# Patient Record
Sex: MALE | Race: WHITE | NOT HISPANIC OR LATINO | Employment: FULL TIME | URBAN - METROPOLITAN AREA
[De-identification: names, ages, dates, MRNs, and addresses within clinical notes are randomized per-mention and may not be internally consistent; named-entity substitution may affect disease eponyms.]

---

## 2023-07-26 ENCOUNTER — OFFICE VISIT (OUTPATIENT)
Dept: FAMILY MEDICINE CLINIC | Facility: CLINIC | Age: 53
End: 2023-07-26
Payer: COMMERCIAL

## 2023-07-26 VITALS
SYSTOLIC BLOOD PRESSURE: 134 MMHG | OXYGEN SATURATION: 96 % | HEIGHT: 71 IN | HEART RATE: 70 BPM | WEIGHT: 203.6 LBS | DIASTOLIC BLOOD PRESSURE: 96 MMHG | BODY MASS INDEX: 28.5 KG/M2 | RESPIRATION RATE: 18 BRPM

## 2023-07-26 DIAGNOSIS — Z12.11 SCREENING FOR COLON CANCER: ICD-10-CM

## 2023-07-26 DIAGNOSIS — F33.42 RECURRENT MAJOR DEPRESSIVE DISORDER, IN FULL REMISSION: Primary | ICD-10-CM

## 2023-07-26 DIAGNOSIS — F11.21: ICD-10-CM

## 2023-07-26 PROCEDURE — 1160F RVW MEDS BY RX/DR IN RCRD: CPT | Performed by: STUDENT IN AN ORGANIZED HEALTH CARE EDUCATION/TRAINING PROGRAM

## 2023-07-26 PROCEDURE — 99203 OFFICE O/P NEW LOW 30 MIN: CPT | Performed by: STUDENT IN AN ORGANIZED HEALTH CARE EDUCATION/TRAINING PROGRAM

## 2023-07-26 PROCEDURE — 1159F MED LIST DOCD IN RCRD: CPT | Performed by: STUDENT IN AN ORGANIZED HEALTH CARE EDUCATION/TRAINING PROGRAM

## 2023-07-26 RX ORDER — DILTIAZEM HYDROCHLORIDE 60 MG/1
2 TABLET, FILM COATED ORAL
COMMUNITY
Start: 2023-07-18 | End: 2023-07-26

## 2023-07-26 RX ORDER — FLUOXETINE HYDROCHLORIDE 20 MG/1
20 CAPSULE ORAL EVERY MORNING
Qty: 30 CAPSULE | Refills: 1 | Status: SHIPPED | OUTPATIENT
Start: 2023-07-26

## 2023-07-26 RX ORDER — TRAZODONE HYDROCHLORIDE 50 MG/1
50 TABLET ORAL
COMMUNITY
Start: 2023-07-12

## 2023-07-26 RX ORDER — METHOCARBAMOL 750 MG/1
1 TABLET, FILM COATED ORAL 3 TIMES DAILY
COMMUNITY
Start: 2023-06-14 | End: 2023-07-26

## 2023-07-26 RX ORDER — FLUOXETINE HYDROCHLORIDE 40 MG/1
40 CAPSULE ORAL EVERY MORNING
COMMUNITY
Start: 2023-06-14 | End: 2023-07-26 | Stop reason: SDUPTHER

## 2023-07-26 RX ORDER — HYDROXYZINE PAMOATE 50 MG/1
1 CAPSULE ORAL 3 TIMES DAILY
COMMUNITY
Start: 2023-06-13

## 2023-07-26 RX ORDER — MELOXICAM 15 MG/1
15 TABLET ORAL
COMMUNITY
Start: 2023-07-12

## 2023-07-26 RX ORDER — ALBUTEROL SULFATE 90 UG/1
2 AEROSOL, METERED RESPIRATORY (INHALATION) EVERY 4 HOURS PRN
COMMUNITY
Start: 2023-05-11 | End: 2023-07-26

## 2023-07-26 RX ORDER — OMEPRAZOLE 40 MG/1
40 CAPSULE, DELAYED RELEASE ORAL EVERY MORNING
COMMUNITY
Start: 2023-06-14 | End: 2023-08-04 | Stop reason: SDUPTHER

## 2023-07-26 NOTE — PROGRESS NOTES
"    Jens Angeles DO  [facility]  [address] [place] [address] [address]  [place] [address] [phone]    Subjective      Name Denny Hairston MRN [id number]    1970 AGE/SEX 53 y.o. / male      Chief Complaint Chief Complaint   Patient presents with    Establish Care     New patient here to establish care and discuss medications.         Visit History for  2023    History of Present Illness  Denny Hairston is a 53 y.o. male who presented today for Establish Care (New patient here to establish care and discuss medications.)    He has not had a doctor for many years.  No major concerns today.      Has not been diagnosed with lung disease in the past.      Feels like medication is doing well and he feels like he might even be alright to start to decrease prozac.  He feels like he is no where near as depressed and feels he has improved.  Lost his wife in her sleep about 3 years ago.      Currently on methadone.  Has been on fentanyl and heroin.  He has been on suboxone for the last 3 years.  Was having more cravings with the suboxone.  Has been a few months since he last used and has to go to [place] to clinic currently.  First started using at the age of 13.        Medications and Allergies   Current Outpatient Medications   Medication Instructions    FLUoxetine (PROZAC) 20 mg, Oral, Every Morning    hydrOXYzine pamoate (VISTARIL) 50 MG capsule 1 capsule, Oral, 3 Times Daily    meloxicam (MOBIC) 15 mg, Oral, Every Night at Bedtime    omeprazole (PRILOSEC) 40 mg, Oral, Every Morning    traZODone (DESYREL) 50 mg, Oral, Every Night at Bedtime     No Known Allergies   I have reviewed the above medications and allergies     Objective:      Vitals Vitals:    23 0818   BP: 134/96   BP Location: Left arm   Patient Position: Sitting   Cuff Size: Large Adult   Pulse: 70   Resp: 18   SpO2: 96%   Weight: 92.4 kg (203 lb 9.6 oz)   Height: 180.3 cm (71\")     Body mass index is 28.4 kg/mý.  "   Physical Exam  Vitals reviewed.   Constitutional:       General: He is not in acute distress.     Appearance: He is not ill-appearing.   Pulmonary:      Effort: Pulmonary effort is normal.   Psychiatric:         Mood and Affect: Mood normal.         Behavior: Behavior normal.         Thought Content: Thought content normal.         Judgment: Judgment normal.          Assessment/Plan      Issues Addressed/ Plan  1. Recurrent major depressive disorder, in full remission  -Going to decrease medications this time per patient desire to stop medication.  Currently on 40 mg.  Going to decrease to 20 mg for 1 month to see if he can tolerate without medication and remain in remission.  Afterwards he can decrease to 10 mg and stop medication after 2 weeks.  - FLUoxetine (PROzac) 20 MG capsule; Take 1 capsule by mouth Every Morning.  Dispense: 30 capsule; Refill: 1    2. Screening for colon cancer  - Cologuard - Stool, Per Rectum; Future    3. Heroin use disorder, severe, in early remission  -Patient is currently on methadone.  Seeing in clinic in [place].     There are no Patient Instructions on file for this visit.      Follow up  recommended Return in about 6 months (around 1/26/2024) for Annual physical.   - Dragon voice recognition software was utilized to complete this chart.  Every reasonable attempt was made to edit and correct the text, however some incorrect words may remain.   Jens Angeles, DO

## 2023-07-29 ENCOUNTER — HOSPITAL ENCOUNTER (EMERGENCY)
Facility: HOSPITAL | Age: 53
Discharge: HOME | End: 2023-07-29
Attending: EMERGENCY MEDICINE
Payer: COMMERCIAL

## 2023-07-29 VITALS
RESPIRATION RATE: 18 BRPM | HEIGHT: 71 IN | DIASTOLIC BLOOD PRESSURE: 84 MMHG | OXYGEN SATURATION: 96 % | BODY MASS INDEX: 28.7 KG/M2 | WEIGHT: 205 LBS | HEART RATE: 62 BPM | SYSTOLIC BLOOD PRESSURE: 138 MMHG | TEMPERATURE: 97.8 F

## 2023-07-29 DIAGNOSIS — F41.9 ANXIETY: Primary | ICD-10-CM

## 2023-07-29 PROCEDURE — 99283 EMERGENCY DEPT VISIT LOW MDM: CPT

## 2023-07-29 RX ORDER — HYDROXYZINE HYDROCHLORIDE 25 MG/1
25 TABLET, FILM COATED ORAL ONCE
Status: COMPLETED | OUTPATIENT
Start: 2023-07-29 | End: 2023-07-29

## 2023-07-29 RX ADMIN — HYDROXYZINE HYDROCHLORIDE 25 MG: 25 TABLET ORAL at 15:27

## 2023-07-29 NOTE — ED PROVIDER NOTES
Subjective   History of Present Illness  53-year-old male past medical history significant for depression GERD past substance abuse now on methadone who presents emergency room with concern for anxiety and panic attack.  Patient states that he has not been to sleep all week and has been having anxiety.  Patient has been prescribed Vistaril for such but states that he has not taken his Vistaril since yesterday morning.  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  He states that he woke up at 1 PM this afternoon I decided to come to the emergency room.  Patient describes no SI or HI.  Of note patient saw his primary care physician 3 days ago in the primary care physician's note at that time states the patient states has been doing better and they both agreed to decrease his Prozac in half from 40 mg to 20 mg.  There is no place in the note that indicates any worsening anxiety or difficulty sleeping.  Patient denies headache visual changes neck pain jaw pain sore throat cough shortness of breath chest pain anorexia nausea vomiting diarrhea or difficulty ambulating.    Review of Systems   Constitutional:  Negative for activity change, appetite change, chills, diaphoresis, fatigue and fever.   HENT:  Negative for congestion, sinus pressure, sneezing and sore throat.    Eyes:  Negative for photophobia and visual disturbance.   Respiratory:  Negative for cough and shortness of breath.    Cardiovascular:  Negative for chest pain, palpitations and leg swelling.   Gastrointestinal:  Negative for abdominal distention, abdominal pain, diarrhea, nausea and vomiting.   Genitourinary:  Negative for dysuria and flank pain.   Musculoskeletal:  Negative for arthralgias, back pain and myalgias.   Skin:  Negative for rash.   Neurological:  Negative for dizziness, weakness and headaches.   Psychiatric/Behavioral:  Positive for sleep disturbance. Negative for confusion, decreased  concentration, hallucinations, self-injury and suicidal ideas. The patient is nervous/anxious.      Past Medical History:   Diagnosis Date    Depression     GERD (gastroesophageal reflux disease)        No Known Allergies    History reviewed. No pertinent surgical history.    Family History   Problem Relation Age of Onset    Cancer Mother     Cancer Father     Cirrhosis Father     Liver disease Sister     Cancer Brother        Social History     Socioeconomic History    Marital status: Single   Tobacco Use    Smoking status: Every Day     Packs/day: 0.25     Types: Cigarettes    Smokeless tobacco: Never   Vaping Use    Vaping Use: Every day    Substances: Nicotine, Flavoring    Devices: Pre-filled pod   Substance and Sexual Activity    Alcohol use: Not Currently    Drug use: Yes     Comment: 1 month ago took street fentanyl    Sexual activity: Yes           Objective   Physical Exam  Constitutional:       General: He is not in acute distress.     Appearance: Normal appearance. He is not toxic-appearing or diaphoretic.      Comments: Anxious 53-year-old male no apparent distress   HENT:      Head: Normocephalic and atraumatic.      Mouth/Throat:      Mouth: Mucous membranes are moist.   Eyes:      Conjunctiva/sclera: Conjunctivae normal.   Cardiovascular:      Rate and Rhythm: Normal rate.      Pulses: Normal pulses.   Pulmonary:      Effort: Pulmonary effort is normal. No respiratory distress.      Breath sounds: No wheezing or rales.   Abdominal:      General: Abdomen is flat. There is no distension.      Tenderness: There is no abdominal tenderness.   Musculoskeletal:         General: No swelling or signs of injury. Normal range of motion.      Cervical back: Normal range of motion and neck supple.   Skin:     General: Skin is warm and dry.      Findings: No rash.   Neurological:      General: No focal deficit present.      Mental Status: He is alert.   Psychiatric:         Attention and Perception: Attention  normal. He is attentive.         Mood and Affect: Mood normal. Mood is anxious.         Speech: Speech normal.         Behavior: Behavior normal.         Thought Content: Thought content does not include suicidal ideation. Thought content does not include suicidal plan.       Procedures           ED Course  ED Course as of 07/29/23 1737   Sat Jul 29, 2023 1549 Discussed with patient the need to go back to his normal dose of 40 mg of Prozac and also did contact his primary care physician Dr. Angeles to inform him of this.  Also patient encouraged to take his Vistaril as directed which is 3 times daily as needed as he has not taken any in approximately 36 hours.  Patient states he understands agrees with plan of discharge home with follow-up with primary care and also can return emergency room for new persistent or worsening symptoms. []   1736 Patient is sleeping in room has gotten up to use the restroom several times.  Patient discharged home with primary care follow-up as needed and/or can return emergency room for new persistent or worsening symptoms. []      ED Course User Index  [] Bronson Hinds MD                                           Medical Decision Making  My differential diagnosis for anxiety and anxiety attacks includes but is not limited to anxiety attacks, anxiety about health, situational anxiety, stress reaction, hyperventilation, diabetic ketoacidosis, amphetamine abuse, methamphetamine abuse, cocaine abuse, hyperthyroidism, antihistamines and grief reaction.     Problems Addressed:  Anxiety: complicated acute illness or injury    Risk  Prescription drug management.        Final diagnoses:   Anxiety       ED Disposition  ED Disposition       ED Disposition   Discharge    Condition   Stable    Comment   --               Jens Angeles,   [address] [facility] [address] [phone]    Schedule an appointment as soon as possible for a visit       [facility]  EMERGENCY DEPARTMENT  [address]  [place] [address] [phone]  Go to   As needed         Medication List      No changes were made to your prescriptions during this visit.            Bronson Hinds MD  07/29/23 7087

## 2023-08-04 RX ORDER — OMEPRAZOLE 40 MG/1
40 CAPSULE, DELAYED RELEASE ORAL EVERY MORNING
Qty: 30 CAPSULE | Refills: 1 | Status: SHIPPED | OUTPATIENT
Start: 2023-08-04

## 2023-10-12 ENCOUNTER — OFFICE VISIT (OUTPATIENT)
Dept: FAMILY MEDICINE CLINIC | Facility: CLINIC | Age: 53
End: 2023-10-12
Payer: COMMERCIAL

## 2023-10-12 VITALS
DIASTOLIC BLOOD PRESSURE: 86 MMHG | HEART RATE: 83 BPM | WEIGHT: 194.4 LBS | OXYGEN SATURATION: 98 % | HEIGHT: 71 IN | SYSTOLIC BLOOD PRESSURE: 122 MMHG | BODY MASS INDEX: 27.22 KG/M2

## 2023-10-12 DIAGNOSIS — G47.9 DISORDERED SLEEP: ICD-10-CM

## 2023-10-12 DIAGNOSIS — K21.9 GASTROESOPHAGEAL REFLUX DISEASE, UNSPECIFIED WHETHER ESOPHAGITIS PRESENT: ICD-10-CM

## 2023-10-12 DIAGNOSIS — J45.31 MILD PERSISTENT ASTHMA WITH EXACERBATION: Primary | ICD-10-CM

## 2023-10-12 PROCEDURE — 1159F MED LIST DOCD IN RCRD: CPT | Performed by: STUDENT IN AN ORGANIZED HEALTH CARE EDUCATION/TRAINING PROGRAM

## 2023-10-12 PROCEDURE — 1160F RVW MEDS BY RX/DR IN RCRD: CPT | Performed by: STUDENT IN AN ORGANIZED HEALTH CARE EDUCATION/TRAINING PROGRAM

## 2023-10-12 PROCEDURE — 99213 OFFICE O/P EST LOW 20 MIN: CPT | Performed by: STUDENT IN AN ORGANIZED HEALTH CARE EDUCATION/TRAINING PROGRAM

## 2023-10-12 RX ORDER — ALBUTEROL SULFATE 90 UG/1
2 AEROSOL, METERED RESPIRATORY (INHALATION) EVERY 4 HOURS PRN
Qty: 18 G | Refills: 0 | Status: SHIPPED | OUTPATIENT
Start: 2023-10-12 | End: 2023-10-26

## 2023-10-12 RX ORDER — PREDNISONE 20 MG/1
TABLET ORAL
Qty: 19 TABLET | Refills: 0 | Status: SHIPPED | OUTPATIENT
Start: 2023-10-12 | End: 2023-10-22

## 2023-10-12 RX ORDER — AZITHROMYCIN 250 MG/1
TABLET, FILM COATED ORAL
Qty: 6 TABLET | Refills: 0 | Status: SHIPPED | OUTPATIENT
Start: 2023-10-12

## 2023-10-12 RX ORDER — OMEPRAZOLE 40 MG/1
40 CAPSULE, DELAYED RELEASE ORAL EVERY MORNING
Qty: 30 CAPSULE | Refills: 1 | Status: SHIPPED | OUTPATIENT
Start: 2023-10-12

## 2023-10-12 NOTE — PROGRESS NOTES
"Chief Complaint  Shortness of Breath (shortness of breath, sore throat and trouble sleeping, sleep walking. Would like to discuss sleep study for sleep apnea)    Subjective        Denny Hairston presents to [facility]  History of Present Illness  The patient is accompanied by an adult female.     The patient has had a cough for a couple of days. The adult female notes that the patient runs a fever about once a week. The patient is experiencing dyspnea, rhinitis and headache. Pharyngitis started today. According to the adult female, the patient fall right into a deep sleep but wakes up choking. He was never diagnosed with asthma, but another doctor has given him an albuterol inhaler in the past. The patient had a chest x-ray 2 weeks ago.    Approximately 1 year ago, the patient fell down some steps and hit his ribs, but he never had it checked out. He claims he could not cough or sneeze without experiencing severe pain.     Objective   Vital Signs:  /86 (BP Location: Right arm, Patient Position: Sitting, Cuff Size: Adult)   Pulse 83   Ht 180.3 cm (71\")   Wt 88.2 kg (194 lb 6.4 oz)   SpO2 98% Comment: oxygen level was at 88, had patient take deep breaths for a few minutes to get it up  BMI 27.11 kg/m²   Estimated body mass index is 27.11 kg/m² as calculated from the following:    Height as of this encounter: 180.3 cm (71\").    Weight as of this encounter: 88.2 kg (194 lb 6.4 oz).         Physical Exam  Vitals reviewed.   Constitutional:       General: He is not in acute distress.     Appearance: He is not ill-appearing.   Cardiovascular:      Rate and Rhythm: Normal rate and regular rhythm.   Pulmonary:      Effort: Pulmonary effort is normal.      Breath sounds: Wheezing present.   Neurological:      Mental Status: He is alert.   Psychiatric:         Mood and Affect: Mood normal.         Behavior: Behavior normal.         Thought Content: Thought content normal.         " Judgment: Judgment normal.        Result Review :                   Assessment and Plan   Diagnoses and all orders for this visit:    1. Mild persistent asthma with exacerbation (Primary)  -     predniSONE (DELTASONE) 20 MG tablet; Take 3 tablets by mouth Daily for 3 days, THEN 2 tablets Daily for 3 days, THEN 1 tablet Daily for 4 days.  Dispense: 19 tablet; Refill: 0  -     azithromycin (Zithromax Z-Evan) 250 MG tablet; Take 2 tablets by mouth on day 1, then 1 tablet daily on days 2-5  Dispense: 6 tablet; Refill: 0  -     albuterol sulfate  (90 Base) MCG/ACT inhaler; Inhale 2 puffs Every 4 (Four) Hours As Needed for Wheezing or Shortness of Air.  Dispense: 18 g; Refill: 0    2. Disordered sleep  -     Ambulatory Referral to Sleep Medicine    3. Gastroesophageal reflux disease, unspecified whether esophagitis present  -     omeprazole (priLOSEC) 40 MG capsule; Take 1 capsule by mouth Every Morning.  Dispense: 30 capsule; Refill: 1             Follow Up   No follow-ups on file.  Patient was given instructions and counseling regarding his condition or for health maintenance advice. Please see specific information pulled into the AVS if appropriate.     Transcribed from ambient dictation for Jens Angeles DO by Anamika Ontiveros.  10/12/23   18:09 EDT    Patient or patient representative verbalized consent to the visit recording.  I have personally performed the services described in this document as transcribed by the above individual, and it is both accurate and complete.

## 2023-10-26 DIAGNOSIS — J45.31 MILD PERSISTENT ASTHMA WITH EXACERBATION: ICD-10-CM

## 2023-10-26 RX ORDER — ALBUTEROL SULFATE 90 UG/1
2 AEROSOL, METERED RESPIRATORY (INHALATION) EVERY 4 HOURS PRN
Qty: 18 G | Refills: 0 | Status: SHIPPED | OUTPATIENT
Start: 2023-10-26

## 2023-12-05 ENCOUNTER — OFFICE VISIT (OUTPATIENT)
Dept: SLEEP MEDICINE | Facility: HOSPITAL | Age: 53
End: 2023-12-05
Payer: COMMERCIAL

## 2023-12-05 VITALS
SYSTOLIC BLOOD PRESSURE: 123 MMHG | HEIGHT: 70 IN | HEART RATE: 66 BPM | WEIGHT: 198 LBS | OXYGEN SATURATION: 96 % | BODY MASS INDEX: 28.35 KG/M2 | DIASTOLIC BLOOD PRESSURE: 84 MMHG

## 2023-12-05 DIAGNOSIS — F51.01 PRIMARY INSOMNIA: ICD-10-CM

## 2023-12-05 DIAGNOSIS — G47.33 OBSTRUCTIVE SLEEP APNEA: Primary | ICD-10-CM

## 2023-12-05 PROCEDURE — G0463 HOSPITAL OUTPT CLINIC VISIT: HCPCS

## 2023-12-05 NOTE — PROGRESS NOTES
SLEEP/PULMONARY  CLINIC NOTE      PATIENT IDENTIFICATION:  Name: Denny Hairston  Age: 53 y.o.  Sex: male  :  1970  MRN: [id number]    DATE OF CONSULTATION:  2023                     CHIEF COMPLAINT: RENE    History of Present Illness:   Denny Hairston is a 53 y.o. male Pt with still multiple wakening up at night with sleepiness fatigue and snoring, witnessed apnea, Hard  to get up in the morning. Daytime fatigue sleepiness loss of energy, Goodland score of ( 14)   Over more than 2 years pt started having trouble falling a sleep at night for 1-2 hours even dark quit bed room, tried different meds   over the over counter, also multiple wakening up at night every 2 hours 15-30...    Review of Systems:   Constitutional: negative   Eyes: negative   ENT/oropharynx: negative   Cardiovascular: negative   Respiratory: negative   Gastrointestinal: negative   Genitourinary: negative   Neurological: negative   Musculoskeletal: negative   Integument/breast: negative   Endocrine: negative   Allergic/Immunologic: negative     Past Medical History:  Past Medical History:   Diagnosis Date    Depression     GERD (gastroesophageal reflux disease)        Past Surgical History:  No past surgical history on file.     Family History:  Family History   Problem Relation Age of Onset    Cancer Mother     Cancer Father     Cirrhosis Father     Liver disease Sister     Cancer Brother         Social History:   Social History     Tobacco Use    Smoking status: Every Day     Packs/day: .25     Types: Cigarettes    Smokeless tobacco: Never   Substance Use Topics    Alcohol use: Not Currently        Allergies:  No Known Allergies    Home Meds:  (Not in a hospital admission)      Objective:    Vitals Ranges:   Heart Rate:  [66] 66  BP: (123)/(84) 123/84  Body mass index is 28.41 kg/m².     Exam:  General Appearance:  WDWN    HEENT:   without obvious abnormality,  Conjunctiva/corneas clear,  Normal external ear canals, no drainage    Clear  orsalmucosa,  Mallampati score 3    Neck:  Supple, symmetrical, trachea midline. No JVD.  Lungs:   Bilateral basal rhonchi bilaterally, respirations unlabored symmetrical wall movement.    Chest wall:  No tenderness or deformity.    Heart:  Regular rate and rhythm, S1 and S2 normal.  Extremities: Trace edema no clubbing or Cyanosis        Data Review:  All labs (24hrs): No results found for this or any previous visit (from the past 24 hour(s)).     Imaging:  No image results found.       ASSESSMENT:  Diagnoses and all orders for this visit:    Obstructive sleep apnea    Primary insomnia        PLAN:   This is patient with symptoms of obstructive sleep apnea, NPSG study ASAP / split night study, Avoid supine avoid sedative meds in pm, weight loss, Avoid driving. Long discussion with patient about the physiology of RENE, and long term and short term   benefit of treating RENE     Set a bedtime that is early enough for you to get at least 7 hours of sleep.  Don’t go to bed unless you are sleepy.  If you don’t fall asleep after 20 minutes, get out of bed.  Establish a relaxing bedtime routine.  Use your bed only for sleep and sex.  Make your bedroom quiet and relaxing. Keep the room at a comfortable, cool temperature.  Limit exposure to bright light in the evenings.  Turn off electronic devices at least 30 minutes before bedtime.  Don’t eat a large meal before bedtime. If you are hungry at night, eat a light, healthy snack.  Exercise regularly and maintain a healthy diet.  Avoid consuming caffeine in the late afternoon or evening.  Avoid consuming alcohol before bedtime.  Reduce your fluid intake before bedtime.  Avoid naps during the day time.     Follow-up after study    Nando Matos MD. D, ABSM.  12/5/2023  14:21 EST

## 2023-12-14 DIAGNOSIS — K21.9 GASTROESOPHAGEAL REFLUX DISEASE, UNSPECIFIED WHETHER ESOPHAGITIS PRESENT: ICD-10-CM

## 2023-12-14 RX ORDER — OMEPRAZOLE 40 MG/1
40 CAPSULE, DELAYED RELEASE ORAL EVERY MORNING
Qty: 30 CAPSULE | Refills: 1 | Status: SHIPPED | OUTPATIENT
Start: 2023-12-14

## 2023-12-21 ENCOUNTER — HOSPITAL ENCOUNTER (OUTPATIENT)
Dept: SLEEP MEDICINE | Facility: HOSPITAL | Age: 53
End: 2023-12-21
Payer: COMMERCIAL

## 2023-12-21 DIAGNOSIS — G47.33 OBSTRUCTIVE SLEEP APNEA: ICD-10-CM

## 2023-12-21 PROCEDURE — 95811 POLYSOM 6/>YRS CPAP 4/> PARM: CPT

## 2024-01-01 ENCOUNTER — HOSPITAL ENCOUNTER (EMERGENCY)
Facility: HOSPITAL | Age: 54
Discharge: HOME | End: 2024-01-01
Attending: EMERGENCY MEDICINE | Admitting: EMERGENCY MEDICINE
Payer: COMMERCIAL

## 2024-01-01 ENCOUNTER — APPOINTMENT (OUTPATIENT)
Dept: GENERAL RADIOLOGY | Facility: HOSPITAL | Age: 54
End: 2024-01-01
Payer: COMMERCIAL

## 2024-01-01 VITALS
OXYGEN SATURATION: 93 % | HEIGHT: 71 IN | SYSTOLIC BLOOD PRESSURE: 129 MMHG | TEMPERATURE: 99.5 F | RESPIRATION RATE: 20 BRPM | HEART RATE: 68 BPM | BODY MASS INDEX: 28 KG/M2 | WEIGHT: 200 LBS | DIASTOLIC BLOOD PRESSURE: 85 MMHG

## 2024-01-01 DIAGNOSIS — J06.9 UPPER RESPIRATORY TRACT INFECTION, UNSPECIFIED TYPE: Primary | ICD-10-CM

## 2024-01-01 DIAGNOSIS — R01.1 HEART MURMUR: ICD-10-CM

## 2024-01-01 LAB
ALBUMIN SERPL-MCNC: 4 G/DL (ref 3.5–5.2)
ALBUMIN/GLOB SERPL: 1.7 G/DL
ALP SERPL-CCNC: 120 U/L (ref 39–117)
ALT SERPL W P-5'-P-CCNC: 28 U/L (ref 1–41)
ANION GAP SERPL CALCULATED.3IONS-SCNC: 5.3 MMOL/L (ref 5–15)
AST SERPL-CCNC: 36 U/L (ref 1–40)
BASOPHILS # BLD AUTO: 0.04 10*3/MM3 (ref 0–0.2)
BASOPHILS NFR BLD AUTO: 0.3 % (ref 0–1.5)
BILIRUB SERPL-MCNC: 0.3 MG/DL (ref 0–1.2)
BUN SERPL-MCNC: 10 MG/DL (ref 6–20)
BUN/CREAT SERPL: 9.4 (ref 7–25)
CALCIUM SPEC-SCNC: 9.1 MG/DL (ref 8.6–10.5)
CHLORIDE SERPL-SCNC: 99 MMOL/L (ref 98–107)
CO2 SERPL-SCNC: 33.7 MMOL/L (ref 22–29)
CREAT SERPL-MCNC: 1.06 MG/DL (ref 0.76–1.27)
DEPRECATED RDW RBC AUTO: 43.6 FL (ref 37–54)
EGFRCR SERPLBLD CKD-EPI 2021: 83.4 ML/MIN/1.73
EOSINOPHIL # BLD AUTO: 0.06 10*3/MM3 (ref 0–0.4)
EOSINOPHIL NFR BLD AUTO: 0.5 % (ref 0.3–6.2)
ERYTHROCYTE [DISTWIDTH] IN BLOOD BY AUTOMATED COUNT: 14.9 % (ref 12.3–15.4)
FLUAV SUBTYP SPEC NAA+PROBE: NOT DETECTED
FLUBV RNA ISLT QL NAA+PROBE: NOT DETECTED
GLOBULIN UR ELPH-MCNC: 2.4 GM/DL
GLUCOSE SERPL-MCNC: 101 MG/DL (ref 65–99)
HCT VFR BLD AUTO: 36.3 % (ref 37.5–51)
HGB BLD-MCNC: 10.9 G/DL (ref 13–17.7)
IMM GRANULOCYTES # BLD AUTO: 0.09 10*3/MM3 (ref 0–0.05)
IMM GRANULOCYTES NFR BLD AUTO: 0.7 % (ref 0–0.5)
LYMPHOCYTES # BLD AUTO: 0.89 10*3/MM3 (ref 0.7–3.1)
LYMPHOCYTES NFR BLD AUTO: 7.1 % (ref 19.6–45.3)
MCH RBC QN AUTO: 24.2 PG (ref 26.6–33)
MCHC RBC AUTO-ENTMCNC: 30 G/DL (ref 31.5–35.7)
MCV RBC AUTO: 80.5 FL (ref 79–97)
MONOCYTES # BLD AUTO: 0.82 10*3/MM3 (ref 0.1–0.9)
MONOCYTES NFR BLD AUTO: 6.5 % (ref 5–12)
NEUTROPHILS NFR BLD AUTO: 10.7 10*3/MM3 (ref 1.7–7)
NEUTROPHILS NFR BLD AUTO: 84.9 % (ref 42.7–76)
NRBC BLD AUTO-RTO: 0 /100 WBC (ref 0–0.2)
NT-PROBNP SERPL-MCNC: 129.9 PG/ML (ref 0–900)
PLATELET # BLD AUTO: 195 10*3/MM3 (ref 140–450)
PMV BLD AUTO: 8.6 FL (ref 6–12)
POTASSIUM SERPL-SCNC: 4.4 MMOL/L (ref 3.5–5.2)
PROT SERPL-MCNC: 6.4 G/DL (ref 6–8.5)
QT INTERVAL: 407 MS
QTC INTERVAL: 455 MS
RBC # BLD AUTO: 4.51 10*6/MM3 (ref 4.14–5.8)
SARS-COV-2 RNA RESP QL NAA+PROBE: NOT DETECTED
SODIUM SERPL-SCNC: 138 MMOL/L (ref 136–145)
TROPONIN T SERPL HS-MCNC: <6 NG/L
WBC NRBC COR # BLD AUTO: 12.6 10*3/MM3 (ref 3.4–10.8)

## 2024-01-01 PROCEDURE — 99284 EMERGENCY DEPT VISIT MOD MDM: CPT

## 2024-01-01 PROCEDURE — 93005 ELECTROCARDIOGRAM TRACING: CPT | Performed by: EMERGENCY MEDICINE

## 2024-01-01 PROCEDURE — 84484 ASSAY OF TROPONIN QUANT: CPT | Performed by: EMERGENCY MEDICINE

## 2024-01-01 PROCEDURE — 63710000001 PREDNISONE PER 1 MG: Performed by: EMERGENCY MEDICINE

## 2024-01-01 PROCEDURE — 80053 COMPREHEN METABOLIC PANEL: CPT | Performed by: EMERGENCY MEDICINE

## 2024-01-01 PROCEDURE — 83880 ASSAY OF NATRIURETIC PEPTIDE: CPT | Performed by: EMERGENCY MEDICINE

## 2024-01-01 PROCEDURE — 71045 X-RAY EXAM CHEST 1 VIEW: CPT

## 2024-01-01 PROCEDURE — 85025 COMPLETE CBC W/AUTO DIFF WBC: CPT | Performed by: EMERGENCY MEDICINE

## 2024-01-01 PROCEDURE — 87636 SARSCOV2 & INF A&B AMP PRB: CPT | Performed by: EMERGENCY MEDICINE

## 2024-01-01 RX ORDER — METHYLPREDNISOLONE 4 MG/1
TABLET ORAL
Qty: 21 TABLET | Refills: 0 | Status: SHIPPED | OUTPATIENT
Start: 2024-01-01

## 2024-01-01 RX ORDER — BENZONATATE 100 MG/1
100 CAPSULE ORAL 3 TIMES DAILY PRN
Qty: 21 CAPSULE | Refills: 0 | Status: SHIPPED | OUTPATIENT
Start: 2024-01-01 | End: 2024-01-08

## 2024-01-01 RX ORDER — BENZONATATE 100 MG/1
100 CAPSULE ORAL ONCE
Status: DISCONTINUED | OUTPATIENT
Start: 2024-01-01 | End: 2024-01-01

## 2024-01-01 RX ORDER — SODIUM CHLORIDE 0.9 % (FLUSH) 0.9 %
10 SYRINGE (ML) INJECTION AS NEEDED
Status: DISCONTINUED | OUTPATIENT
Start: 2024-01-01 | End: 2024-01-01 | Stop reason: HOSPADM

## 2024-01-01 RX ORDER — METHADONE HYDROCHLORIDE 5 MG/1
10 TABLET ORAL DAILY
COMMUNITY

## 2024-01-01 RX ORDER — PREDNISONE 20 MG/1
40 TABLET ORAL ONCE
Status: COMPLETED | OUTPATIENT
Start: 2024-01-01 | End: 2024-01-01

## 2024-01-01 RX ADMIN — PREDNISONE 40 MG: 20 TABLET ORAL at 20:34

## 2024-01-02 NOTE — ED PROVIDER NOTES
Subjective   History of Present Illness  53-year-old male past medical history significant for GERD and Suboxone therapy secondary to drug abuse presents emergency room complaining of 1 years worth of sensation of shortness of breath and feel like he is going to pass out.  Patient states that he had a sleep study done approximately 2 weeks ago and does not know the results of yet but was told that he was going to need a CPAP but he has not gotten that yet.  Patient states that today when he was sitting in bed watching TV while awake he felt like he could not breathe and that he was going to pass out.  He did not pass out.  Patient states since that time he is felt anxious about not been able to breathe.  Wife states she thinks he feels a little bit warm as well.  Patient denies headache visual changes neck pain jaw pain sore throat diaphoresis wheezing cough chest pain anorexia nausea vomiting diarrhea.      Review of Systems   Constitutional:  Negative for activity change, appetite change, chills, diaphoresis, fatigue and fever.   HENT:  Negative for congestion, sinus pressure, sneezing and sore throat.    Eyes:  Negative for photophobia and visual disturbance.   Respiratory:  Positive for shortness of breath. Negative for cough.    Cardiovascular:  Negative for chest pain, palpitations and leg swelling.   Gastrointestinal:  Negative for abdominal distention, abdominal pain, diarrhea, nausea and vomiting.   Genitourinary:  Negative for dysuria and flank pain.   Musculoskeletal:  Negative for arthralgias, back pain and myalgias.   Skin:  Negative for rash.   Neurological:  Negative for weakness and headaches.   Psychiatric/Behavioral:  The patient is nervous/anxious.        Past Medical History:   Diagnosis Date    Depression     Drug abuse     GERD (gastroesophageal reflux disease)     RENE (obstructive sleep apnea)        No Known Allergies    History reviewed. No pertinent surgical history.    Family History    Problem Relation Age of Onset    Cancer Mother     Cancer Father     Cirrhosis Father     Liver disease Sister     Cancer Brother        Social History     Socioeconomic History    Marital status: Single   Tobacco Use    Smoking status: Every Day     Packs/day: .25     Types: Cigarettes    Smokeless tobacco: Never   Vaping Use    Vaping Use: Every day    Substances: Nicotine, Flavoring    Devices: Pre-filled pod   Substance and Sexual Activity    Alcohol use: Not Currently    Drug use: Yes     Comment: fentanyl, heroin - last use 07/2023    Sexual activity: Yes           Objective   Physical Exam  Vitals and nursing note reviewed.   Constitutional:       General: He is not in acute distress.     Appearance: Normal appearance. He is not toxic-appearing or diaphoretic.      Comments: Patient is 53-year-old male in no acute distress   HENT:      Head: Normocephalic and atraumatic.      Mouth/Throat:      Mouth: Mucous membranes are moist.   Eyes:      Conjunctiva/sclera: Conjunctivae normal.   Cardiovascular:      Rate and Rhythm: Normal rate and regular rhythm.      Pulses: Normal pulses.      Heart sounds: Murmur heard.      Systolic murmur is present with a grade of 2/6.   Pulmonary:      Effort: Pulmonary effort is normal. No respiratory distress.      Breath sounds: Normal breath sounds. No decreased breath sounds, wheezing, rhonchi or rales.   Abdominal:      General: Abdomen is flat. There is no distension.      Tenderness: There is no abdominal tenderness.   Musculoskeletal:         General: No swelling or signs of injury. Normal range of motion.      Cervical back: Normal range of motion and neck supple.   Skin:     General: Skin is warm and dry.      Findings: No rash.   Neurological:      General: No focal deficit present.      Mental Status: He is alert and oriented to person, place, and time.   Psychiatric:         Mood and Affect: Mood normal.         Behavior: Behavior normal.         Procedures            ED Course  ED Course as of 01/01/24 2027 Mon Jan 01, 2024 1941 EKG time 1930  Normal sinus rhythm  Heart rate 75  Axes are normal  Intervals are normal  No acute ST abnormalities noted []   2024 Chest x-ray:     IMPRESSION:     1. Low-volume image, otherwise negative frontal chest.        Signer Name: Darius Davsi MD   Signed: 1/1/2024 8:09 PM EST  [facility]   []   2024 HS Troponin T: <6 []   2024 proBNP: 129.9 []   2024 WBC(!): 12.60 []   2024 Hemoglobin(!): 10.9 []   2024 Hematocrit(!): 36.3 []   2024 Platelets: 195 []   2024 Glucose(!): 101 []   2024 BUN: 10 []   2024 Creatinine: 1.06 []   2024 COVID19: Not Detected []   2024 Influenza A PCR: Not Detected []   2024 Influenza B PCR: Not Detected []   2026 Patient's workup is essentially negative with exception of her his white blood cell count is mildly elevated at 12.6.  He is negative for COVID and flu.  His chest x-ray looks essentially normal but given patient's symptoms I will treat him for a pressure infection with steroids cough medicine and follow-up with primary care as needed.  Patient can also return the emergency room for new persistent or worsening symptoms. []   2026 I discussed with patient that her day was mild his systolic murmur and he states that he is never been told that before.  Suggested to him that he needs to follow-up with his primary care physician who may have him outpatient Coni do an echocardiogram or referral to cardiology for further workup.  Patient states he understands and agrees with plan []      ED Course User Index  [] Bronson Hinds MD                                             Medical Decision Making  My differential diagnosis for dyspnea includes but is not limited to:  Asthma, COPD, pneumonia, pulmonary embolus, acute respiratory distress syndrome, pneumothorax, pleural effusion, pulmonary fibrosis, congestive heart failure, myocardial  infarction, DKA, uremia, acidosis, sepsis, anemia, drug related, hyperventilation, CNS disease     Amount and/or Complexity of Data Reviewed  Labs: ordered. Decision-making details documented in ED Course.  Radiology: ordered. Decision-making details documented in ED Course.  ECG/medicine tests: ordered. Decision-making details documented in ED Course.    Risk  Prescription drug management.        Final diagnoses:   Upper respiratory tract infection, unspecified type   Heart murmur       ED Disposition  ED Disposition       ED Disposition   Discharge    Condition   Stable    Comment   --               Jens Angeles DO  [address] [facility] [address] [phone]    Schedule an appointment as soon as possible for a visit       [facility] EMERGENCY DEPARTMENT  [address] [facility] [place] [address] [phone]  Go to   As needed         Medication List        New Prescriptions      benzonatate 100 MG capsule  Commonly known as: TESSALON  Take 1 capsule by mouth 3 (Three) Times a Day As Needed for Cough for up to 7 days.     methylPREDNISolone 4 MG dose pack  Commonly known as: MEDROL  Take as directed on package instructions.               Where to Get Your Medications        These medications were sent to [id number] - [place] [address] AT [facility] & [address] - [phone]  - [phone] [address] [address]      Phone: [phone]   benzonatate 100 MG capsule  methylPREDNISolone 4 MG dose pack            Bronson Hinds MD  01/01/24 2027

## 2024-01-16 ENCOUNTER — OFFICE VISIT (OUTPATIENT)
Dept: FAMILY MEDICINE CLINIC | Facility: CLINIC | Age: 54
End: 2024-01-16
Payer: COMMERCIAL

## 2024-01-16 VITALS
OXYGEN SATURATION: 96 % | SYSTOLIC BLOOD PRESSURE: 132 MMHG | HEIGHT: 71 IN | HEART RATE: 73 BPM | WEIGHT: 211 LBS | BODY MASS INDEX: 29.54 KG/M2 | DIASTOLIC BLOOD PRESSURE: 88 MMHG | RESPIRATION RATE: 20 BRPM

## 2024-01-16 DIAGNOSIS — G25.81 RESTLESS LEG: Primary | ICD-10-CM

## 2024-01-16 DIAGNOSIS — R07.81 RIB PAIN: ICD-10-CM

## 2024-01-16 DIAGNOSIS — F41.9 SEVERE ANXIETY: ICD-10-CM

## 2024-01-16 RX ORDER — ROPINIROLE 0.25 MG/1
0.25 TABLET, FILM COATED ORAL NIGHTLY
Qty: 30 TABLET | Refills: 2 | Status: SHIPPED | OUTPATIENT
Start: 2024-01-16

## 2024-01-16 RX ORDER — DULOXETIN HYDROCHLORIDE 60 MG/1
60 CAPSULE, DELAYED RELEASE ORAL DAILY
Qty: 30 CAPSULE | Refills: 2 | Status: SHIPPED | OUTPATIENT
Start: 2024-01-16

## 2024-01-16 NOTE — PROGRESS NOTES
Jens Angeles,   [facility]  [address]  [place] [id number] [phone]    Subjective      Name Denny Hairston MRN [id number]    1970 AGE/SEX 53 y.o. / male      Chief Complaint Chief Complaint   Patient presents with    Fall     Fell last night and today, having rib pain on left side. And knee pain     Hospital Follow Up Visit     Was seen in ED and DX with Upper respiratory infection on          Visit History for  2024    History of Present Illness  Denny Hairston is a 53 y.o. male who presented today for Fall (Fell last night and today, having rib pain on left side. And knee pain ) and Hospital Follow Up Visit (Was seen in ED and DX with Upper respiratory infection on )    Recently seen in the emergency room for dyspnea and was diagnosed with a heart murmur. Wife reports he has insomnia and when he is asleep, he jerks frequently. On average, he gets a couple of hours of sleep at a time. Two falls have occurred within the last 24 hours due to getting up and falling back asleep due to being so tired. While falling asleep he feels like he loses his breath. Sleep study performed on 2023 and told he needs a CPAP.  Wife bought him a 12-inch wedge for his bed. During one of his falls, he hurt his ribs on the left and has had them wrapped with an ACE bandage. He has fallen in the kitchen from standing twice. Denies bruising.    In the past he has been on Valium, Prozac, and trazadone for anxiety. Previously has tried BuSpar but made him ill.         Medications and Allergies   Current Outpatient Medications   Medication Instructions    DULoxetine (CYMBALTA) 60 mg, Oral, Daily    methadone (DOLOPHINE) 10 mg, Oral, Daily    methylPREDNISolone (MEDROL) 4 MG dose pack Take as directed on package instructions.    omeprazole (PRILOSEC) 40 mg, Oral, Every Morning    rOPINIRole (REQUIP) 0.25 mg, Oral, Nightly, Take 1 hour before bedtime.    Ventolin   "(90 Base) MCG/ACT inhaler 2 puffs, Inhalation, Every 4 Hours PRN     No Known Allergies   I have reviewed the above medications and allergies     Objective:      Vitals Vitals:    01/16/24 1348   BP: 132/88   BP Location: Left arm   Patient Position: Sitting   Cuff Size: Adult   Pulse: 73   Resp: 20   SpO2: 96%   Weight: 95.7 kg (211 lb)   Height: 180.3 cm (71\")     Body mass index is 29.43 kg/m².    Physical Exam  Musculoskeletal:      Comments: Tenderness to palpation to ribs 7 and 8 on the left side.          Sleep study results were reviewed with the patient.    Assessment/Plan      Issues Addressed/ Plan   Diagnosis Plan   1. Restless leg  rOPINIRole (REQUIP) 0.25 MG tablet      2. Severe anxiety  DULoxetine (Cymbalta) 60 MG capsule      3. Rib pain           1. Restless leg syndrome.  I will start him on a low dose of Requip.    2. Sleep apnea.  He had a sleep study done on 12/21/2023. He will call and schedule a follow-up appointment with the sleep specialist.    3. Anxiety.  His anxiety will get better once he is sleeping. I will start him on Cymbalta.    4. Left sided rib pain.  His ribs do not feel displaced. He was advised to practice deep breathing at least 3 to 4 times a day. He was advised to ice and rest. If suddenly, he feels extremely short of breath and he cannot breathe, he was advised to go to the hospital.        Follow up  recommended Return in about 1 month (around 2/16/2024).   - Dragon voice recognition software was utilized to complete this chart.  Every reasonable attempt was made to edit and correct the text, however some incorrect words may remain.   Jens Angeles DO    Transcribed from ambient dictation for Jens Angeles DO by Brigitte Murdock.  01/16/24   15:18 EST    Patient or patient representative verbalized consent to the visit recording.  I have personally performed the services described in this document as transcribed by the above individual, and it is both accurate and " complete.

## 2024-03-04 ENCOUNTER — OFFICE VISIT (OUTPATIENT)
Dept: FAMILY MEDICINE CLINIC | Facility: CLINIC | Age: 54
End: 2024-03-04
Payer: COMMERCIAL

## 2024-03-04 VITALS
DIASTOLIC BLOOD PRESSURE: 82 MMHG | BODY MASS INDEX: 29.54 KG/M2 | HEIGHT: 71 IN | HEART RATE: 70 BPM | RESPIRATION RATE: 20 BRPM | SYSTOLIC BLOOD PRESSURE: 120 MMHG | OXYGEN SATURATION: 95 % | WEIGHT: 211 LBS

## 2024-03-04 DIAGNOSIS — Z00.01 ENCOUNTER FOR WELL ADULT EXAM WITH ABNORMAL FINDINGS: Primary | ICD-10-CM

## 2024-03-04 DIAGNOSIS — H61.21 IMPACTED CERUMEN OF RIGHT EAR: ICD-10-CM

## 2024-03-04 DIAGNOSIS — Z12.5 SCREENING FOR PROSTATE CANCER: ICD-10-CM

## 2024-03-04 DIAGNOSIS — E78.2 MIXED HYPERLIPIDEMIA: ICD-10-CM

## 2024-03-04 DIAGNOSIS — R73.9 HYPERGLYCEMIA: ICD-10-CM

## 2024-03-04 DIAGNOSIS — R19.5 POSITIVE COLORECTAL CANCER SCREENING USING COLOGUARD TEST: ICD-10-CM

## 2024-03-04 PROCEDURE — 99396 PREV VISIT EST AGE 40-64: CPT | Performed by: STUDENT IN AN ORGANIZED HEALTH CARE EDUCATION/TRAINING PROGRAM

## 2024-03-04 PROCEDURE — 1160F RVW MEDS BY RX/DR IN RCRD: CPT | Performed by: STUDENT IN AN ORGANIZED HEALTH CARE EDUCATION/TRAINING PROGRAM

## 2024-03-04 PROCEDURE — 1159F MED LIST DOCD IN RCRD: CPT | Performed by: STUDENT IN AN ORGANIZED HEALTH CARE EDUCATION/TRAINING PROGRAM

## 2024-03-04 NOTE — PROGRESS NOTES
Jens Angeles DO  [facility]  [facility]  [place] [address] [phone]    Subjective      Name Denny Hairston MRN [id number]    1970 AGE/SEX 54 y.o. / male      Chief Complaint Chief Complaint   Patient presents with    Annual Exam     Here for annual exam         Visit History for  2024    History of Present Illness  Denny Hairston 54 y.o. male who presents for an Annual Wellness Visit. He has a history of   Patient Active Problem List   Diagnosis    Recurrent major depressive disorder, in full remission    Heroin use disorder, severe, in early remission    Positive colorectal cancer screening using Cologuard test    Obstructive sleep apnea   .   Underwent sleep study since last visit. Scheduled for follow up with sleep specialist on 2024 for results review. Regularly wears CPAP machine. Sleep clinician ordered laboratory testing that was completed in 2024.     Recently at [facility], experienced an episode of dyspnea when he bent down to tie his shoe. Caused a panic attack. Had to kneel down to catch breath. Felt as though he might pass out. Has gained weight recently.       Denies prior issues with ear cerumen. Denies experiencing issues with right ear.        Current Life Goals: Would like to change his diet and trying to get health more under control     Patient Care Team:  Jens Angeles DO as PCP - General (Family Medicine)      Health Habits     Diet & Exercise:       Diet:     Lot of sugar.     [x] Generally Unhealthy   [] Gluten Free  [] Vegan       Exercise:     Type: none  Frequency: 0 times/week.       Tobacco Use:     Social History     Tobacco Use   Smoking Status Every Day    Current packs/day: 0.25    Types: Cigarettes    Passive exposure: Current   Smokeless Tobacco Never        Denny Hairston  reports that he has been smoking cigarettes. He has been exposed to tobacco smoke. He has never used smokeless tobacco..               "    Alcohol Use:     Social History     Substance and Sexual Activity   Alcohol Use Not Currently         Counseling Given: [] Yes  [x] No       Dental Exam:   [] Up to date  [] Scheduled  [x] Needed   Last Exam: Needed and has been for many years.     Eye Exam:   [] Up to date  [] Scheduled  [x] Needed   Last Exam: May need in the future, but currently manageable      Screenings:     PHQ-9 Depression Screening  Little interest or pleasure in doing things? 0-->not at all   Feeling down, depressed, or hopeless? 0-->not at all   Trouble falling or staying asleep, or sleeping too much?     Feeling tired or having little energy?     Poor appetite or overeating?     Feeling bad about yourself - or that you are a failure or have let yourself or your family down?     Trouble concentrating on things, such as reading the newspaper or watching television?     Moving or speaking so slowly that other people could have noticed? Or the opposite - being so fidgety or restless that you have been moving around a lot more than usual?     Thoughts that you would be better off dead, or of hurting yourself in some way?     PHQ-9 Total Score 0   If you checked off any problems, how difficult have these problems made it for you to do your work, take care of things at home, or get along with other people?        Hepatitis C Screening:   No results found for: \"HEPCVIRUSABY\"    Lung Cancer Screening: Qualifies? [] Yes  [] No   Completed:    Colon Cancer Screening:   Last Completed Colonoscopy            Ordered - COLORECTAL CANCER SCREENING (COLOGUARD - Every 3 Years) Ordered on 3/4/2024      10/31/2023  Cologuard component of Cologuard - Stool, Per Rectum                     Prostate Cancer Screening:   PSA   Date Value Ref Range Status   03/04/2024 0.244 0.000 - 4.000 ng/mL Final     Comment:     Testing Method: Roche [employer] Electrochemiluminescence  Immunoassay(ECLIA)  Values obtained with different assay methods or kits cannot  be " "used interchangeably.            Advance Care Planning  Patient does not have an advance directive, information provided.    Review of Systems   Eyes:  Positive for visual disturbance.   Respiratory:  Positive for shortness of breath.    Genitourinary:  Positive for frequency.   Psychiatric/Behavioral:  The patient is nervous/anxious.        The following portions of the patient's history were reviewed and updated as appropriate: allergies, current medications, past family history, past medical history, past social history, past surgical history and problem list.     Past Medical, Family, Social History     Medical History: has a past medical history of Depression, Drug abuse, GERD (gastroesophageal reflux disease), and RENE (obstructive sleep apnea).   Surgical History: has no past surgical history on file.   Family History: family history includes Cancer in his brother, father, and mother; Cirrhosis in his father; Liver disease in his sister.   Social History: reports that he has been smoking cigarettes. He has been exposed to tobacco smoke. He has never used smokeless tobacco. He reports that he does not currently use alcohol. He reports current drug use.       Medications and Allergies   Current Outpatient Medications   Medication Instructions    DULoxetine (CYMBALTA) 60 mg, Oral, Daily    methadone (DOLOPHINE) 10 mg, Oral, Daily    omeprazole (PRILOSEC) 40 mg, Oral, Every Morning    rOPINIRole (REQUIP) 0.25 mg, Oral, Nightly, Take 1 hour before bedtime.    Ventolin  (90 Base) MCG/ACT inhaler 2 puffs, Inhalation, Every 4 Hours PRN     No Known Allergies       Objective:      Vitals Vitals:    03/04/24 1027   BP: 120/82   BP Location: Left arm   Patient Position: Sitting   Cuff Size: Large Adult   Pulse: 70   Resp: 20   SpO2: 95%   Weight: 95.7 kg (211 lb)   Height: 180.3 cm (71\")     Body mass index is 29.43 kg/m².    Physical Exam  Vitals reviewed.   Constitutional:       General: He is not in acute " distress.     Appearance: He is not ill-appearing.   HENT:      Right Ear: There is impacted cerumen.   Cardiovascular:      Rate and Rhythm: Normal rate and regular rhythm.   Pulmonary:      Effort: Pulmonary effort is normal.      Breath sounds: Normal breath sounds.   Neurological:      Mental Status: He is alert.   Psychiatric:         Mood and Affect: Mood normal.         Behavior: Behavior normal.         Thought Content: Thought content normal.         Judgment: Judgment normal.            Assessment/Plan      Issues Addressed/ Plan   Diagnosis Plan   1. Encounter for well adult exam with abnormal findings        2. Impacted cerumen of right ear        3. Positive colorectal cancer screening using Cologuard test  Ambulatory Referral For Screening Colonoscopy      4. Mixed hyperlipidemia  Lipid panel      5. Hyperglycemia  Hemoglobin A1c      6. Screening for prostate cancer  PSA SCREENING        1. Positive colorectal cancer screening with Cologuard.  - I reviewed test results with the patient today. I explained that the positive test may not necessarily mean that he has cancer, it means that there is something that needs further evaluation. I advised him to schedule a colonoscopy within the year. I will place a referral for colonoscopy for him to schedule.     2. Mixed hyperlipidemia.  - I have placed an order for lipid panel to be completed today.     3. Hyperglycemia.  - I have placed an order for hemoglobin A1c to be completed today.     4. Screening for prostate cancer.   - Due to admitted urinary frequency, I have placed an order for PSA laboratory test to be completed today.     5. Cerumen impaction of right ear.  - Cerumen impaction was extremely hard and difficult to remove. Attempted removal manually and with use of warm water and peroxide.  He can return to have rest of impaction removed, or it may fall out on its own as long as he is using eardrops.    6. Recent weight gain.  - I recommended that  he initiate exercise with walking at pace for 30 minutes 3 to 4 times per week. Advised that if he begins to experience dyspnea while walking, that he slow his pace. Recommended he identify a few things in his diet that are unhealthy that he can eliminate. Also recommended that he eliminate night time eating.       Discussion:    Wears seat belt? [x] Yes  [] No     Wears sunscreen? [x] Yes  [] No      BMI: Body mass index is 29.43 kg/m².    BMI is >= 25 and <30. (Overweight) The following options were offered after discussion;: exercise counseling/recommendations and nutrition counseling/recommendations        There are no Patient Instructions on file for this visit.      Follow up  recommended Return in about 3 months (around 6/4/2024).     Jens Angeles DO    Transcribed from ambient dictation for Jens Angeles DO by Shannon Miller.  03/04/24   12:09 EST    Patient or patient representative verbalized consent to the visit recording.  I have personally performed the services described in this document as transcribed by the above individual, and it is both accurate and complete.

## 2024-03-05 LAB
CHOLEST SERPL-MCNC: 179 MG/DL (ref 0–200)
HBA1C MFR BLD: 6.3 % (ref 4.8–5.6)
HDLC SERPL-MCNC: 48 MG/DL (ref 40–60)
LDLC SERPL CALC-MCNC: 105 MG/DL (ref 0–100)
PSA SERPL-MCNC: 0.24 NG/ML (ref 0–4)
TRIGL SERPL-MCNC: 146 MG/DL (ref 0–150)
VLDLC SERPL CALC-MCNC: 26 MG/DL (ref 5–40)

## 2024-03-06 ENCOUNTER — TELEPHONE (OUTPATIENT)
Dept: GASTROENTEROLOGY | Facility: CLINIC | Age: 54
End: 2024-03-06
Payer: COMMERCIAL

## 2024-03-15 DIAGNOSIS — R19.5 POSITIVE COLORECTAL CANCER SCREENING USING COLOGUARD TEST: Primary | ICD-10-CM

## 2024-03-19 ENCOUNTER — OFFICE VISIT (OUTPATIENT)
Dept: SLEEP MEDICINE | Facility: HOSPITAL | Age: 54
End: 2024-03-19
Payer: COMMERCIAL

## 2024-03-19 VITALS
WEIGHT: 212 LBS | DIASTOLIC BLOOD PRESSURE: 81 MMHG | BODY MASS INDEX: 30.35 KG/M2 | HEIGHT: 70 IN | OXYGEN SATURATION: 93 % | HEART RATE: 68 BPM | SYSTOLIC BLOOD PRESSURE: 122 MMHG

## 2024-03-19 DIAGNOSIS — G47.33 OBSTRUCTIVE SLEEP APNEA: Primary | ICD-10-CM

## 2024-03-19 DIAGNOSIS — F33.42 RECURRENT MAJOR DEPRESSIVE DISORDER, IN FULL REMISSION: ICD-10-CM

## 2024-03-19 PROCEDURE — G0463 HOSPITAL OUTPT CLINIC VISIT: HCPCS

## 2024-03-19 NOTE — PROGRESS NOTES
SLEEP/PULMONARY  CLINIC NOTE      PATIENT IDENTIFICATION:  Name: Denny Hairston  Age: 54 y.o.  Sex: male  :  1970  MRN: [id number]    DATE OF CONSULTATION:  3/19/2024                     CHIEF COMPLAINT: RENE    History of Present Illness:   Denny Hairston is a 54 y.o. male Pt on CPAP feeling better more energy especially the night he use it more than 4 hours, no sleepiness no fatigue no tiredness, no mask irritation no dryness, compliance report reviewed with pt d discussed with the patient the download data and encouarged the patient to continue to use the CPAP. The residual AHI is acceptable but patient usage is is not up to the basic, discussed with him in details most likely related to his anxiety disorder, and to the chronic mask.         Review of Systems:   Constitutional: As above   Eyes: negative   ENT/oropharynx: negative   Cardiovascular: negative   Respiratory: negative   Gastrointestinal: negative   Genitourinary: negative   Neurological: negative   Musculoskeletal: negative   Integument/breast: negative   Endocrine: negative   Allergic/Immunologic: negative     Past Medical History:  Past Medical History:   Diagnosis Date    Depression     Drug abuse     GERD (gastroesophageal reflux disease)     RENE (obstructive sleep apnea)        Past Surgical History:  No past surgical history on file.     Family History:  Family History   Problem Relation Age of Onset    Cancer Mother     Cancer Father     Cirrhosis Father     Liver disease Sister     Cancer Brother         Social History:   Social History     Tobacco Use    Smoking status: Every Day     Current packs/day: 0.25     Types: Cigarettes     Passive exposure: Current    Smokeless tobacco: Never   Substance Use Topics    Alcohol use: Not Currently        Allergies:  No Known Allergies    Home Meds:  (Not in a hospital admission)      Objective:    Vitals Ranges:   Heart Rate:  [68] 68  BP: (122)/(81) 122/81  Body mass index is 30.42 kg/m².  "    Exam:  General Appearance:  WDWN    HEENT:   without obvious abnormality,  Conjunctiva/corneas clear,  Normal external ear canals, no drainage    Clear orsalmucosa,  Mallampati score 3    Neck:  Supple, symmetrical, trachea midline. No JVD.  Lungs:   Bilateral basal rhonchi bilaterally, respirations unlabored symmetrical wall movement.    Chest wall:  No tenderness or deformity.    Heart:  Regular rate and rhythm, S1 and S2 normal.  Extremities: Trace edema no clubbing or Cyanosis        Data Review:  All labs (24hrs): No results found for this or any previous visit (from the past 24 hour(s)).     Imaging:  Polysomnography 4 or More Parameters                                                                                                                               Polysomnography  PATIENT IDENTIFICATION:  Name: Denny Hairston  Age: 53 y.o.  Sex: male  :  1970  MRN: [id number]    DATE OF Study: 2023    Weight: 89 kg Lb  Height : 177 cm\"    BMI: 28    Reason For study:   Patient with daytime sleepiness  snoring at night , tiredness fatigue   feeling poor quality of sleep  recommendation to proceed  with   polysomnography.    Technical description:   this is an overnight polysomnography study using standardized parameters   including EEG, EOG, EKG, anterior tibialis and chin EMG, blood oxygen   saturation, oral nasal airflow, respiratory area for monitoring with both   abdominal and thoracic gauges and snore monitoring using a standard   titration protocol.     Summary of sleep parameters:  patient went to bed at(9: 41) p.m, woke up with lightt on at(11:41) a.m   total study time(119) minutes, total sleep time(110) minutes, latency to   sleep onset(2.5) minutes, latency to REM sleep (31) minutes, sleep   efficiency was  (91) %, sleep stages where(27)% REM sleep , the rest is   non-REM .     Summary of respiratory efforts:  Patient had  a total of (75) apnea hypopnea with  AHI(40.9) times per " "  hour. longest apnea (30) seconds,  total RDI was (40.9) times per hour,   Lowest desaturation was to (87% s), .     Summary over the heart rate:  Heart rate was fluctuating between (40) and   (90) bpm.     Summary of periodic leg movement:  Patient had a total of (0) periodic leg movement with arousal and index   (0) times per hour.      Impression:  this is a polysomnography study  showing the patient has obstructive sleep   apnea.        Recommendation:   Patient need to follow up with a sleep specialist for further   recommendation and management, avoid supine sleeping, and sedative   medication, avoid long-distance driving till treated and all symptoms   resolved.    Nando Matos MD. D, ABSM.  2024  12:58 EST     Positive airway pressure titration STUDY    PATIENT IDENTIFICATION:  Name: Denny Hairston  Age: 53 y.o.  Sex: male  :  1970  MRN: [id number]    DATE OF Study: 2023   Referring Physician: [unfilled]    WEIGHT: 89 EKG   LB       HEIGHT: 177 cm\"       BMI: 28.4    Reason For study:   Patient with daytime sleepiness  snoring at night sleep study showed   obstructive sleep apnea recommendation to proceed was titration study    Technical description:   this is an overnight polysomnography study using standardized parameters   including EEG, EOG, EKG, anterior tibialis and chin EMG, blood oxygen   saturation, oral nasal airflow, respiratory area for monitoring with both   abdominal and thoracic gauges and snore monitoring using a standard   titration protocol.     Summary of sleep parameters:  patient went to bed at(11: 41) p.m, woke up with lightt on at(6: 41) a.m   total study time(419) minutes, total sleep time(366) minutes, latency to   sleep onset(8.8) minutes, latency to REM sleep (56) minutes, sleep   efficiency was  (87) %, sleep stages where(28)% REM sleep , the rest is   non-REM .    Summary of respiratory efforts:  Patient went to sleep starting with CPAP pressure 5 cm H2O, " pressure was   titrated to (BiPAP pressure of 26/20) centimeter H2O at this pressure   patient patient slept  (10) minutes did not have any apnea hypopnea or   snoring or desaturation.     Summary over the heart rate:  Heart rate was fluctuating between (40) and  (100) bpm.     Summary of periodic leg movement:  Patient had a total of (7) periodic leg movement with arousal and index   (1.1) times per hour.      Impression:  This is a PAP titration study  showing the patient has obstructive sleep   apnea requiring the pressure of (BiPAP 26/20) centimeters H2O to overcome   most apnea hypopnea in the snoring.     Recommendation:  Start the patient on positive airway pressure of (auto BiPAP 24/14   pressure support of 6) centimeters H2O follow up with a sleep specialist   for further recommendation and follow-up on compliance, meanwhile avoid   supine sleeping,  sedative medication, avoid long-distance driving till   all symptoms resolved.    Nando Matos MD. D, ABSM.  1/9/2024  13:00 EST        ASSESSMENT:  Diagnoses and all orders for this visit:    Obstructive sleep apnea    Recurrent major depressive disorder, in full remission        PLAN:  Plan to change mask to new fitting    This patient with obstructive sleep apnea, compliance is improved. Encourage to use it more frequent, I re-emphasized on pt the long and short term benefit of treating RENE. The device is benefiting the patient.  The patient is also instructed to get the CPAP supplies from the [facility] and see me in 1 year for follow-up.    I recommend to follow-up with the primary care/psychiatrist for managing his anxiety disorder and depression to assist with the compliance    Follow-up 6 weeks    MD. JORDIN Eastman, ABSM.  3/19/2024  14:48 EDT

## 2024-03-29 ENCOUNTER — ANESTHESIA EVENT (OUTPATIENT)
Dept: PERIOP | Facility: HOSPITAL | Age: 54
End: 2024-03-29

## 2024-04-03 ENCOUNTER — TELEPHONE (OUTPATIENT)
Dept: FAMILY MEDICINE CLINIC | Facility: CLINIC | Age: 54
End: 2024-04-03
Payer: COMMERCIAL

## 2024-04-03 ENCOUNTER — ANESTHESIA (OUTPATIENT)
Dept: PERIOP | Facility: HOSPITAL | Age: 54
End: 2024-04-03

## 2024-04-03 ENCOUNTER — HOSPITAL ENCOUNTER (OUTPATIENT)
Facility: HOSPITAL | Age: 54
Setting detail: HOSPITAL OUTPATIENT SURGERY
Discharge: HOME | End: 2024-04-03
Attending: STUDENT IN AN ORGANIZED HEALTH CARE EDUCATION/TRAINING PROGRAM | Admitting: STUDENT IN AN ORGANIZED HEALTH CARE EDUCATION/TRAINING PROGRAM
Payer: COMMERCIAL

## 2024-04-03 VITALS
TEMPERATURE: 98.7 F | RESPIRATION RATE: 10 BRPM | BODY MASS INDEX: 29.84 KG/M2 | DIASTOLIC BLOOD PRESSURE: 85 MMHG | WEIGHT: 208.4 LBS | HEIGHT: 70 IN | SYSTOLIC BLOOD PRESSURE: 130 MMHG | HEART RATE: 79 BPM | OXYGEN SATURATION: 95 %

## 2024-04-03 PROCEDURE — G0463 HOSPITAL OUTPT CLINIC VISIT: HCPCS | Performed by: STUDENT IN AN ORGANIZED HEALTH CARE EDUCATION/TRAINING PROGRAM

## 2024-04-03 RX ORDER — SODIUM CHLORIDE 0.9 % (FLUSH) 0.9 %
10 SYRINGE (ML) INJECTION AS NEEDED
Status: DISCONTINUED | OUTPATIENT
Start: 2024-04-03 | End: 2024-04-03 | Stop reason: HOSPADM

## 2024-04-03 RX ORDER — SODIUM CHLORIDE, SODIUM LACTATE, POTASSIUM CHLORIDE, CALCIUM CHLORIDE 600; 310; 30; 20 MG/100ML; MG/100ML; MG/100ML; MG/100ML
9 INJECTION, SOLUTION INTRAVENOUS CONTINUOUS PRN
Status: DISCONTINUED | OUTPATIENT
Start: 2024-04-03 | End: 2024-04-03 | Stop reason: HOSPADM

## 2024-04-03 RX ORDER — SODIUM CHLORIDE 0.9 % (FLUSH) 0.9 %
10 SYRINGE (ML) INJECTION EVERY 12 HOURS SCHEDULED
Status: DISCONTINUED | OUTPATIENT
Start: 2024-04-03 | End: 2024-04-03 | Stop reason: HOSPADM

## 2024-04-03 RX ORDER — SODIUM CHLORIDE 9 MG/ML
40 INJECTION, SOLUTION INTRAVENOUS AS NEEDED
Status: DISCONTINUED | OUTPATIENT
Start: 2024-04-03 | End: 2024-04-03 | Stop reason: HOSPADM

## 2024-04-03 NOTE — ANESTHESIA PREPROCEDURE EVALUATION
Anesthesia Evaluation     Patient summary reviewed and Nursing notes reviewed   no history of anesthetic complications:   NPO Solid Status: > 8 hours  NPO Liquid Status: > 6 hours           Airway   Mallampati: II  TM distance: >3 FB  Neck ROM: full  No difficulty expected  Dental - normal exam     Pulmonary     breath sounds clear to auscultation  (+) a smoker Current, Smoked day of surgery, vape,sleep apnea on CPAP    ROS comment: Wears BiPAP  Cardiovascular   Exercise tolerance: poor (<4 METS)    ECG reviewed  Rhythm: regular  Rate: normal    (+) valvular problems/murmurs murmur, murmur    ROS comment: HEART RATE= 75  bpm  RR Interval= 800  ms  MI Interval= 150  ms  P Horizontal Axis= 8  deg  P Front Axis= 47  deg  QRSD Interval= 90  ms  QT Interval= 407  ms  QTcB= 455  ms  QRS Axis= -51  deg  T Wave Axis= 31  deg  Sinus rhythm  Left anterior fascicular block  NO PRIOR TRACING AVAILABLE FOR COMPARISON  Electronically Signed By: Shen Marie ([facility]) 01-Jan-2024 20:47:02  Date and Time of Study: 2024-01-01 19:30:18      Neuro/Psych  (+) syncope, psychiatric history Depression and Anxiety    ROS Comment: Restless leg syndrome  GI/Hepatic/Renal/Endo    (+) GERD well controlled    Musculoskeletal     Abdominal  - normal exam   Substance History   (+) drug use      Comment: Hx of substance abuse: fentanyl, heroin - last use 07/2023   OB/GYN          Other        ROS/Med Hx Other: Two recent visit to the ER for SOB. Murmur found, ER doctor suggested follow up for ECHO which has not been done. Patient reports ongoing SOB today as well as feet and ankle swelling. Will postpone for cardiac work-up. Discussed with Dr. Doty.                    Anesthesia Plan    ASA 3     MAC     intravenous induction     Anesthetic plan, risks, benefits, and alternatives have been provided, discussed and informed consent has been obtained with: patient.    Use of blood products discussed with patient  Consented to blood products.     Plan discussed with CRNA and resident.        CODE STATUS:

## 2024-04-03 NOTE — TELEPHONE ENCOUNTER
Left detailed message explaining that patient would need evaluation in our office before order could be placed. Patient does have an appointment scheduled for 4/8. Advised to keep appointment and call if any further questions.

## 2024-04-03 NOTE — TELEPHONE ENCOUNTER
Caller: Lashanda Ramos    Relationship: Emergency Contact    Best call back number:     [phone] (Mobile)       What orders are you requesting (i.e. lab or imaging): ECHOCARDIOGRAM     In what timeframe would the patient need to come in: ASAP     Where will you receive your lab/imaging services: [facility]     Additional notes: CANNOT HAVE HIS COLONOSCOPY UNTIL THIS IS DONE.

## 2024-04-08 ENCOUNTER — OFFICE VISIT (OUTPATIENT)
Dept: FAMILY MEDICINE CLINIC | Facility: CLINIC | Age: 54
End: 2024-04-08
Payer: COMMERCIAL

## 2024-04-08 VITALS
RESPIRATION RATE: 16 BRPM | DIASTOLIC BLOOD PRESSURE: 74 MMHG | HEART RATE: 71 BPM | SYSTOLIC BLOOD PRESSURE: 122 MMHG | BODY MASS INDEX: 29.78 KG/M2 | OXYGEN SATURATION: 97 % | WEIGHT: 208 LBS | HEIGHT: 70 IN

## 2024-04-08 DIAGNOSIS — R06.02 SHORTNESS OF BREATH ON EXERTION: ICD-10-CM

## 2024-04-08 DIAGNOSIS — R60.0 BILATERAL LEG EDEMA: Primary | ICD-10-CM

## 2024-04-08 DIAGNOSIS — F41.9 SEVERE ANXIETY: ICD-10-CM

## 2024-04-08 PROCEDURE — 99214 OFFICE O/P EST MOD 30 MIN: CPT | Performed by: STUDENT IN AN ORGANIZED HEALTH CARE EDUCATION/TRAINING PROGRAM

## 2024-04-08 RX ORDER — DULOXETIN HYDROCHLORIDE 20 MG/1
CAPSULE, DELAYED RELEASE ORAL
Qty: 42 CAPSULE | Refills: 0 | Status: SHIPPED | OUTPATIENT
Start: 2024-04-08 | End: 2024-05-06

## 2024-04-08 RX ORDER — FLUOXETINE 10 MG/1
CAPSULE ORAL
Qty: 42 CAPSULE | Refills: 0 | Status: SHIPPED | OUTPATIENT
Start: 2024-04-08 | End: 2024-05-06

## 2024-04-09 NOTE — PROGRESS NOTES
Jens Angeles,   [facility] PRIMARY CARE  [facility]  [place] [id number] [phone]    Subjective      Name Denny Hairston MRN [id number]    1970 AGE/SEX 54 y.o. / male      Chief Complaint Chief Complaint   Patient presents with    Follow-up     Patient needs order and evaluation for echocardiogram to be done before he can have his colonoscopy          Visit History for  2024    History of Present Illness  Denny Hairston is a 54 y.o. male who presented today for Follow-up (Patient needs order and evaluation for echocardiogram to be done before he can have his colonoscopy )      The patient underwent a colonoscopy last Wednesday morning, however, the anesthesiologist was unable to proceed with the procedure stating that the patient had a murmur and new onset bilateral pitting edema. The anesthesiologist recommended an echocardiogram and cardiology clearance prior to the procedure. The patient's leg swelling has recently intensified.  He experiences constant shortness of breath, which is somewhat alleviated by walking. His stamina has decreased, and he experiences breathlessness when bending down to tie his shoes. His leg swelling is present during the day, and he also notices it in the morning, but not as severe.  He has experienced weight gain over the past year but slowly. Varicose veins are present in his lower extremities, which cause discomfort during ambulation      Patient has been placed on BiPAP for severe obstructive sleep apnea.  However he has had issues with compliance due to his anxiety attacks significantly hindering his use.     The patient has been prescribed duloxetine for his anxiety, which he takes an hour before bedtime. However, he reports no improvement in his symptoms. He was previously on trazodone for sleep, which occasionally induces shortness of breath. The accompanying adult female believes that Prozac was beneficial for him.  He was on  "Prozac in the past      Medications and Allergies   Current Outpatient Medications   Medication Instructions    DULoxetine (CYMBALTA) 20 MG capsule Take 2 capsules by mouth Daily for 14 days, THEN 1 capsule Daily for 14 days.    FLUoxetine (PROzac) 10 MG capsule Take 1 capsule by mouth Daily for 14 days, THEN 2 capsules Daily for 14 days.    methadone (DOLOPHINE) 10 mg, Oral, Daily    omeprazole (PRILOSEC) 40 mg, Oral, Every Morning    rOPINIRole (REQUIP) 0.25 mg, Oral, Nightly, Take 1 hour before bedtime.    Ventolin  (90 Base) MCG/ACT inhaler 2 puffs, Inhalation, Every 4 Hours PRN     No Known Allergies   I have reviewed the above medications and allergies     Objective:      Vitals Vitals:    24 1537   BP: 122/74   BP Location: Right arm   Patient Position: Sitting   Cuff Size: Adult   Pulse: 71   Resp: 16   SpO2: 97%   Weight: 94.3 kg (208 lb)   Height: 177.8 cm (70\")     Body mass index is 29.84 kg/m².    Physical Exam  Vitals reviewed.   Constitutional:       General: He is not in acute distress.     Appearance: He is not ill-appearing.   Cardiovascular:      Rate and Rhythm: Normal rate and regular rhythm.      Heart sounds: No murmur heard.  Pulmonary:      Effort: Pulmonary effort is normal.      Breath sounds: Normal breath sounds. No wheezing, rhonchi or rales.   Musculoskeletal:      Right lower le+ Pitting Edema present.      Left lower le+ Pitting Edema present.   Neurological:      Mental Status: He is alert.   Psychiatric:         Mood and Affect: Mood normal.         Behavior: Behavior normal.         Thought Content: Thought content normal.         Judgment: Judgment normal.            Assessment/Plan      Issues Addressed/ Plan   Diagnosis Plan   1. Bilateral leg edema  Ambulatory Referral to Cardiology      2. Shortness of breath on exertion  Ambulatory Referral to Cardiology      3. Severe anxiety  FLUoxetine (PROzac) 10 MG capsule    DULoxetine (CYMBALTA) 20 MG capsule    "      Assessment & Plan  1. Exertional dyspnea.  A referral to cardiology has been initiated for preoperative clearance.  No murmur was appreciated on physical exam today.  However he will be unable to proceed with his procedure without cardiac clearance at this point.    2. Bilateral lower extremity edema.  The edema could potentially be attributed to his sleep apnea. The patient has been advised to reduce his sodium intake. A referral to a cardiologist has been initiated.  He needs to try to find a way in order to utilize his BiPAP machine.  We discussed the importance of compliance with his treatment plan.  We discussed the possible repercussions of not utilizing his BiPAP machine which includes heart failure.  We also discussed that with his use of BiPAP this may also help improve his edema.    3. Anxiety.  The patient's duloxetine dosage will be reduced to 20 mg. The patient will commence a regimen of 40 mg of duloxetine for a period of 14 days, followed by 20 mg for the subsequent 14 days, after which he can discontinue Cymbalta. He will also commence a course of Prozac 10 mg upon reaching the 20 mg dosage, and he will cross-taper from the 10 mg to the 20 mg dose of Prozac.  Also in intervals of 14 days.      There are no Patient Instructions on file for this visit.        Follow up  recommended Return in about 1 month (around 5/8/2024).   - Dragon voice recognition software was utilized to complete this chart.  Every reasonable attempt was made to edit and correct the text, however some incorrect words may remain.   Jens Angeles DO    Patient or patient representative verbalized consent for the use of Ambient Listening during the visit with  Jens Angeles DO for chart documentation. 4/8/2024  20:24 EDT

## 2024-04-16 ENCOUNTER — OFFICE VISIT (OUTPATIENT)
Dept: CARDIOLOGY | Facility: CLINIC | Age: 54
End: 2024-04-16
Payer: COMMERCIAL

## 2024-04-16 VITALS
HEIGHT: 70 IN | DIASTOLIC BLOOD PRESSURE: 70 MMHG | BODY MASS INDEX: 29.78 KG/M2 | HEART RATE: 66 BPM | WEIGHT: 208 LBS | SYSTOLIC BLOOD PRESSURE: 122 MMHG

## 2024-04-16 DIAGNOSIS — G47.33 OBSTRUCTIVE SLEEP APNEA: ICD-10-CM

## 2024-04-16 DIAGNOSIS — I83.813 VARICOSE VEINS OF BOTH LOWER EXTREMITIES WITH PAIN: ICD-10-CM

## 2024-04-16 DIAGNOSIS — R60.0 LOWER EXTREMITY EDEMA: Primary | ICD-10-CM

## 2024-04-16 DIAGNOSIS — R06.02 SHORTNESS OF BREATH: ICD-10-CM

## 2024-04-16 DIAGNOSIS — R01.1 HEART MURMUR: ICD-10-CM

## 2024-04-16 PROCEDURE — 99204 OFFICE O/P NEW MOD 45 MIN: CPT | Performed by: STUDENT IN AN ORGANIZED HEALTH CARE EDUCATION/TRAINING PROGRAM

## 2024-04-16 PROCEDURE — 1159F MED LIST DOCD IN RCRD: CPT | Performed by: STUDENT IN AN ORGANIZED HEALTH CARE EDUCATION/TRAINING PROGRAM

## 2024-04-16 PROCEDURE — 93000 ELECTROCARDIOGRAM COMPLETE: CPT | Performed by: STUDENT IN AN ORGANIZED HEALTH CARE EDUCATION/TRAINING PROGRAM

## 2024-04-16 PROCEDURE — 1160F RVW MEDS BY RX/DR IN RCRD: CPT | Performed by: STUDENT IN AN ORGANIZED HEALTH CARE EDUCATION/TRAINING PROGRAM

## 2024-04-16 NOTE — PROGRESS NOTES
[facility]    Subjective:     Encounter Date:04/16/24      Patient ID: Denny Hairston is a 54 y.o. male.    Chief Complaint: No chief complaint on file.  Consultation for lower extremity edema possible murmur  History of Present Illness    Mr. Hairston is a pleasant 54-year-old gentleman past medical history RENE on BiPAP, moderately intolerant to BiPAP, previous history of drug use, in remission, who presents for further evaluation of lower extremity edema and a possible murmur.  He presented for an outpatient screening colonoscopy but the procedure was canceled as he was noted to have lower extremity swelling and a murmur.  An echo had not been completed.  He was referred to cardiology for further evaluation.    He was initially seen in the emergency department for dyspnea symptoms in January..His sleep study has suggested RENE which he is currently undergoing treatment for.  A CMP was checked and unremarkable, high-sensitivity troponin was checked and undetectably negative, and a BNP was also checked and normal at that time.  A1c was borderline diabetic.    He currently reports that his symptoms are baseline.  He has no significant exertional dyspnea.  He has intermittent anxiousness episodes while wearing his BiPAP.  He is lower extremity edema improves with raising legs at night.    The following portions of the patient's history were reviewed and updated as appropriate: allergies, current medications, past family history, past medical history, past social history, past surgical history and problem list.    Past Medical History:   Diagnosis Date    Depression     Drug abuse     GERD (gastroesophageal reflux disease)     RENE (obstructive sleep apnea)     wears cpap       History reviewed. No pertinent surgical history.        ECG 12 Lead    Date/Time: 4/16/2024 2:06 PM  Performed by: Sreekanth Baires MD    Authorized by: Sreekanth Baires MD  Comparison: compared with previous ECG from  "1/1/2024  Similar to previous ECG  Rhythm: sinus rhythm  Rate: normal  Conduction: left anterior fascicular block  ST Segments: ST segments normal  T Waves: T waves normal  QRS axis: left  Other findings: early transition    Clinical impression: abnormal EKG             Objective:     Vitals:    04/16/24 1354   BP: 122/70   BP Location: Left arm   Pulse: 66   Weight: 94.3 kg (208 lb)   Height: 177.8 cm (70\")         Constitutional:       Appearance: Healthy appearance. Not in distress.   Neck:      Vascular: JVD normal.   Pulmonary:      Effort: Pulmonary effort is normal.      Breath sounds: Normal breath sounds.   Cardiovascular:      PMI at left midclavicular line. Normal rate. Regular rhythm. Normal S2.       Murmurs: There is no murmur.      Comments: I appreciate no murmurs on exam.  There is no JVD.  Nonpitting edema lower extremities with varicosities  Pulses:     Intact distal pulses.   Edema:     Peripheral edema present.     Pretibial: bilateral 1+ non-pitting edema of the pretibial area.     Ankle: bilateral 1+ non-pitting edema of the ankle.  Skin:     General: Skin is warm and dry.   Neurological:      General: No focal deficit present.      Mental Status: Alert, oriented to person, place, and time and oriented to person, place and time.   Psychiatric:         Mood and Affect: Mood and affect normal.         Lab Review:     Lipid Panel          3/4/2024    00:00   Lipid Panel   Total Cholesterol 179    Triglycerides 146    HDL Cholesterol 48    VLDL Cholesterol 26    LDL Cholesterol  105      BUN   Date Value Ref Range Status   01/01/2024 10 6 - 20 mg/dL Final     Creatinine   Date Value Ref Range Status   01/01/2024 1.06 0.76 - 1.27 mg/dL Final     Potassium   Date Value Ref Range Status   01/01/2024 4.4 3.5 - 5.2 mmol/L Final     ALT (SGPT)   Date Value Ref Range Status   01/01/2024 28 1 - 41 U/L Final     AST (SGOT)   Date Value Ref Range Status   01/01/2024 36 1 - 40 U/L Final         Performed     "    Assessment:          Diagnosis Plan   1. Lower extremity edema  Adult Transthoracic Echo Complete w/ Color, Spectral and Contrast if necessary per protocol      2. Heart murmur  ECG 12 Lead      3. Varicose veins of both lower extremities with pain  Adult Transthoracic Echo Complete w/ Color, Spectral and Contrast if necessary per protocol      4. Obstructive sleep apnea  Adult Transthoracic Echo Complete w/ Color, Spectral and Contrast if necessary per protocol      5. Shortness of breath  Adult Transthoracic Echo Complete w/ Color, Spectral and Contrast if necessary per protocol             Plan:         Lower extremity edema: Has been rather persistent, there are varicosities and this is likely venous insufficiency related.He had dyspnea symptoms and a BNP was checked and completely normal back in January.  My exam today does not appreciate any significant intravascular volume overload  Will check echocardiogram  If there is no evidence of CHF, will defer further treatment recommendations to his PCP, he may need a vascular referral for lymphedema evaluation.  Dyspnea: He is working with the sleep clinic for RENE treatment.  These dyspnea spells occur paradoxically, at rest.  He has no palpitations, this does not sound ischemic.  Will check the echo first but do not feel ischemic testing is warranted at this time.  High-sensitivity troponin was checked in the setting of these dyspnea spells in the ER in January which was normal  Cardiac murmur: Echo per above.  There is report of murmur in his chart but I appreciate no murmur today.  There is no murmur with Valsalva.    Thank you for allowing me to participate in the care of Denny Hairston. Feel free to contact me directly with any further questions or concerns.    RTC as needed after echo.  If the echo is unremarkable, patient will be reassured that no further testing is needed.  He should follow-up with the sleep clinic for further sleep apnea assessment and  treatment and he may benefit from vascular evaluation of his lower extremity edema if no cardiac origin is noted.    Sreekanth Baires MD  [facility]  04/16/24  13:54 EDT       Current Outpatient Medications:     DULoxetine (CYMBALTA) 20 MG capsule, Take 2 capsules by mouth Daily for 14 days, THEN 1 capsule Daily for 14 days., Disp: 42 capsule, Rfl: 0    FLUoxetine (PROzac) 10 MG capsule, Take 1 capsule by mouth Daily for 14 days, THEN 2 capsules Daily for 14 days., Disp: 42 capsule, Rfl: 0    methadone (DOLOPHINE) 5 MG tablet, Take 2 tablets by mouth Daily., Disp: , Rfl:     omeprazole (priLOSEC) 40 MG capsule, TAKE 1 CAPSULE BY MOUTH EVERY MORNING, Disp: 30 capsule, Rfl: 1    rOPINIRole (REQUIP) 0.25 MG tablet, Take 1 tablet by mouth Every Night. Take 1 hour before bedtime., Disp: 30 tablet, Rfl: 2    Ventolin  (90 Base) MCG/ACT inhaler, INHALE 2 PUFFS EVERY 4 HOURS AS NEEDED FOR WHEEZING OR SHORTNESS OF AIR, Disp: 18 g, Rfl: 0         Return if symptoms worsen or fail to improve.      Part of this note may be an electronic transcription/translation of spoken language to printed text using the Dragon Dictation System.

## 2024-04-16 NOTE — PATIENT INSTRUCTIONS
We will get an ultrasound test of your heart to assess for any signs of heart failure or increased heart pressures that would be causing swelling of your legs.    If that test looks good, then it is likely the swelling in your legs is due to something called venous insufficiency which is best treated by a vascular specialist or by your PCP.  We will also get the ultrasound to check on this murmur, although I did not appreciate 1 today.

## 2024-04-19 DIAGNOSIS — G25.81 RESTLESS LEG: ICD-10-CM

## 2024-04-19 DIAGNOSIS — F41.9 SEVERE ANXIETY: ICD-10-CM

## 2024-04-19 RX ORDER — ROPINIROLE 0.25 MG/1
0.25 TABLET, FILM COATED ORAL NIGHTLY
Qty: 30 TABLET | Refills: 2 | Status: SHIPPED | OUTPATIENT
Start: 2024-04-19

## 2024-04-19 RX ORDER — DULOXETIN HYDROCHLORIDE 60 MG/1
60 CAPSULE, DELAYED RELEASE ORAL DAILY
Qty: 30 CAPSULE | Refills: 2 | OUTPATIENT
Start: 2024-04-19

## 2024-04-24 ENCOUNTER — HOSPITAL ENCOUNTER (OUTPATIENT)
Dept: CARDIOLOGY | Facility: HOSPITAL | Age: 54
Discharge: HOME | End: 2024-04-24
Admitting: STUDENT IN AN ORGANIZED HEALTH CARE EDUCATION/TRAINING PROGRAM
Payer: COMMERCIAL

## 2024-04-24 VITALS
BODY MASS INDEX: 29.78 KG/M2 | DIASTOLIC BLOOD PRESSURE: 82 MMHG | SYSTOLIC BLOOD PRESSURE: 122 MMHG | HEIGHT: 70 IN | HEART RATE: 62 BPM | WEIGHT: 208 LBS

## 2024-04-24 DIAGNOSIS — G47.33 OBSTRUCTIVE SLEEP APNEA: ICD-10-CM

## 2024-04-24 DIAGNOSIS — R06.02 SHORTNESS OF BREATH: ICD-10-CM

## 2024-04-24 DIAGNOSIS — I83.813 VARICOSE VEINS OF BOTH LOWER EXTREMITIES WITH PAIN: ICD-10-CM

## 2024-04-24 DIAGNOSIS — R60.0 LOWER EXTREMITY EDEMA: ICD-10-CM

## 2024-04-24 LAB
AORTIC DIMENSIONLESS INDEX: 0.9 (DI)
BH CV ECHO MEAS - AO MAX PG: 12.3 MMHG
BH CV ECHO MEAS - AO MEAN PG: 6 MMHG
BH CV ECHO MEAS - AO ROOT DIAM: 3.1 CM
BH CV ECHO MEAS - AO V2 MAX: 175 CM/SEC
BH CV ECHO MEAS - AO V2 VTI: 36.9 CM
BH CV ECHO MEAS - AVA(I,D): 2.44 CM2
BH CV ECHO MEAS - EDV(CUBED): 103.8 ML
BH CV ECHO MEAS - EDV(MOD-SP2): 115 ML
BH CV ECHO MEAS - EDV(MOD-SP4): 107 ML
BH CV ECHO MEAS - EF(MOD-BP): 59.1 %
BH CV ECHO MEAS - EF(MOD-SP2): 59.1 %
BH CV ECHO MEAS - EF(MOD-SP4): 57 %
BH CV ECHO MEAS - ESV(CUBED): 17.6 ML
BH CV ECHO MEAS - ESV(MOD-SP2): 47 ML
BH CV ECHO MEAS - ESV(MOD-SP4): 46 ML
BH CV ECHO MEAS - FS: 44.7 %
BH CV ECHO MEAS - IVS/LVPW: 0.91 CM
BH CV ECHO MEAS - IVSD: 1 CM
BH CV ECHO MEAS - LAT PEAK E' VEL: 14.6 CM/SEC
BH CV ECHO MEAS - LV DIASTOLIC VOL/BSA (35-75): 50.4 CM2
BH CV ECHO MEAS - LV MASS(C)D: 175.8 GRAMS
BH CV ECHO MEAS - LV MAX PG: 9.7 MMHG
BH CV ECHO MEAS - LV MEAN PG: 4 MMHG
BH CV ECHO MEAS - LV SYSTOLIC VOL/BSA (12-30): 21.7 CM2
BH CV ECHO MEAS - LV V1 MAX: 156 CM/SEC
BH CV ECHO MEAS - LV V1 VTI: 31.5 CM
BH CV ECHO MEAS - LVIDD: 4.7 CM
BH CV ECHO MEAS - LVIDS: 2.6 CM
BH CV ECHO MEAS - LVOT AREA: 2.9 CM2
BH CV ECHO MEAS - LVOT DIAM: 1.91 CM
BH CV ECHO MEAS - LVPWD: 1.1 CM
BH CV ECHO MEAS - MED PEAK E' VEL: 9.8 CM/SEC
BH CV ECHO MEAS - MR MAX PG: 37.9 MMHG
BH CV ECHO MEAS - MR MAX VEL: 308 CM/SEC
BH CV ECHO MEAS - MV A DUR: 0.11 SEC
BH CV ECHO MEAS - MV A MAX VEL: 69.2 CM/SEC
BH CV ECHO MEAS - MV DEC SLOPE: 445 CM/SEC2
BH CV ECHO MEAS - MV DEC TIME: 190 SEC
BH CV ECHO MEAS - MV E MAX VEL: 89.6 CM/SEC
BH CV ECHO MEAS - MV E/A: 1.3
BH CV ECHO MEAS - MV MAX PG: 3.7 MMHG
BH CV ECHO MEAS - MV MEAN PG: 1.42 MMHG
BH CV ECHO MEAS - MV P1/2T: 63.5 MSEC
BH CV ECHO MEAS - MV V2 VTI: 29.1 CM
BH CV ECHO MEAS - MVA(P1/2T): 3.5 CM2
BH CV ECHO MEAS - MVA(VTI): 3.1 CM2
BH CV ECHO MEAS - PA ACC TIME: 0.08 SEC
BH CV ECHO MEAS - PA V2 MAX: 111.2 CM/SEC
BH CV ECHO MEAS - PULM A REVS DUR: 0.13 SEC
BH CV ECHO MEAS - PULM A REVS VEL: 31 CM/SEC
BH CV ECHO MEAS - PULM DIAS VEL: 49.3 CM/SEC
BH CV ECHO MEAS - PULM S/D: 1.57
BH CV ECHO MEAS - PULM SYS VEL: 77.3 CM/SEC
BH CV ECHO MEAS - RAP SYSTOLE: 3 MMHG
BH CV ECHO MEAS - RV MAX PG: 1.61 MMHG
BH CV ECHO MEAS - RV V1 MAX: 63.4 CM/SEC
BH CV ECHO MEAS - RV V1 VTI: 15.9 CM
BH CV ECHO MEAS - RVSP: 24.1 MMHG
BH CV ECHO MEAS - SV(LVOT): 89.9 ML
BH CV ECHO MEAS - SV(MOD-SP2): 68 ML
BH CV ECHO MEAS - SV(MOD-SP4): 61 ML
BH CV ECHO MEAS - SVI(LVOT): 42.3 ML/M2
BH CV ECHO MEAS - SVI(MOD-SP2): 32 ML/M2
BH CV ECHO MEAS - SVI(MOD-SP4): 28.7 ML/M2
BH CV ECHO MEAS - TAPSE (>1.6): 2.6 CM
BH CV ECHO MEAS - TR MAX PG: 21.1 MMHG
BH CV ECHO MEAS - TR MAX VEL: 229.6 CM/SEC
BH CV ECHO MEASUREMENTS AVERAGE E/E' RATIO: 7.34
BH CV XLRA - RV BASE: 3.8 CM
BH CV XLRA - TDI S': 16 CM/SEC
LEFT ATRIUM VOLUME INDEX: 23.8 ML/M2

## 2024-04-24 PROCEDURE — 93306 TTE W/DOPPLER COMPLETE: CPT

## 2024-04-25 ENCOUNTER — TELEPHONE (OUTPATIENT)
Dept: CARDIOLOGY | Facility: CLINIC | Age: 54
End: 2024-04-25
Payer: COMMERCIAL

## 2024-04-25 NOTE — PROGRESS NOTES
Please call patient let him know that his ultrasound test of his heart is normal.  The function of his heart is normal, the relaxation of his heart is normal, and the pressures in his heart are normal.  This means that the swelling his legs is not coming from any problem with his heart, and there is no evidence of any heart failure.  The swelling in his legs is likely due to venous insufficiency, or possibly sleep apnea.  He can follow-up with his PCP for this.  Thank you very much for the help.

## 2024-04-25 NOTE — TELEPHONE ENCOUNTER
----- Message from Sreekanth Baires MD sent at 4/25/2024  9:30 AM EDT -----  Please call patient let him know that his ultrasound test of his heart is normal.  The function of his heart is normal, the relaxation of his heart is normal, and the pressures in his heart are normal.  This means that the swelling his legs is not coming from any problem with his heart, and there is no evidence of any heart failure.  The swelling in his legs is likely due to venous insufficiency, or possibly sleep apnea.  He can follow-up with his PCP for this.  Thank you very much for the help.

## 2024-04-25 NOTE — TELEPHONE ENCOUNTER
Pt's significant other Lashanda called back in to triage.  Results and recommendations reviewed with Lashanda.  Instructed to call with any further questions or concerns.  Verbalized understanding.    Sonja Hernandez RN  Triage Nurse, [facility] [place]  04/25/24 09:39 EDT

## 2024-04-30 ENCOUNTER — OFFICE VISIT (OUTPATIENT)
Dept: SLEEP MEDICINE | Facility: HOSPITAL | Age: 54
End: 2024-04-30
Payer: COMMERCIAL

## 2024-04-30 VITALS
HEART RATE: 66 BPM | DIASTOLIC BLOOD PRESSURE: 69 MMHG | BODY MASS INDEX: 30.06 KG/M2 | SYSTOLIC BLOOD PRESSURE: 106 MMHG | WEIGHT: 210 LBS | HEIGHT: 70 IN | OXYGEN SATURATION: 94 %

## 2024-04-30 DIAGNOSIS — F51.02 ADJUSTMENT INSOMNIA: ICD-10-CM

## 2024-04-30 DIAGNOSIS — G47.33 OBSTRUCTIVE SLEEP APNEA: Primary | ICD-10-CM

## 2024-04-30 PROCEDURE — G0463 HOSPITAL OUTPT CLINIC VISIT: HCPCS

## 2024-04-30 RX ORDER — TRAZODONE HYDROCHLORIDE 100 MG/1
100 TABLET ORAL NIGHTLY
Qty: 30 TABLET | Refills: 5 | Status: SHIPPED | OUTPATIENT
Start: 2024-04-30

## 2024-04-30 NOTE — PROGRESS NOTES
SLEEP/PULMONARY  CLINIC NOTE      PATIENT IDENTIFICATION:  Name: Denny Hairston  Age: 54 y.o.  Sex: male  :  1970  MRN: [id number]    DATE OF CONSULTATION:  2024                     CHIEF COMPLAINT: Obstructive sleep apnea    History of Present Illness:   Denny Hairston is a 54 y.o. male patient obstructive sleep apnea started on the CPAP his compliance is fluctuating but still not meeting the criteria, he reported because he is feeling the same is something on his face he is to take it off, also reporting to me that he has history of insomnia and currently is not able to maintain his sleep even if he is sleeping on his back, and multiple waking up at night patient with significant anxiety disorder is currently on Prozac and Cymbalta      Review of Systems:   Constitutional: negative   Eyes: negative   ENT/oropharynx: negative   Cardiovascular: negative   Respiratory: negative   Gastrointestinal: negative   Genitourinary: negative   Neurological: negative   Musculoskeletal: negative   Integument/breast: negative   Endocrine: negative   Allergic/Immunologic: negative     Past Medical History:  Past Medical History:   Diagnosis Date    Depression     Drug abuse     GERD (gastroesophageal reflux disease)     RENE (obstructive sleep apnea)     wears cpap       Past Surgical History:  No past surgical history on file.     Family History:  Family History   Problem Relation Age of Onset    Cancer Mother     Cancer Father     Cirrhosis Father     Liver disease Sister     Cancer Brother         Social History:   Social History     Tobacco Use    Smoking status: Every Day     Current packs/day: 0.25     Types: Cigarettes     Passive exposure: Current    Smokeless tobacco: Never   Substance Use Topics    Alcohol use: Not Currently        Allergies:  No Known Allergies    Home Meds:  (Not in a hospital admission)      Objective:    Vitals Ranges:   Heart Rate:  [66] 66  BP: (106)/(69) 106/69  Body mass index is 30.13  kg/m².     Exam:  General Appearance:  WDWN    HEENT:   without obvious abnormality,  Conjunctiva/corneas clear,  Normal external ear canals, no drainage    Clear orsalmucosa,  Mallampati score 3    Neck:  Supple, symmetrical, trachea midline. No JVD.  Lungs:   Bilateral basal rhonchi bilaterally, respirations unlabored symmetrical wall movement.    Chest wall:  No tenderness or deformity.    Heart:  Regular rate and rhythm, S1 and S2 normal.  Extremities: Trace edema no clubbing or Cyanosis        Data Review:  All labs (24hrs): No results found for this or any previous visit (from the past 24 hour(s)).     Imaging:  Adult Transthoracic Echo Complete w/ Color, Spectral and Contrast if necessary per protocol    Left ventricular systolic function is normal. Calculated left   ventricular EF = 59.1%    Left ventricular diastolic function was normal.    No significant valvular abnormalities.       ASSESSMENT:  Diagnoses and all orders for this visit:    Obstructive sleep apnea    Adjustment insomnia    Other orders  -     traZODone (DESYREL) 100 MG tablet; Take 1 tablet by mouth Every Night.        PLAN:  Set a bedtime that is early enough for you to get at least 7 hours of sleep.  Don’t go to bed unless you are sleepy.  If you don’t fall asleep after 20 minutes, get out of bed.  Establish a relaxing bedtime routine.  Use your bed only for sleep and sex.  Make your bedroom quiet and relaxing. Keep the room at a comfortable, cool temperature.  Limit exposure to bright light in the evenings.  Turn off electronic devices at least 30 minutes before bedtime.  Don’t eat a large meal before bedtime. If you are hungry at night, eat a light, healthy snack.  Exercise regularly and maintain a healthy diet.  Avoid consuming caffeine in the late afternoon or evening.  Avoid consuming alcohol before bedtime.  Reduce your fluid intake before bedtime.  Avoid naps during the day time.     This patient with obstructive sleep apnea,  compliance is improved. Encourage to use it more frequent, I re-emphasized on pt the long and short term benefit of treating RENE. The device is benefiting the patient.  The patient is also instructed to get the CPAP supplies from the [employer] company and see me in 1 year for follow-up.  Discussed with the patient with the tips to improve compliance    Follow-up 6-week    Nando Matos MD. D, ABSM.  4/30/2024  15:18 EDT      Electrodesiccation Text: The wound bed was treated with electrodesiccation after the biopsy was performed.

## 2024-05-05 DIAGNOSIS — F41.9 SEVERE ANXIETY: ICD-10-CM

## 2024-05-07 RX ORDER — DULOXETIN HYDROCHLORIDE 20 MG/1
CAPSULE, DELAYED RELEASE ORAL
Qty: 42 CAPSULE | Refills: 0 | Status: SHIPPED | OUTPATIENT
Start: 2024-05-07

## 2024-05-08 PROBLEM — F17.200 TOBACCO DEPENDENCE SYNDROME: Status: ACTIVE | Noted: 2024-05-08

## 2024-05-08 PROBLEM — K21.9 GASTROESOPHAGEAL REFLUX DISEASE: Status: ACTIVE | Noted: 2024-05-08

## 2024-05-08 PROBLEM — F15.10 METHAMPHETAMINE ABUSE: Status: ACTIVE | Noted: 2024-05-08

## 2024-05-10 DIAGNOSIS — F41.9 SEVERE ANXIETY: ICD-10-CM

## 2024-05-13 DIAGNOSIS — F41.9 SEVERE ANXIETY: ICD-10-CM

## 2024-05-13 RX ORDER — FLUOXETINE 10 MG/1
CAPSULE ORAL
Qty: 42 CAPSULE | Refills: 0 | OUTPATIENT
Start: 2024-05-13

## 2024-05-14 RX ORDER — FLUOXETINE HYDROCHLORIDE 20 MG/1
20 CAPSULE ORAL DAILY
Qty: 30 CAPSULE | Refills: 2 | Status: SHIPPED | OUTPATIENT
Start: 2024-05-14

## 2024-05-15 DIAGNOSIS — F41.9 SEVERE ANXIETY: ICD-10-CM

## 2024-05-15 RX ORDER — DULOXETIN HYDROCHLORIDE 20 MG/1
CAPSULE, DELAYED RELEASE ORAL
Qty: 42 CAPSULE | Refills: 0 | OUTPATIENT
Start: 2024-05-15

## 2024-06-10 ENCOUNTER — TELEPHONE (OUTPATIENT)
Dept: GASTROENTEROLOGY | Facility: CLINIC | Age: 54
End: 2024-06-10
Payer: COMMERCIAL

## 2024-06-10 ENCOUNTER — OFFICE VISIT (OUTPATIENT)
Dept: FAMILY MEDICINE CLINIC | Facility: CLINIC | Age: 54
End: 2024-06-10
Payer: COMMERCIAL

## 2024-06-10 VITALS
OXYGEN SATURATION: 96 % | BODY MASS INDEX: 28.92 KG/M2 | HEIGHT: 70 IN | RESPIRATION RATE: 16 BRPM | WEIGHT: 202 LBS | SYSTOLIC BLOOD PRESSURE: 92 MMHG | HEART RATE: 60 BPM | DIASTOLIC BLOOD PRESSURE: 64 MMHG

## 2024-06-10 DIAGNOSIS — E78.2 MIXED HYPERLIPIDEMIA: ICD-10-CM

## 2024-06-10 DIAGNOSIS — K21.9 GASTROESOPHAGEAL REFLUX DISEASE WITHOUT ESOPHAGITIS: Primary | ICD-10-CM

## 2024-06-10 DIAGNOSIS — R73.03 PRE-DIABETES: ICD-10-CM

## 2024-06-10 DIAGNOSIS — R60.0 BILATERAL LEG EDEMA: ICD-10-CM

## 2024-06-10 DIAGNOSIS — D64.9 ANEMIA, UNSPECIFIED TYPE: ICD-10-CM

## 2024-06-10 DIAGNOSIS — D72.829 LEUKOCYTOSIS, UNSPECIFIED TYPE: ICD-10-CM

## 2024-06-10 DIAGNOSIS — K21.9 GASTROESOPHAGEAL REFLUX DISEASE, UNSPECIFIED WHETHER ESOPHAGITIS PRESENT: ICD-10-CM

## 2024-06-10 DIAGNOSIS — G25.81 RESTLESS LEG: ICD-10-CM

## 2024-06-10 DIAGNOSIS — E55.9 VITAMIN D DEFICIENCY: ICD-10-CM

## 2024-06-10 PROCEDURE — 1159F MED LIST DOCD IN RCRD: CPT | Performed by: STUDENT IN AN ORGANIZED HEALTH CARE EDUCATION/TRAINING PROGRAM

## 2024-06-10 PROCEDURE — 99214 OFFICE O/P EST MOD 30 MIN: CPT | Performed by: STUDENT IN AN ORGANIZED HEALTH CARE EDUCATION/TRAINING PROGRAM

## 2024-06-10 PROCEDURE — 1160F RVW MEDS BY RX/DR IN RCRD: CPT | Performed by: STUDENT IN AN ORGANIZED HEALTH CARE EDUCATION/TRAINING PROGRAM

## 2024-06-10 RX ORDER — OMEPRAZOLE 40 MG/1
40 CAPSULE, DELAYED RELEASE ORAL EVERY MORNING
Qty: 90 CAPSULE | Refills: 0 | Status: SHIPPED | OUTPATIENT
Start: 2024-06-10

## 2024-06-10 NOTE — TELEPHONE ENCOUNTER
Lashanda, called and left message on my voicemail at 4:01pm, to schedule colonoscopy for patient.    Reviewed chart, was scheduled with Dr. Doty on 04/03/2024, but was canceled.    Please call to schedule at [phone].

## 2024-06-10 NOTE — PROGRESS NOTES
Jens Angeles DO  [facility]  [facility]  [place] [id number] [phone]    Subjective      Name Denny Hairston MRN [id number]    1970 AGE/SEX 54 y.o. / male      Chief Complaint Chief Complaint   Patient presents with    Anxiety     2 month check up          Visit History for  06/10/2024    Denny Hairston is a 54 y.o. male who presented today for Anxiety (2 month check up )     History of Present Illness  The patient presents for evaluation of multiple medical concerns.    The patient reports overall good health. His pulmonologist prescribed trazodone, however, he expresses dissatisfaction with this medication. He experiences nocturnal awakenings due to breathing difficulties, which subsequently induces nausea and grogginess, necessitating a 2-hour interval to regain his energy. His current regimen includes half a tablet of trazodone 100 mg, although he does not take it nightly. He has a follow-up appointment with his pulmonologist tomorrow. He continues to utilize his CPAP machine.    The patient reports an improvement in his anxiety symptoms.    The patient's leg swelling has subsided. He has discontinued his restless leg syndrome medication.    The patient continues to take methadone.    The patient's acid reflux is exacerbated when he is not taking omeprazole.    The patient has not been adhering to a healthy diet.       Medications and Allergies   Current Outpatient Medications   Medication Instructions    FLUoxetine (PROZAC) 20 mg, Oral, Daily    methadone (DOLOPHINE) 10 mg, Oral, Daily    omeprazole (PRILOSEC) 40 mg, Oral, Every Morning    traZODone (DESYREL) 100 mg, Oral, Nightly    Ventolin  (90 Base) MCG/ACT inhaler 2 puffs, Inhalation, Every 4 Hours PRN     No Known Allergies   I have reviewed the above medications and allergies     Objective:      Vitals Vitals:    06/10/24 1548   BP: 92/64   BP Location: Left arm   Patient Position: Sitting  "  Cuff Size: Adult   Pulse: 60   Resp: 16   SpO2: 96%   Weight: 91.6 kg (202 lb)   Height: 177.8 cm (70\")     Body mass index is 28.98 kg/m².    Physical Exam  Vitals reviewed.   Constitutional:       General: He is not in acute distress.     Appearance: He is not ill-appearing.   Pulmonary:      Effort: Pulmonary effort is normal.   Psychiatric:         Mood and Affect: Mood normal.         Behavior: Behavior normal.         Thought Content: Thought content normal.         Judgment: Judgment normal.        Physical Exam       Results  Laboratory Studies  Cholesterol was high. Blood sugar was borderline diabetic.     Assessment/Plan   Issues Addressed/ Plan   Diagnosis Plan   1. Gastroesophageal reflux disease without esophagitis  Ambulatory Referral to Gastroenterology      2. Restless leg        3. Bilateral leg edema  Comprehensive metabolic panel      4. Mixed hyperlipidemia  Lipid panel      5. Pre-diabetes  Hemoglobin A1c      6. Anemia, unspecified type  CBC w AUTO Differential    Vitamin B12    Iron Profile    Folate      7. Leukocytosis, unspecified type  CBC w AUTO Differential      8. Vitamin D deficiency  Vitamin D 25 hydroxy         Assessment & Plan  1. Insomnia.  The patient is advised to take half a tablet of trazodone 100 mg.    2. Acid reflux.  A referral has been made to Dr. Simmons for possible upper endoscopy and further evaluation and treatment    3. Health maintenance.  The patient's cholesterol and glucose levels are elevated, indicating borderline diabetes. Laboratory tests have been ordered, to be completed one week prior to the next appointment. Dietary modifications, including walking for 15 to 30 minutes daily, have been recommended.    Follow-up  A follow-up appointment is scheduled for 08/2024.           There are no Patient Instructions on file for this visit.   Follow up  recommended Return in about 2 months (around 8/10/2024).   - Dragon voice recognition software was utilized to " complete this chart.  Every reasonable attempt was made to edit and correct the text, however some incorrect words may remain.   Jens Angeles DO    Patient or patient representative verbalized consent for the use of Ambient Listening during the visit with  Jens Angeles DO for chart documentation. 6/15/2024  20:19 EDT

## 2024-06-11 ENCOUNTER — OFFICE VISIT (OUTPATIENT)
Dept: SLEEP MEDICINE | Facility: HOSPITAL | Age: 54
End: 2024-06-11
Payer: COMMERCIAL

## 2024-06-11 VITALS
WEIGHT: 200 LBS | SYSTOLIC BLOOD PRESSURE: 95 MMHG | HEIGHT: 70 IN | HEART RATE: 68 BPM | OXYGEN SATURATION: 95 % | BODY MASS INDEX: 28.63 KG/M2 | DIASTOLIC BLOOD PRESSURE: 57 MMHG

## 2024-06-11 DIAGNOSIS — G47.33 OBSTRUCTIVE SLEEP APNEA: Primary | ICD-10-CM

## 2024-06-11 DIAGNOSIS — K21.9 GASTROESOPHAGEAL REFLUX DISEASE WITHOUT ESOPHAGITIS: ICD-10-CM

## 2024-06-11 DIAGNOSIS — F51.01 PRIMARY INSOMNIA: ICD-10-CM

## 2024-06-11 PROCEDURE — G0463 HOSPITAL OUTPT CLINIC VISIT: HCPCS

## 2024-06-11 NOTE — PROGRESS NOTES
SLEEP/PULMONARY  CLINIC NOTE      PATIENT IDENTIFICATION:  Name: Denny Hairston  Age: 54 y.o.  Sex: male  :  1970  MRN: [id number]    DATE OF CONSULTATION:  2024                     CHIEF COMPLAINT: RENE    History of Present Illness:   Denny Hairston is a 54 y.o. male Pt with Obstructive sleep apnea with poor compliance due to the mask still  with sleepiness fatigue and snoring, witnessed apnea, Hard  to get up in the morning. Daytime fatigue sleepiness loss of energy, discussed with patient about the benefit of treatment for RENE.   Patient insomnia took trazodone for few nights.  Feeling sleepy during the day so he stopped taking it      Review of Systems:   Constitutional: negative   Eyes: negative   ENT/oropharynx: negative   Cardiovascular: negative   Respiratory: negative   Gastrointestinal: negative   Genitourinary: negative   Neurological: negative   Musculoskeletal: negative   Integument/breast: negative   Endocrine: negative   Allergic/Immunologic: negative     Past Medical History:  Past Medical History:   Diagnosis Date    Depression     Drug abuse     GERD (gastroesophageal reflux disease)     RENE (obstructive sleep apnea)     wears cpap       Past Surgical History:  No past surgical history on file.     Family History:  Family History   Problem Relation Age of Onset    Cancer Mother     Cancer Father     Cirrhosis Father     Liver disease Sister     Cancer Brother         Social History:   Social History     Tobacco Use    Smoking status: Every Day     Current packs/day: 0.25     Types: Cigarettes     Passive exposure: Current    Smokeless tobacco: Never   Substance Use Topics    Alcohol use: Not Currently        Allergies:  No Known Allergies    Home Meds:  (Not in a hospital admission)      Objective:    Vitals Ranges:   Heart Rate:  [60-68] 68  Resp:  [16] 16  BP: (92-95)/(57-64) 95/57  Body mass index is 28.7 kg/m².     Exam:  General Appearance:  WDWN    HEENT:   without obvious  abnormality,  Conjunctiva/corneas clear,  Normal external ear canals, no drainage    Clear orsalmucosa,  Mallampati score 3    Neck:  Supple, symmetrical, trachea midline. No JVD.  Lungs:   Bilateral basal rhonchi bilaterally, respirations unlabored symmetrical wall movement.    Chest wall:  No tenderness or deformity.    Heart:  Regular rate and rhythm, S1 and S2 normal.  Extremities: Trace edema no clubbing or Cyanosis        Data Review:  All labs (24hrs): No results found for this or any previous visit (from the past 24 hour(s)).     Imaging:  Adult Transthoracic Echo Complete w/ Color, Spectral and Contrast if necessary per protocol    Left ventricular systolic function is normal. Calculated left   ventricular EF = 59.1%    Left ventricular diastolic function was normal.    No significant valvular abnormalities.       ASSESSMENT:  Diagnoses and all orders for this visit:    Obstructive sleep apnea    Primary insomnia    Gastroesophageal reflux disease without esophagitis        PLAN:      This patient with obstructive sleep apnea, compliance is improved. Encourage to use it more frequent, I re-emphasized on pt the long and short term benefit of treating RENE. The device is benefiting the patient.  The patient is also instructed to get the CPAP supplies from the "[employer] and see me in 1 year for follow-up.    Set a bedtime that is early enough for you to get at least 7 hours of sleep.  Don’t go to bed unless you are sleepy.  If you don’t fall asleep after 20 minutes, get out of bed.  Establish a relaxing bedtime routine.  Use your bed only for sleep and sex.  Make your bedroom quiet and relaxing. Keep the room at a comfortable, cool temperature.  Limit exposure to bright light in the evenings.  Turn off electronic devices at least 30 minutes before bedtime.  Don’t eat a large meal before bedtime. If you are hungry at night, eat a light, healthy snack.  Exercise regularly and maintain a healthy diet.  Avoid  consuming caffeine in the late afternoon or evening.  Avoid consuming alcohol before bedtime.  Reduce your fluid intake before bedtime.  Avoid naps during the day time.  Change trazodone to half tablet 50 mg nightly and if still having sleepiness during the day might cut down    It is recommended to keep thyroid function optimized to improve quality of sleep    Follow-up 3 months    Nando Matos MD. D, ABSM.  6/11/2024  15:01 EDT

## 2024-07-12 ENCOUNTER — APPOINTMENT (OUTPATIENT)
Dept: CT IMAGING | Facility: HOSPITAL | Age: 54
End: 2024-07-12
Payer: COMMERCIAL

## 2024-07-12 ENCOUNTER — TELEPHONE (OUTPATIENT)
Dept: ORTHOPEDIC SURGERY | Facility: CLINIC | Age: 54
End: 2024-07-12

## 2024-07-12 ENCOUNTER — HOSPITAL ENCOUNTER (EMERGENCY)
Dept: CARDIOLOGY | Facility: HOSPITAL | Age: 54
Discharge: HOME | End: 2024-07-12
Payer: COMMERCIAL

## 2024-07-12 ENCOUNTER — HOSPITAL ENCOUNTER (EMERGENCY)
Facility: HOSPITAL | Age: 54
Discharge: HOME | End: 2024-07-12
Attending: EMERGENCY MEDICINE
Payer: COMMERCIAL

## 2024-07-12 ENCOUNTER — APPOINTMENT (OUTPATIENT)
Dept: GENERAL RADIOLOGY | Facility: HOSPITAL | Age: 54
End: 2024-07-12
Payer: COMMERCIAL

## 2024-07-12 VITALS
TEMPERATURE: 98.7 F | HEART RATE: 72 BPM | HEIGHT: 71 IN | SYSTOLIC BLOOD PRESSURE: 143 MMHG | RESPIRATION RATE: 18 BRPM | OXYGEN SATURATION: 98 % | BODY MASS INDEX: 29.4 KG/M2 | DIASTOLIC BLOOD PRESSURE: 69 MMHG | WEIGHT: 210 LBS

## 2024-07-12 DIAGNOSIS — D64.9 ANEMIA, UNSPECIFIED TYPE: ICD-10-CM

## 2024-07-12 DIAGNOSIS — T07.XXXA MULTIPLE ABRASIONS: ICD-10-CM

## 2024-07-12 DIAGNOSIS — R55 SYNCOPE AND COLLAPSE: Primary | ICD-10-CM

## 2024-07-12 DIAGNOSIS — S62.102A CLOSED FRACTURE OF LEFT WRIST, INITIAL ENCOUNTER: ICD-10-CM

## 2024-07-12 DIAGNOSIS — R55 SYNCOPE AND COLLAPSE: ICD-10-CM

## 2024-07-12 LAB
ALBUMIN SERPL-MCNC: 4.1 G/DL (ref 3.5–5.2)
ALBUMIN/GLOB SERPL: 1.6 G/DL
ALP SERPL-CCNC: 99 U/L (ref 39–117)
ALT SERPL W P-5'-P-CCNC: 29 U/L (ref 1–41)
AMPHET+METHAMPHET UR QL: NEGATIVE
AMPHETAMINES UR QL: NEGATIVE
ANION GAP SERPL CALCULATED.3IONS-SCNC: 8.8 MMOL/L (ref 5–15)
AST SERPL-CCNC: 47 U/L (ref 1–40)
BARBITURATES UR QL SCN: NEGATIVE
BASOPHILS # BLD AUTO: 0.04 10*3/MM3 (ref 0–0.2)
BASOPHILS NFR BLD AUTO: 0.5 % (ref 0–1.5)
BENZODIAZ UR QL SCN: NEGATIVE
BILIRUB SERPL-MCNC: 0.3 MG/DL (ref 0–1.2)
BUN SERPL-MCNC: 13 MG/DL (ref 6–20)
BUN/CREAT SERPL: 13.5 (ref 7–25)
BUPRENORPHINE SERPL-MCNC: NEGATIVE NG/ML
CALCIUM SPEC-SCNC: 9.1 MG/DL (ref 8.6–10.5)
CANNABINOIDS SERPL QL: POSITIVE
CHLORIDE SERPL-SCNC: 99 MMOL/L (ref 98–107)
CO2 SERPL-SCNC: 30.2 MMOL/L (ref 22–29)
COCAINE UR QL: POSITIVE
CREAT SERPL-MCNC: 0.96 MG/DL (ref 0.76–1.27)
DEPRECATED RDW RBC AUTO: 46.8 FL (ref 37–54)
EGFRCR SERPLBLD CKD-EPI 2021: 93.3 ML/MIN/1.73
EOSINOPHIL # BLD AUTO: 0.14 10*3/MM3 (ref 0–0.4)
EOSINOPHIL NFR BLD AUTO: 1.8 % (ref 0.3–6.2)
ERYTHROCYTE [DISTWIDTH] IN BLOOD BY AUTOMATED COUNT: 16.8 % (ref 12.3–15.4)
ETHANOL BLD-MCNC: <10 MG/DL (ref 0–10)
ETHANOL UR QL: <0.01 %
GLOBULIN UR ELPH-MCNC: 2.5 GM/DL
GLUCOSE SERPL-MCNC: 93 MG/DL (ref 65–99)
HCT VFR BLD AUTO: 33.4 % (ref 37.5–51)
HGB BLD-MCNC: 9.7 G/DL (ref 13–17.7)
IMM GRANULOCYTES # BLD AUTO: 0.03 10*3/MM3 (ref 0–0.05)
IMM GRANULOCYTES NFR BLD AUTO: 0.4 % (ref 0–0.5)
LYMPHOCYTES # BLD AUTO: 1.77 10*3/MM3 (ref 0.7–3.1)
LYMPHOCYTES NFR BLD AUTO: 22.3 % (ref 19.6–45.3)
MCH RBC QN AUTO: 22.5 PG (ref 26.6–33)
MCHC RBC AUTO-ENTMCNC: 29 G/DL (ref 31.5–35.7)
MCV RBC AUTO: 77.3 FL (ref 79–97)
METHADONE UR QL SCN: POSITIVE
MONOCYTES # BLD AUTO: 0.69 10*3/MM3 (ref 0.1–0.9)
MONOCYTES NFR BLD AUTO: 8.7 % (ref 5–12)
NEUTROPHILS NFR BLD AUTO: 5.28 10*3/MM3 (ref 1.7–7)
NEUTROPHILS NFR BLD AUTO: 66.3 % (ref 42.7–76)
NRBC BLD AUTO-RTO: 0 /100 WBC (ref 0–0.2)
OPIATES UR QL: NEGATIVE
OXYCODONE UR QL SCN: NEGATIVE
PCP UR QL SCN: NEGATIVE
PLATELET # BLD AUTO: 211 10*3/MM3 (ref 140–450)
PMV BLD AUTO: 8.3 FL (ref 6–12)
POTASSIUM SERPL-SCNC: 4.2 MMOL/L (ref 3.5–5.2)
PROT SERPL-MCNC: 6.6 G/DL (ref 6–8.5)
QT INTERVAL: 453 MS
QTC INTERVAL: 433 MS
RBC # BLD AUTO: 4.32 10*6/MM3 (ref 4.14–5.8)
SODIUM SERPL-SCNC: 138 MMOL/L (ref 136–145)
TRICYCLICS UR QL SCN: NEGATIVE
TROPONIN T SERPL HS-MCNC: <6 NG/L
WBC NRBC COR # BLD AUTO: 7.95 10*3/MM3 (ref 3.4–10.8)

## 2024-07-12 PROCEDURE — 80306 DRUG TEST PRSMV INSTRMNT: CPT | Performed by: EMERGENCY MEDICINE

## 2024-07-12 PROCEDURE — 93246 EXT ECG>7D<15D RECORDING: CPT

## 2024-07-12 PROCEDURE — 93010 ELECTROCARDIOGRAM REPORT: CPT | Performed by: INTERNAL MEDICINE

## 2024-07-12 PROCEDURE — 80053 COMPREHEN METABOLIC PANEL: CPT | Performed by: EMERGENCY MEDICINE

## 2024-07-12 PROCEDURE — 73110 X-RAY EXAM OF WRIST: CPT

## 2024-07-12 PROCEDURE — 99284 EMERGENCY DEPT VISIT MOD MDM: CPT

## 2024-07-12 PROCEDURE — 84484 ASSAY OF TROPONIN QUANT: CPT | Performed by: EMERGENCY MEDICINE

## 2024-07-12 PROCEDURE — 82077 ASSAY SPEC XCP UR&BREATH IA: CPT | Performed by: EMERGENCY MEDICINE

## 2024-07-12 PROCEDURE — 70450 CT HEAD/BRAIN W/O DYE: CPT

## 2024-07-12 PROCEDURE — 85025 COMPLETE CBC W/AUTO DIFF WBC: CPT | Performed by: EMERGENCY MEDICINE

## 2024-07-12 PROCEDURE — 93005 ELECTROCARDIOGRAM TRACING: CPT | Performed by: EMERGENCY MEDICINE

## 2024-07-12 RX ORDER — NALOXONE HYDROCHLORIDE 4 MG/.1ML
SPRAY NASAL
Qty: 2 EACH | Refills: 0 | Status: SHIPPED | OUTPATIENT
Start: 2024-07-12

## 2024-07-12 RX ORDER — TRAMADOL HYDROCHLORIDE 50 MG/1
50 TABLET ORAL EVERY 6 HOURS PRN
Qty: 30 TABLET | Refills: 0 | Status: SHIPPED | OUTPATIENT
Start: 2024-07-12

## 2024-07-12 RX ORDER — TRAMADOL HYDROCHLORIDE 50 MG/1
50 TABLET ORAL EVERY 6 HOURS PRN
Qty: 30 TABLET | Refills: 0 | Status: SHIPPED | OUTPATIENT
Start: 2024-07-12 | End: 2024-07-12

## 2024-07-12 NOTE — ED PROVIDER NOTES
Subjective   History of Present Illness  This 54-year-old white male fell twice today.  He states that he fell asleep.  He presents complaining of left wrist pain.  He also has an abrasion to his forehead.  He is on sleep apnea machine.  Sees a sleep specialist.  Patient states he is not driving.  He has no other complaints.      PCP:Jens Angeles DO    Review of Systems   HENT:          Forehead abrasion   Musculoskeletal:         Left wrist pain       Past Medical History:   Diagnosis Date    Depression     Drug abuse     GERD (gastroesophageal reflux disease)     RENE (obstructive sleep apnea)     wears cpap       No Known Allergies    No past surgical history on file.    Family History   Problem Relation Age of Onset    Cancer Mother     Cancer Father     Cirrhosis Father     Liver disease Sister     Cancer Brother        Social History     Socioeconomic History    Marital status: Single   Tobacco Use    Smoking status: Every Day     Current packs/day: 0.25     Types: Cigarettes     Passive exposure: Current    Smokeless tobacco: Never   Vaping Use    Vaping status: Every Day    Substances: Nicotine, Flavoring    Devices: Pre-filled pod    Passive vaping exposure: Yes   Substance and Sexual Activity    Alcohol use: Not Currently    Drug use: Yes     Comment: fentanyl, heroin - last use 07/2023    Sexual activity: Yes           Objective   Physical Exam  Vitals reviewed: The temperature is 98.7 °F, pulse 67, respirations 20, /88, room air pulse ox 98%..   Constitutional:       Appearance: Normal appearance.   HENT:      Head: Normocephalic.      Comments: Bruising noted to the forehead  Cardiovascular:      Rate and Rhythm: Normal rate and regular rhythm.      Heart sounds: Murmur heard.      No friction rub. No gallop.   Pulmonary:      Effort: Pulmonary effort is normal.      Breath sounds: Normal breath sounds.   Abdominal:      General: Abdomen is flat. Bowel sounds are normal. There is no distension.       Palpations: Abdomen is soft. There is no mass.      Tenderness: There is no abdominal tenderness. There is no right CVA tenderness, left CVA tenderness, guarding or rebound.      Hernia: No hernia is present.   Genitourinary:     Comments: Rectal exam: Heme-negative, normal tone, no masses.  Musculoskeletal:         General: No swelling or tenderness. Normal range of motion.      Comments: There is tenderness to the extensor surface of the left wrist.  The capillary refills less than 2 seconds.  The sensation is intact.  There is normal range of motion noted.  There is no joint laxity noted.   Skin:     General: Skin is warm and dry.   Neurological:      General: No focal deficit present.      Mental Status: He is alert and oriented to person, place, and time.      Cranial Nerves: No cranial nerve deficit.      Sensory: No sensory deficit.      Motor: No weakness.         Procedures           ED Course  ED Course as of 07/12/24 1017   Fri Jul 12, 2024   0959 The CMP is unremarkable. [RC]   1008 Urine drug screen shows methadone which the patient is on.  It shows cocaine and THC. [RC]   1008 The hemoglobin is 9.7, the hematocrit 33, the CBC is otherwise unremarkable [RC]   1008 The ethanol is less than 0.010. [RC]   1009 He has history of troponin is less than 6 and normal [RC]   1009 On 1/1/2024 the hemoglobin was 10.9.  Hemoglobin is slightly lower today. [RC]      ED Course User Index  [RC] Santosh Talamantes MD      08:51 EDT, 07/12/24:  The EKG was obtained at 0 849 and read by me at 0 849.  EKG shows a sinus bradycardia with a rate of 55.  There is a normal axis with no hypertrophy.  The ND, QRS, QT intervals are unremarkable.  There is no ectopy.  There is no acute ST elevation or depression.                           09:33 EDT, 07/12/24:  Patient had a Velcro wrist splint applied by the technician.  After application of splint, it was assessed by me and noted to be in good position with the left upper  extremity being neurovascularly intact.  The abrasions were cleaned and bacitracin ointment was applied.  The orthostatics are negative.    09:34 EDT, 07/12/24:  Patient should ice the left wrist for 20 minutes every 2-3 hours while awake and keep the left wrist elevated on a pillow.  Take patient should take the Ultram as needed as directed for pain.  The patient should take Colace as directed as needed for constipation if he is taking the Ultram for pain.  Patient should call Dr. Montano today for follow-up appointment within 1 week.  Patient should call Dr. Angeles today for follow-up appointment within 1 week.  Patient should call his sleep specialist today for follow-up appointment within 1 week.  Patient should return to the emergency department if there is increased pain not relieved with the Ultram, numbness, tingling, weakness, change in color or temperature of the left upper extremity, worse in any way at all.  All the patient's questions were answered he will be discharged in good condition.  The patient has been instructed to not drive.  10:17 EDT, 07/12/24:  The patient's diagnosis of anemia was discussed with him.  The patient has an appointment with a gastroenterologist on July 30 for scheduling a screening colonoscopy.      10:18 EDT, 07/12/24:  The patient was reassessed.  He has no new complaints.  All of his questions were answered, the patient will be discharged in good condition          Medical Decision Making  Problems Addressed:  Closed fracture of left wrist, initial encounter: complicated acute illness or injury  Multiple abrasions: complicated acute illness or injury    Amount and/or Complexity of Data Reviewed  Labs: ordered.  Radiology: ordered.  ECG/medicine tests: ordered.    Risk  Prescription drug management.        Final diagnoses:   Closed fracture of left wrist, initial encounter   Multiple abrasions   Anemia, unspecified type       ED Disposition  ED Disposition       None             No follow-up provider specified.       Medication List      No changes were made to your prescriptions during this visit.            Santosh Talamantes MD  07/12/24 0936       Santosh Talamantes MD  07/12/24 1024

## 2024-07-12 NOTE — DISCHARGE INSTRUCTIONS
Call Dr. Montano today for follow-up appointment within 1 week.  Ice the left wrist for 20 minutes every 2-3 hours while awake for 2 to 3 days.  Elevate the wrist on a pillow.  Take the Ultram as needed as prescribed for pain.  Colace as needed as directed for constipation if you are taking Ultram for pain.  Wash your abrasions once a day with soap and water, pat dry, and apply bacitracin ointment for 3 days.  Call Dr. Jens Anglees today for follow-up appointment within 1 week.  Call your sleep specialist today for a follow-up appointment within 1 week.  Return to the emergency department if there is further falls, numbness, tingling, weakness, change in color or temperature of the left upper extremity, worse in any way at all.  Do not drive.  The patient should follow-up the Holter monitor results with Dr. Jens Angeles.

## 2024-07-12 NOTE — TELEPHONE ENCOUNTER
Caller: Denny Hairston    Relationship to patient: Self    Best call back number: [phone] (home)     Patient is needing: PATIENT SCHEDULED FOR CLOSED FRACTURE OF LEFT WRIST ON 7/19/24, WHICH WAS FIRST AVAILABLE. ED PN STATE PATIENT NEEDS TO BE SEEN IN A WEEK FROM ED VISIT, BUT WANT TO MAKE SURE [facility] DOESN'T WANT PATIENT TO COME IN SOONER AND ONE WEEK.   PLEASE ADVISE PATIENT IF APPT NEEDS PUSHED UP. THEY NEED LAGRANGE OFFICE.

## 2024-07-19 ENCOUNTER — OFFICE VISIT (OUTPATIENT)
Dept: ORTHOPEDIC SURGERY | Facility: CLINIC | Age: 54
End: 2024-07-19
Payer: COMMERCIAL

## 2024-07-19 VITALS
SYSTOLIC BLOOD PRESSURE: 132 MMHG | WEIGHT: 205 LBS | BODY MASS INDEX: 28.7 KG/M2 | HEIGHT: 71 IN | DIASTOLIC BLOOD PRESSURE: 93 MMHG

## 2024-07-19 DIAGNOSIS — S62.112A TRIQUETRAL CHIP FRACTURE, LEFT, CLOSED, INITIAL ENCOUNTER: Primary | ICD-10-CM

## 2024-07-19 PROCEDURE — 99204 OFFICE O/P NEW MOD 45 MIN: CPT | Performed by: INTERNAL MEDICINE

## 2024-07-19 NOTE — PROGRESS NOTES
"Subjective:     Patient ID: Denny Hairston is a 54 y.o. male.    Chief Complaint:    History of Present Illness  Denny Hairston presents to clinic today for evaluation of left wrist pain after a recent fall.  The patient states that he has been getting up in the [facility] due to sleep apnea and tripped falling onto his left wrist.  He had immediate pain and decreased motion.  The patient was seen and diagnosed with a triquetral fracture.  The patient was placed in a short arm removable splint till follow-up with orthopedics.  He denies any numbness or tingling in his hand.  He denies any pain elsewhere.     Social History     Occupational History     Employer: UNKNOWN EMPLOYER    Occupation: [id number]     Comment: [employer]   Tobacco Use    Smoking status: Every Day     Current packs/day: 0.25     Types: Cigarettes     Passive exposure: Current    Smokeless tobacco: Never   Vaping Use    Vaping status: Every Day    Substances: Nicotine, Flavoring    Devices: Pre-filled pod    Passive vaping exposure: Yes   Substance and Sexual Activity    Alcohol use: Not Currently    Drug use: Yes     Comment: fentanyl, heroin - last use 07/2023    Sexual activity: Yes      Past Medical History:   Diagnosis Date    Depression     Drug abuse     GERD (gastroesophageal reflux disease)     RENE (obstructive sleep apnea)     wears cpap     History reviewed. No pertinent surgical history.    Family History   Problem Relation Age of Onset    Cancer Mother     Cancer Father     Cirrhosis Father     Liver disease Sister     Cancer Brother                  Objective:  Vitals:    07/19/24 1623   BP: 132/93   Weight: 93 kg (205 lb)   Height: 180.3 cm (71\")         07/19/24  1623   Weight: 93 kg (205 lb)     Body mass index is 28.59 kg/m².        Left Hand Exam     Tenderness   The patient is experiencing tenderness in the palmar area, radial area and dorsal area.     Range of Motion   Wrist   Extension:  abnormal   Flexion:  " abnormal   Pronation:  abnormal   Supination:  abnormal     Muscle Strength   Wrist extension: 3/5   Wrist flexion: 3/5   :  3/5     Other   Erythema: absent  Scars: absent  Sensation: normal  Pulse: present               Imaging: 3 views of the left wrist were reviewed by myself in the office today  Indication: Wrist pain  Findings: X-rays demonstrate a mildly displaced dorsal triquetral fracture with soft tissue swelling.  No other osseous abnormality noted  Comparative studies: None    XR Wrist 3+ View Left    Result Date: 7/12/2024  Impression: Mildly displaced triquetral fracture with overlying soft tissue swelling. Electronically Signed: Leola Waters MD  7/12/2024 9:16 AM EDT  Workstation ID: [id number]    Assessment:        1. Triquetral chip fracture, left, closed, initial encounter           Plan:          Discussed treatment options at length with patient at today's visit.  I discussed with the patient that he has a mildly displaced dorsal triquetral fracture.  Recommend rest ice compression elevation and immobilization in a short arm removable wrist splint.  I like him to continue to work on thumb and finger flexion extension as well as forearm pronation and supination.  Recommend nonweightbearing.  I recommend continuation of tramadol 50 mg as needed for severe pain prescribed by the ER.  Follow up: 4 weeks with 3 views of the left wrist      Denny Hairston was in agreement with plan and had all questions answered.     Medications:  No orders of the defined types were placed in this encounter.      Followup:  No follow-ups on file.    Diagnoses and all orders for this visit:    1. Triquetral chip fracture, left, closed, initial encounter (Primary)          Dictated utilizing Dragon dictation

## 2024-07-30 ENCOUNTER — TELEPHONE (OUTPATIENT)
Dept: FAMILY MEDICINE CLINIC | Facility: CLINIC | Age: 54
End: 2024-07-30

## 2024-07-30 ENCOUNTER — OFFICE VISIT (OUTPATIENT)
Dept: GASTROENTEROLOGY | Facility: CLINIC | Age: 54
End: 2024-07-30
Payer: COMMERCIAL

## 2024-07-30 ENCOUNTER — OFFICE VISIT (OUTPATIENT)
Dept: FAMILY MEDICINE CLINIC | Facility: CLINIC | Age: 54
End: 2024-07-30
Payer: COMMERCIAL

## 2024-07-30 VITALS
HEIGHT: 71 IN | RESPIRATION RATE: 18 BRPM | OXYGEN SATURATION: 96 % | WEIGHT: 203 LBS | DIASTOLIC BLOOD PRESSURE: 90 MMHG | BODY MASS INDEX: 28.42 KG/M2 | HEART RATE: 56 BPM | SYSTOLIC BLOOD PRESSURE: 132 MMHG

## 2024-07-30 VITALS
BODY MASS INDEX: 28.61 KG/M2 | DIASTOLIC BLOOD PRESSURE: 80 MMHG | SYSTOLIC BLOOD PRESSURE: 116 MMHG | HEIGHT: 71 IN | WEIGHT: 204.4 LBS

## 2024-07-30 DIAGNOSIS — K21.9 GASTROESOPHAGEAL REFLUX DISEASE, UNSPECIFIED WHETHER ESOPHAGITIS PRESENT: Primary | ICD-10-CM

## 2024-07-30 DIAGNOSIS — F41.9 SEVERE ANXIETY: ICD-10-CM

## 2024-07-30 DIAGNOSIS — F51.01 PRIMARY INSOMNIA: ICD-10-CM

## 2024-07-30 DIAGNOSIS — D50.9 MICROCYTIC ANEMIA: ICD-10-CM

## 2024-07-30 DIAGNOSIS — D50.8 IRON DEFICIENCY ANEMIA SECONDARY TO INADEQUATE DIETARY IRON INTAKE: ICD-10-CM

## 2024-07-30 DIAGNOSIS — R73.03 PRE-DIABETES: Primary | ICD-10-CM

## 2024-07-30 DIAGNOSIS — R19.5 POSITIVE COLORECTAL CANCER SCREENING USING COLOGUARD TEST: ICD-10-CM

## 2024-07-30 PROBLEM — F14.10 COCAINE USE DISORDER: Status: ACTIVE | Noted: 2024-07-30

## 2024-07-30 PROCEDURE — 1160F RVW MEDS BY RX/DR IN RCRD: CPT | Performed by: STUDENT IN AN ORGANIZED HEALTH CARE EDUCATION/TRAINING PROGRAM

## 2024-07-30 PROCEDURE — 99214 OFFICE O/P EST MOD 30 MIN: CPT | Performed by: STUDENT IN AN ORGANIZED HEALTH CARE EDUCATION/TRAINING PROGRAM

## 2024-07-30 PROCEDURE — 1159F MED LIST DOCD IN RCRD: CPT | Performed by: STUDENT IN AN ORGANIZED HEALTH CARE EDUCATION/TRAINING PROGRAM

## 2024-07-30 RX ORDER — METHADONE HYDROCHLORIDE 5 MG/1
120 TABLET ORAL DAILY
Status: SHIPPED | COMMUNITY
Start: 2024-07-30 | End: 2024-07-30

## 2024-07-30 RX ORDER — BUSPIRONE HYDROCHLORIDE 5 MG/1
5 TABLET ORAL 3 TIMES DAILY
Qty: 90 TABLET | Refills: 2 | Status: SHIPPED | OUTPATIENT
Start: 2024-07-30

## 2024-07-30 RX ORDER — QUETIAPINE FUMARATE 50 MG/1
50 TABLET, FILM COATED ORAL NIGHTLY
Qty: 30 TABLET | Refills: 2 | Status: SHIPPED | OUTPATIENT
Start: 2024-07-30

## 2024-07-30 RX ORDER — METHADONE HYDROCHLORIDE 10 MG/1
120 TABLET ORAL DAILY
Status: SHIPPED | COMMUNITY
Start: 2024-07-30

## 2024-07-30 RX ORDER — VONOPRAZAN FUMARATE 26.72 MG/1
20 TABLET ORAL DAILY
Qty: 30 TABLET | Refills: 1 | Status: SHIPPED | OUTPATIENT
Start: 2024-07-30

## 2024-07-30 NOTE — PROGRESS NOTES
Venipuncture Blood Specimen Collection  Venipuncture performed in right arm by Trish Boyd MA with good hemostasis. Patient tolerated the procedure well without complications.   07/30/24   Trish Boyd MA

## 2024-07-30 NOTE — TELEPHONE ENCOUNTER
Caller: Lashanda Ramos       Best call back number:      What is your medical concern? STATES [facility] WAS SEEN TODAY BY DR BEYER   THEY SPOKE ABOUT AN IRON MEDICATION WAS JUST WONDERING IF IT WOULD BE CALLED IN FOR PATIENT       IF PRESCRIPTION IS CALLED IN PLEASE USE THE LISTED JORDANR

## 2024-07-30 NOTE — PROGRESS NOTES
Denny Hairston is a 54 y.o. 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   - Adherent to Omeprazole 40 mg QD but has to take OTC Omeprazole 20 mg typically in the evening to help control his symptoms.   - Denies dysphagia or odynophagia.   - No previous EGD noted.     # Positive Cologuard   - Noted positive cologuard in 10/2023.   - Denies family history of CRC.               Past Medical History:   Diagnosis Date    Depression     Drug abuse     GERD (gastroesophageal reflux disease)     RENE (obstructive sleep apnea)     wears cpap         Current Outpatient Medications:     busPIRone (BUSPAR) 5 MG tablet, Take 1 tablet by mouth 3 (Three) Times a Day., Disp: 90 tablet, Rfl: 2    FLUoxetine (PROzac) 20 MG capsule, Take 1 capsule by mouth Daily., Disp: 30 capsule, Rfl: 2    methadone (DOLOPHINE) 10 MG tablet, Take 12 tablets by mouth Daily,, Disp: , Rfl:     naloxone (NARCAN) 4 MG/0.1ML nasal spray, Call 911. Don't prime. [place] in 1 nostril for overdose. Repeat in 2-3 minutes in other nostril if no or minimal breathing/responsiveness., Disp: 2 each, Rfl: 0    QUEtiapine (SEROquel) 50 MG tablet, Take 1 tablet by mouth Every Night., Disp: 30 tablet, Rfl: 2    traMADol (Ultram) 50 MG tablet, Take 1 tablet by mouth Every 6 (Six) Hours As Needed for Moderate Pain., Disp: 30 tablet, Rfl: 0    traZODone (DESYREL) 100 MG tablet, Take 1 tablet by mouth Every Night., Disp: 30 tablet, Rfl: 5    Ventolin  (90 Base) MCG/ACT inhaler, INHALE 2 PUFFS EVERY 4 HOURS AS NEEDED FOR WHEEZING OR SHORTNESS OF AIR, Disp: 18 g, Rfl: 0    Vonoprazan Fumarate (Voquezna) 20 MG tablet, Take 1 tablet by mouth Daily., Disp: 30 tablet, Rfl: 1    No Known  Allergies    Social History     Socioeconomic History    Marital status: Single   Tobacco Use    Smoking status: Every Day     Current packs/day: 0.25     Types: Cigarettes     Passive exposure: Current    Smokeless tobacco: Never   Vaping Use    Vaping status: Every Day    Substances: Nicotine, Flavoring    Devices: Pre-filled pod    Passive vaping exposure: Yes   Substance and Sexual Activity    Alcohol use: Not Currently    Drug use: Yes     Comment: fentanyl, heroin - last use 07/2023    Sexual activity: Yes       Family History   Problem Relation Age of Onset    Cancer Mother     Cancer Father     Cirrhosis Father     Liver disease Sister     Cancer Brother        Objective     Vitals:    07/30/24 1330   BP: 116/80         07/30/24  1330   Weight: 92.7 kg (204 lb 6.4 oz)     Body mass index is 28.51 kg/m².    Physical Exam  Vitals reviewed.   Constitutional:       Appearance: Normal appearance.   HENT:      Head: Normocephalic and atraumatic.   Eyes:      Extraocular Movements: Extraocular movements intact.      Conjunctiva/sclera: Conjunctivae normal.   Cardiovascular:      Rate and Rhythm: Normal rate and regular rhythm.      Heart sounds: Normal heart sounds.   Pulmonary:      Effort: Pulmonary effort is normal.      Breath sounds: Normal breath sounds.   Abdominal:      General: Abdomen is flat. Bowel sounds are normal. There is no distension.      Palpations: Abdomen is soft.      Tenderness: There is no abdominal tenderness.   Neurological:      Mental Status: He is alert.   Psychiatric:         Mood and Affect: Mood normal.         Behavior: Behavior normal.         WBC   Date Value Ref Range Status   07/12/2024 7.95 3.40 - 10.80 10*3/mm3 Final     RBC   Date Value Ref Range Status   07/12/2024 4.32 4.14 - 5.80 10*6/mm3 Final     Hemoglobin   Date Value Ref Range Status   07/12/2024 9.7 (L) 13.0 - 17.7 g/dL Final     Hematocrit   Date Value Ref Range Status   07/12/2024 33.4 (L) 37.5 - 51.0 % Final      MCV   Date Value Ref Range Status   07/12/2024 77.3 (L) 79.0 - 97.0 fL Final     MCH   Date Value Ref Range Status   07/12/2024 22.5 (L) 26.6 - 33.0 pg Final     MCHC   Date Value Ref Range Status   07/12/2024 29.0 (L) 31.5 - 35.7 g/dL Final     RDW   Date Value Ref Range Status   07/12/2024 16.8 (H) 12.3 - 15.4 % Final     RDW-SD   Date Value Ref Range Status   07/12/2024 46.8 37.0 - 54.0 fl Final     MPV   Date Value Ref Range Status   07/12/2024 8.3 6.0 - 12.0 fL Final     Platelets   Date Value Ref Range Status   07/12/2024 211 140 - 450 10*3/mm3 Final     Neutrophil %   Date Value Ref Range Status   07/12/2024 66.3 42.7 - 76.0 % Final     Lymphocyte %   Date Value Ref Range Status   07/12/2024 22.3 19.6 - 45.3 % Final     Monocyte %   Date Value Ref Range Status   07/12/2024 8.7 5.0 - 12.0 % Final     Eosinophil %   Date Value Ref Range Status   07/12/2024 1.8 0.3 - 6.2 % Final     Basophil %   Date Value Ref Range Status   07/12/2024 0.5 0.0 - 1.5 % Final     Immature Grans %   Date Value Ref Range Status   07/12/2024 0.4 0.0 - 0.5 % Final     Neutrophils, Absolute   Date Value Ref Range Status   07/12/2024 5.28 1.70 - 7.00 10*3/mm3 Final     Lymphocytes, Absolute   Date Value Ref Range Status   07/12/2024 1.77 0.70 - 3.10 10*3/mm3 Final     Monocytes, Absolute   Date Value Ref Range Status   07/12/2024 0.69 0.10 - 0.90 10*3/mm3 Final     Eosinophils, Absolute   Date Value Ref Range Status   07/12/2024 0.14 0.00 - 0.40 10*3/mm3 Final     Basophils, Absolute   Date Value Ref Range Status   07/12/2024 0.04 0.00 - 0.20 10*3/mm3 Final     Immature Grans, Absolute   Date Value Ref Range Status   07/12/2024 0.03 0.00 - 0.05 10*3/mm3 Final     nRBC   Date Value Ref Range Status   07/12/2024 0.0 0.0 - 0.2 /100 WBC Final       Glucose   Date Value Ref Range Status   07/12/2024 93 65 - 99 mg/dL Final     Sodium   Date Value Ref Range Status   07/12/2024 138 136 - 145 mmol/L Final     Potassium   Date Value Ref Range  Status   07/12/2024 4.2 3.5 - 5.2 mmol/L Final     CO2   Date Value Ref Range Status   07/12/2024 30.2 (H) 22.0 - 29.0 mmol/L Final     Chloride   Date Value Ref Range Status   07/12/2024 99 98 - 107 mmol/L Final     Anion Gap   Date Value Ref Range Status   07/12/2024 8.8 5.0 - 15.0 mmol/L Final     Creatinine   Date Value Ref Range Status   07/12/2024 0.96 0.76 - 1.27 mg/dL Final     BUN   Date Value Ref Range Status   07/12/2024 13 6 - 20 mg/dL Final     BUN/Creatinine Ratio   Date Value Ref Range Status   07/12/2024 13.5 7.0 - 25.0 Final     Calcium   Date Value Ref Range Status   07/12/2024 9.1 8.6 - 10.5 mg/dL Final     Alkaline Phosphatase   Date Value Ref Range Status   07/12/2024 99 39 - 117 U/L Final     Total Protein   Date Value Ref Range Status   07/12/2024 6.6 6.0 - 8.5 g/dL Final     ALT (SGPT)   Date Value Ref Range Status   07/12/2024 29 1 - 41 U/L Final     AST (SGOT)   Date Value Ref Range Status   07/12/2024 47 (H) 1 - 40 U/L Final     Total Bilirubin   Date Value Ref Range Status   07/12/2024 0.3 0.0 - 1.2 mg/dL Final     Albumin   Date Value Ref Range Status   07/12/2024 4.1 3.5 - 5.2 g/dL Final     Globulin   Date Value Ref Range Status   07/12/2024 2.5 gm/dL Final         Imaging Results (Last 7 Days)       ** No results found for the last 168 hours. **                   Assessment & Plan     Diagnoses and all orders for this visit:    1. Gastroesophageal reflux disease, unspecified whether esophagitis present (Primary)  Assessment & Plan:  - Transition to Voquezna 20 mg QD   - Schedule EGD to further evaluate     Orders:  -     Vonoprazan Fumarate (Voquezna) 20 MG tablet; Take 1 tablet by mouth Daily.  Dispense: 30 tablet; Refill: 1  -     Case Request; Standing  -     Obtain Informed Consent; Standing  -     Follow Anesthesia Guidelines / Protocol; Standing  -     Case Request    2. Positive colorectal cancer screening using Cologuard test  Assessment & Plan:  - Schedule colonoscopy to  further evaluate     Orders:  -     Case Request; Standing  -     Obtain Informed Consent; Standing  -     Follow Anesthesia Guidelines / Protocol; Standing  -     Case Request      RTC in 4 months     I have discussed the above plan with the patient.  They verbalize understanding and are in agreement with the plan.  They have been advised to contact the office for any questions, concerns, or changes related to their health.

## 2024-07-31 ENCOUNTER — TELEPHONE (OUTPATIENT)
Dept: GASTROENTEROLOGY | Facility: CLINIC | Age: 54
End: 2024-07-31
Payer: COMMERCIAL

## 2024-07-31 LAB
FERRITIN SERPL-MCNC: 26 NG/ML (ref 30–400)
HBA1C MFR BLD: 6.2 % (ref 4.8–5.6)
IRON SATN MFR SERPL: 4 % (ref 15–55)
IRON SERPL-MCNC: 17 UG/DL (ref 38–169)
T3FREE SERPL-MCNC: 3.3 PG/ML (ref 2–4.4)
T4 FREE SERPL-MCNC: 1.23 NG/DL (ref 0.82–1.77)
TIBC SERPL-MCNC: 429 UG/DL (ref 250–450)
TSH SERPL DL<=0.005 MIU/L-ACNC: 1.03 UIU/ML (ref 0.45–4.5)
UIBC SERPL-MCNC: 412 UG/DL (ref 111–343)
VIT B12 SERPL-MCNC: 789 PG/ML (ref 232–1245)

## 2024-07-31 NOTE — TELEPHONE ENCOUNTER
Approved today by [place] [employer] 2017  The request has been approved. The authorization is effective from 07/31/2024 to 09/25/2024, as long as the member is enrolled in their current health plan. The request was reviewed and approved by a licensed clinical pharmacist. A written notification letter will follow with additional details.

## 2024-08-04 ENCOUNTER — HOSPITAL ENCOUNTER (EMERGENCY)
Facility: HOSPITAL | Age: 54
Discharge: HOME | End: 2024-08-04
Attending: STUDENT IN AN ORGANIZED HEALTH CARE EDUCATION/TRAINING PROGRAM | Admitting: STUDENT IN AN ORGANIZED HEALTH CARE EDUCATION/TRAINING PROGRAM
Payer: COMMERCIAL

## 2024-08-04 ENCOUNTER — APPOINTMENT (OUTPATIENT)
Dept: GENERAL RADIOLOGY | Facility: HOSPITAL | Age: 54
End: 2024-08-04
Payer: COMMERCIAL

## 2024-08-04 VITALS
HEART RATE: 88 BPM | WEIGHT: 200 LBS | SYSTOLIC BLOOD PRESSURE: 141 MMHG | HEIGHT: 69 IN | DIASTOLIC BLOOD PRESSURE: 89 MMHG | BODY MASS INDEX: 29.62 KG/M2 | TEMPERATURE: 99 F | OXYGEN SATURATION: 99 % | RESPIRATION RATE: 16 BRPM

## 2024-08-04 DIAGNOSIS — S46.911A RIGHT SHOULDER STRAIN, INITIAL ENCOUNTER: Primary | ICD-10-CM

## 2024-08-04 PROCEDURE — 99283 EMERGENCY DEPT VISIT LOW MDM: CPT

## 2024-08-04 PROCEDURE — 73030 X-RAY EXAM OF SHOULDER: CPT

## 2024-08-04 RX ORDER — NAPROXEN 250 MG/1
250 TABLET ORAL 2 TIMES DAILY PRN
Qty: 16 TABLET | Refills: 0 | Status: SHIPPED | OUTPATIENT
Start: 2024-08-04

## 2024-08-04 RX ADMIN — IBUPROFEN 600 MG: 200 TABLET, FILM COATED ORAL at 14:09

## 2024-08-04 NOTE — ED PROVIDER NOTES
Subjective   History of Present Illness  This is a 54-year-old who presents to the emergency department with right shoulder pain after he fell yesterday morning.  He is being evaluated and worked up on an outpatient basis for narcolepsy and the patient seems to have fallen hitting his right shoulder.  He hit the forehead but did not lose consciousness, he has had no seizures and takes no anticoagulants.  The patient is otherwise well-appearing and did not get seen yesterday for his fall.  He denies numbness, tingling, loss of sensation to the right upper extremity but does state that it is more painful to move and he has a hard time with movement of the shoulder joint secondary to pain.  He has no pain to the elbow, cervical spine.      Review of Systems   Constitutional:  Negative for activity change, chills, diaphoresis and fever.   HENT: Negative.     Respiratory: Negative.     Cardiovascular: Negative.    Skin: Negative.    Neurological: Negative.    All other systems reviewed and are negative.      Past Medical History:   Diagnosis Date    Depression     Drug abuse     GERD (gastroesophageal reflux disease)     RENE (obstructive sleep apnea)     wears cpap       No Known Allergies    History reviewed. No pertinent surgical history.    Family History   Problem Relation Age of Onset    Cancer Mother     Cancer Father     Cirrhosis Father     Liver disease Sister     Cancer Brother        Social History     Socioeconomic History    Marital status: Single   Tobacco Use    Smoking status: Every Day     Current packs/day: 0.25     Types: Cigarettes     Passive exposure: Current    Smokeless tobacco: Never   Vaping Use    Vaping status: Every Day    Substances: Nicotine, Flavoring    Devices: Pre-filled pod    Passive vaping exposure: Yes   Substance and Sexual Activity    Alcohol use: Not Currently    Drug use: Yes     Comment: fentanyl, heroin - last use 07/2023    Sexual activity: Yes           Objective   Physical  Exam  Vitals and nursing note reviewed.   Constitutional:       Appearance: Normal appearance. He is normal weight. He is not ill-appearing, toxic-appearing or diaphoretic.   HENT:      Head: Normocephalic and atraumatic.      Right Ear: External ear normal.      Left Ear: External ear normal.      Nose: Nose normal. No congestion or rhinorrhea.      Mouth/Throat:      Pharynx: No oropharyngeal exudate or posterior oropharyngeal erythema.   Eyes:      Extraocular Movements: Extraocular movements intact.      Conjunctiva/sclera: Conjunctivae normal.      Pupils: Pupils are equal, round, and reactive to light.   Cardiovascular:      Rate and Rhythm: Normal rate and regular rhythm.      Pulses: Normal pulses.   Pulmonary:      Effort: Pulmonary effort is normal.      Breath sounds: Normal breath sounds. No stridor. No wheezing, rhonchi or rales.   Chest:      Chest wall: No tenderness.   Abdominal:      General: There is no distension.      Palpations: Abdomen is soft. There is no mass.   Musculoskeletal:         General: No swelling, tenderness or signs of injury.      Cervical back: Normal range of motion. No rigidity or tenderness.      Comments: On evaluation of the right upper extremity about the shoulder and elbow, he has limited range of motion to the right upper extremity and active range of motion secondary to pain.  No crepitus palpated, the clavicle is within normal limits, there is no evidence of deformity to the proximal humerus or shoulder joint.  No cervical spine tenderness at the midline.  He does have moderate tenderness to palpation throughout the entirety of the shoulder joint.   Skin:     General: Skin is warm.      Capillary Refill: Capillary refill takes less than 2 seconds.      Coloration: Skin is not jaundiced or pale.      Findings: No bruising.   Neurological:      General: No focal deficit present.      Mental Status: He is alert and oriented to person, place, and time. Mental status is at  baseline.      Motor: No weakness.         Procedures           ED Course                                             Medical Decision Making  Splinting Procedure Note  Time:       Confirmed correct: Patient, procedure, side, site  Consent: Patient / legal guardian,     Indication: Shoulder sprain  Location: Right upper extremity    Pre procedure exam: Circulation motor and sensory intact  Post procedure exam: Circulation motor and sensory intact    Immobilization: Sling    Post splint application distal neurovascular exam normal    Patient tolerated: Well  Performed by: Ramesh Kovacs DO  Total time: 10 minutes    =======  MDM:    Escalation of care including admission/observation considered    - Discussions of management with other providers:  None    - Discussed/reviewed with Radiology regarding test interpretation    - Independent interpretation: XR    - Additional patient history obtained from: [facility]ette    - Review of external non-ED record (if available):  Prior Inpt record, Office record, Outpt record, Prior Outpt labs, PCP record, Outside ED record, Other    - Chronic conditions affecting care: See HPI and medical Hx.    - Social Determinants of health significantly affecting care:  None        Medical Decision Making Discussion:    Healthy and overall well-appearing 54-year-old presents to the emergency department with complaints of shoulder pain.  The patient did hit his head and the forehead yesterday when he fell yesterday morning but he has had no changes to behavior, altered mental status, seizure-like activity and does not meet any criteria for [place] CT rules.     After shared decision-making process with the patient and his family was held, no CT scan of the head will be obtained at this time.  He does however have shoulder pain to the right shoulder with some obvious tenderness to palpation.  X-ray does not show evidence of acute injury.  It is possible that he will require MRI on an outpatient  basis but this time, shoulder sling is given, the patient tolerated this well, he remains neurovascularly intact and is stable for discharge.    The patient has been given very strict return precautions to return to the emergency department should there be any acute change or worsening of their condition.  I have explained my findings and the patient has expressed understanding to me.  I explained that the work-up performed in the ED has been based on the specific complaint and concern, as the nature of emergency medicine is complaint driven and they understand that new symptoms may arise.  I have told them that, should there be any new symptoms, worsening or changing symptoms, a new work-up may be indicated that they are encouraged to return to the emergency department or promptly contact their primary care physician. We have employed a shared decision-making process as the discussion of their disposition.  The patient has been educated as to the nature of the visit, the tests and work-up performed and the findings from today's visit. At this time, there does not appear to be any acute emergent process that necessitates admission to the hospital, however, the patient understands that this can change unexpectedly. At this time, the patient is stable for discharge home and agrees to follow-up with her primary care physician in the next 24 to 48 hours or earlier should they be able to obtain an appointment.    The patient was counseled regarding diagnostic results and treatment plan and patient has indicated understanding of these instructions.      Problems Addressed:  Right shoulder strain, initial encounter: complicated acute illness or injury    Amount and/or Complexity of Data Reviewed  Radiology: ordered.    Risk  Prescription drug management.        Final diagnoses:   Right shoulder strain, initial encounter       ED Disposition  ED Disposition       ED Disposition   Discharge    Condition   Good    Comment    --               Jens Angeles, DO  [address] [facility] [address] [phone]               Medication List        New Prescriptions      naproxen 250 MG tablet  Commonly known as: NAPROSYN  Take 1 tablet by mouth 2 (Two) Times a Day As Needed (pain).               Where to Get Your Medications        These medications were sent to [facility] [id number] - [place], [place] - [address] [facility] [address] - [phone]  - [phone]   [address] [address], [facility] [address]      Phone: [phone]   naproxen 250 MG tablet            Ramesh Kovacs, DO  08/04/24 1437       Ramesh Kovacs, DO  08/04/24 1449

## 2024-08-04 NOTE — ED NOTES
Sitting quietly on gurney. Side rails up x2. Pt states is pain free at rest. Pain increases with movement.

## 2024-08-05 RX ORDER — FERROUS SULFATE 325(65) MG
325 TABLET ORAL
Qty: 30 TABLET | Refills: 2 | Status: SHIPPED | OUTPATIENT
Start: 2024-08-05

## 2024-08-06 ENCOUNTER — OFFICE VISIT (OUTPATIENT)
Dept: SLEEP MEDICINE | Facility: HOSPITAL | Age: 54
End: 2024-08-06
Payer: COMMERCIAL

## 2024-08-06 ENCOUNTER — DOCUMENTATION (OUTPATIENT)
Dept: SLEEP MEDICINE | Facility: HOSPITAL | Age: 54
End: 2024-08-06
Payer: COMMERCIAL

## 2024-08-06 VITALS
DIASTOLIC BLOOD PRESSURE: 65 MMHG | BODY MASS INDEX: 28.77 KG/M2 | WEIGHT: 201 LBS | HEIGHT: 70 IN | HEART RATE: 61 BPM | SYSTOLIC BLOOD PRESSURE: 100 MMHG | OXYGEN SATURATION: 95 %

## 2024-08-06 DIAGNOSIS — F51.02 ADJUSTMENT INSOMNIA: ICD-10-CM

## 2024-08-06 DIAGNOSIS — G47.33 OBSTRUCTIVE SLEEP APNEA: Primary | ICD-10-CM

## 2024-08-06 DIAGNOSIS — F17.200 TOBACCO DEPENDENCE SYNDROME: ICD-10-CM

## 2024-08-06 PROCEDURE — G0463 HOSPITAL OUTPT CLINIC VISIT: HCPCS

## 2024-08-06 NOTE — PROGRESS NOTES
SLEEP/PULMONARY  CLINIC NOTE      PATIENT IDENTIFICATION:  Name: Denny Hairston  Age: 54 y.o.  Sex: male  :  1970  MRN: [id number]    DATE OF CONSULTATION:  2024                     CHIEF COMPLAINT: Hypersomnia    History of Present Illness:   Denny Hairston is a 54 y.o. male patient with severe obstructive sleep apnea still very poor compliance with therapy for the BiPAP, he reporting to me that because of his anxiety and patient has a history of drug use and is still smoking, still sleepy tired during the day and has sometimes falls, he asked me about possible narcolepsy I informed the patient that we cannot say he has narcolepsy because he has severe sleep apnea and he is not compliant with his PAP machine      Review of Systems:   Constitutional: negative   Eyes: negative   ENT/oropharynx: negative   Cardiovascular: negative   Respiratory: negative   Gastrointestinal: negative   Genitourinary: negative   Neurological: negative   Musculoskeletal: negative   Integument/breast: negative   Endocrine: negative   Allergic/Immunologic: negative     Past Medical History:  Past Medical History:   Diagnosis Date    Depression     Drug abuse     GERD (gastroesophageal reflux disease)     RENE (obstructive sleep apnea)     wears cpap       Past Surgical History:  History reviewed. No pertinent surgical history.     Family History:  Family History   Problem Relation Age of Onset    Cancer Mother     Cancer Father     Cirrhosis Father     Liver disease Sister     Cancer Brother         Social History:   Social History     Tobacco Use    Smoking status: Every Day     Current packs/day: 0.25     Types: Cigarettes     Passive exposure: Current    Smokeless tobacco: Never   Substance Use Topics    Alcohol use: Not Currently        Allergies:  No Known Allergies    Home Meds:  (Not in a hospital admission)      Objective:    Vitals Ranges:   Heart Rate:  [61] 61  BP: (100)/(65) 100/65  Body mass index is 28.84 kg/m².      Exam:  General Appearance:  WDWN    HEENT:   without obvious abnormality,  Conjunctiva/corneas clear,  Normal external ear canals, no drainage    Clear orsalmucosa,  Mallampati score 3    Neck:  Supple, symmetrical, trachea midline. No JVD.  Lungs:   Bilateral basal rhonchi bilaterally, respirations unlabored symmetrical wall movement.    Chest wall:  No tenderness or deformity.    Heart:  Regular rate and rhythm, S1 and S2 normal.  Extremities: Trace edema no clubbing or Cyanosis        Data Review:  All labs (24hrs): No results found for this or any previous visit (from the past 24 hour(s)).     Imaging:  Holter Monitor - 72 Hour Up To 15 Days    A relatively benign monitor study.    Single episode of SVT, 4 beats duration, 160 bpm.       ASSESSMENT:  Diagnoses and all orders for this visit:    Obstructive sleep apnea    Tobacco dependence syndrome    Adjustment insomnia        PLAN:  Change to auto BiPAP maximum Bradish pressure 16 EPAP of 8 pressure support of 6, get nasal pillow mask    This patient with obstructive sleep apnea, compliance is improved. Encourage to use it more frequent, I re-emphasized on pt the long and short term benefit of treating RENE. The device is benefiting the patient.  The patient is also instructed to get the CPAP supplies from the [employer] and see me in 1 year for follow-up.    Set a bedtime that is early enough for you to get at least 7 hours of sleep.  Don’t go to bed unless you are sleepy.  If you don’t fall asleep after 20 minutes, get out of bed.  Establish a relaxing bedtime routine.  Use your bed only for sleep and sex.  Make your bedroom quiet and relaxing. Keep the room at a comfortable, cool temperature.  Limit exposure to bright light in the evenings.  Turn off electronic devices at least 30 minutes before bedtime.  Don’t eat a large meal before bedtime. If you are hungry at night, eat a light, healthy snack.  Exercise regularly and maintain a healthy diet.  Avoid  consuming caffeine in the late afternoon or evening.  Avoid consuming alcohol before bedtime.  Reduce your fluid intake before bedtime.  Avoid naps during the day time.      Education patient  About tips for  smoking cessation and risk factors and benefit, was to a discussion with the patient.     Follow-up 6    Nando Matos MD. D, ABSM.  8/6/2024  11:47 EDT

## 2024-08-06 NOTE — PROGRESS NOTES
Jens Angeles,   [facility]  [address]  [place] [address] [phone]    Subjective      Name Denny Hairston MRN [id number]    1970 AGE/SEX 54 y.o. / male      Chief Complaint Chief Complaint   Patient presents with    Hospital Follow Up Visit     Was seen in ED on . Has had a few syncope episodes since then          Visit History for  2024    Denny Hairston is a 54 y.o. male who presented today for Hospital Follow Up Visit (Was seen in ED on . Has had a few syncope episodes since then )     History of Present Illness  The patient presents for evaluation of multiple medical concerns. He is accompanied by an adult female.    He reports feeling generally well, but struggles with sleep, often feeling fatigued. The accompanying adult female notes that he frequently experiences anxiety, leading to frequent falls, at least five or more times daily. He sustained a wrist fracture due to a fall two nights ago, but is not currently wearing a sling. He is currently on Prozac for anxiety, but is uncertain of its effectiveness. He experienced side effects such as dizziness when discontinuing Prozac. He also reports auditory hallucinations, often nodding out and hearing himself. He has previously tried BuSpar and diazepam for anxiety. He is considering weaning off methadone, taking 120 mg daily in liquid form. He has been on this regimen for four years. He reports a lack of sex drive and has not slept for more than 45 minutes to an hour in months. He has tried melatonin 10 mg and trazodone, but discontinued them due to breathing difficulties. He was previously on Seroquel for a week in rehab.    The accompanying adult female suspects he may have narcolepsy, suspecting an underactive thyroid. She has observed signs of anemia, diabetes, and breathing difficulties. He denies using cocaine. He is scheduled to see a GI doctor today at 2:30 PM and is scheduled for a  "colonoscopy. He has noticed occasional bright red blood in his stool. He recalls that his iron levels were low when he donated plasma years ago.    FAMILY HISTORY  His maternal grandmother had thyroid issues.       Medications and Allergies   Current Outpatient Medications   Medication Instructions    busPIRone (BUSPAR) 5 mg, Oral, 3 Times Daily    FLUoxetine (PROZAC) 20 mg, Oral, Daily    methadone (DOLOPHINE) 120 mg, Oral, Daily    naloxone (NARCAN) 4 MG/0.1ML nasal spray Call 911. Don't prime. [place] in 1 nostril for overdose. Repeat in 2-3 minutes in other nostril if no or minimal breathing/responsiveness.    naproxen (NAPROSYN) 250 mg, Oral, 2 Times Daily PRN    QUEtiapine (SEROQUEL) 50 mg, Oral, Nightly    traMADol (ULTRAM) 50 mg, Oral, Every 6 Hours PRN    traZODone (DESYREL) 100 mg, Oral, Nightly    Ventolin  (90 Base) MCG/ACT inhaler 2 puffs, Inhalation, Every 4 Hours PRN    Voquezna 20 mg, Oral, Daily     No Known Allergies   I have reviewed the above medications and allergies     Objective:      Vitals Vitals:    07/30/24 1121   BP: 132/90   BP Location: Left arm   Patient Position: Sitting   Cuff Size: Adult   Pulse: 56   Resp: 18   SpO2: 96%   Weight: 92.1 kg (203 lb)   Height: 180.3 cm (71\")     Body mass index is 28.31 kg/m².    Physical Exam   Physical Exam       Results  Laboratory Studies  Hemoglobin was 10 in January and increased to 9 seven months later. Microcytic hypochromic in blood.     Assessment/Plan   Issues Addressed/ Plan   Diagnosis Plan   1. Pre-diabetes  Hemoglobin A1c      2. Severe anxiety  TSH    T4, free    T3, free    busPIRone (BUSPAR) 5 MG tablet    QUEtiapine (SEROquel) 50 MG tablet      3. Microcytic anemia  Iron Profile    Ferritin    Vitamin B12      4. Primary insomnia  QUEtiapine (SEROquel) 50 MG tablet         Assessment & Plan  1. Anxiety.  The dosage of Prozac will be increased. BuSpar will be prescribed, to be taken three times daily.    2. Insomnia.  Seroquel " 50 mg will be prescribed for sleep. He is advised to halve the tablet to observe its effectiveness. If it proves ineffective, the dosage can be increased to 100 mg. Trazodone should be discontinued.    3. Anemia.  Potential bleeding is suspected, as indicated by a positive Cologuard test. Iron levels will be checked to determine if an iron supplement is necessary. A referral to a gastroenterologist for a colonoscopy will be made.    4. Methadone use.  A tapering off methadone is recommended.    5. Health maintenance.  A1c and thyroid levels will be checked.           There are no Patient Instructions on file for this visit.   Follow up  recommended Return in about 1 month (around 8/30/2024).   - Dragon voice recognition software was utilized to complete this chart.  Every reasonable attempt was made to edit and correct the text, however some incorrect words may remain.   Jens Angeles DO    Patient or patient representative verbalized consent for the use of Ambient Listening during the visit with  Jens Angeles DO for chart documentation. 8/5/2024  22:06 EDT

## 2024-08-06 NOTE — TELEPHONE ENCOUNTER
Patient iron is extremely low.  Going to provide iron supplement, but unsure if insurance is going to cover.  Please let us know if he is unable to tolerate.

## 2024-08-06 NOTE — PROGRESS NOTES
Received pressure change from Dr. Matos.  Changes made in [facility]  08/06/2024, 09:45 AM    by Behzad Alexandre    Settings sent to device    Set Mode to VAuto  Set Min EPAP to 8.0 cmH2O  Set Max IPAP to 18.0 cmH2O  Set Pressure support to 6.0 cmH2O  Set Ramp enable to On  Set Ramp time to 20 min  Set Start EPAP to 5.0 cmH2O

## 2024-08-09 ENCOUNTER — TELEPHONE (OUTPATIENT)
Dept: FAMILY MEDICINE CLINIC | Facility: CLINIC | Age: 54
End: 2024-08-09
Payer: COMMERCIAL

## 2024-08-09 NOTE — TELEPHONE ENCOUNTER
Caller: Lashanda Ramos    Relationship: Emergency Contact    Best call back number: [phone]     What medication are you requesting: PAIN MEDICATION FOR FALL    What are your current symptoms: PAIN IN RIGHT ARM    How long have you been experiencing symptoms: 8/4/24    Have you had these symptoms before:    [x] Yes  [] No    Have you been treated for these symptoms before:   [x] Yes  [] No    If a prescription is needed, what is your preferred pharmacy and phone number:      [facility] [id number] - [place] - [address] - [phone]  - [phone] FX     Additional notes: PATIENT'S FRIEND CALLED IN TO REPORT THAT PATIENT IS STILL EXPERIENCING PAIN AND WOULD LIKE SOMETHING TO HELP THAT IS MORE EFFECTIVE THAN THE NAPROXEN.

## 2024-08-12 DIAGNOSIS — F41.9 SEVERE ANXIETY: ICD-10-CM

## 2024-08-12 RX ORDER — FLUOXETINE HYDROCHLORIDE 20 MG/1
20 CAPSULE ORAL DAILY
Qty: 30 CAPSULE | Refills: 0 | Status: SHIPPED | OUTPATIENT
Start: 2024-08-12

## 2024-08-13 ENCOUNTER — OFFICE VISIT (OUTPATIENT)
Dept: ORTHOPEDIC SURGERY | Facility: CLINIC | Age: 54
End: 2024-08-13
Payer: COMMERCIAL

## 2024-08-13 VITALS — WEIGHT: 201 LBS | HEIGHT: 70 IN | BODY MASS INDEX: 28.77 KG/M2

## 2024-08-13 DIAGNOSIS — S62.112A TRIQUETRAL CHIP FRACTURE, LEFT, CLOSED, INITIAL ENCOUNTER: Primary | ICD-10-CM

## 2024-08-13 DIAGNOSIS — S46.011A TRAUMATIC TEAR OF RIGHT ROTATOR CUFF, UNSPECIFIED TEAR EXTENT, INITIAL ENCOUNTER: ICD-10-CM

## 2024-08-13 PROCEDURE — 99213 OFFICE O/P EST LOW 20 MIN: CPT | Performed by: INTERNAL MEDICINE

## 2024-08-13 PROCEDURE — 73110 X-RAY EXAM OF WRIST: CPT | Performed by: INTERNAL MEDICINE

## 2024-08-13 NOTE — PROGRESS NOTES
"Subjective:     Patient ID: Denny Hairston is a 54 y.o. male.    Chief Complaint:    History of Present Illness  Denny Hairston returns to clinic today for evaluation of dorsal triquetral fracture.  The patient has subsequently fallen 2-3 more times and now jammed his left thumb and injured his right shoulder.  The patient was seen in the ER and had negative right shoulder x-rays.  The patient is now wearing the sling for his left arm on his right arm.  He requested pain medication today patient states he is a recovering addict and is currently in the methadone clinic receiving methadone.     Social History     Occupational History     Employer: UNKNOWN EMPLOYER    Occupation: [employer]     Comment: [employer]   Tobacco Use    Smoking status: Every Day     Current packs/day: 0.25     Types: Cigarettes     Passive exposure: Current    Smokeless tobacco: Never   Vaping Use    Vaping status: Every Day    Substances: Nicotine, Flavoring    Devices: Pre-filled pod    Passive vaping exposure: Yes   Substance and Sexual Activity    Alcohol use: Not Currently    Drug use: Yes     Comment: fentanyl, heroin - last use 07/2023    Sexual activity: Yes      Past Medical History:   Diagnosis Date    Depression     Drug abuse     GERD (gastroesophageal reflux disease)     RENE (obstructive sleep apnea)     wears cpap     No past surgical history on file.    Family History   Problem Relation Age of Onset    Cancer Mother     Cancer Father     Cirrhosis Father     Liver disease Sister     Cancer Brother                  Objective:  Vitals:    08/13/24 1403   Weight: 91.2 kg (201 lb)   Height: 177.8 cm (70\")         08/13/24  1403   Weight: 91.2 kg (201 lb)     Body mass index is 28.84 kg/m².        Left Hand Exam     Tenderness   The patient is experiencing tenderness in the snuff box (base of thumb).     Range of Motion   Wrist   Extension:  normal   Flexion:  normal   Pronation:  normal   Supination:  normal "     Muscle Strength   :  4/5     Other   Erythema: absent  Scars: absent  Sensation: normal  Pulse: present      Right Shoulder Exam     Tenderness   Right shoulder tenderness location: laterally.    Range of Motion   Active abduction:  abnormal   Passive abduction:  abnormal   Extension:  abnormal   External rotation:  abnormal   Forward flexion:  abnormal     Other   Erythema: absent  Scars: absent  Sensation: normal  Pulse: present             Imaging: 3 views of the left wrist were ordered and reviewed by myself in the office today  Indication: Wrist pain  Findings: X-rays demonstrate a mildly displaced dorsal triquetral fracture with soft tissue swelling.  No other osseous abnormality noted  Comparative studies: er xrays from 7/12  Imaging: 3 views of the right shoulder from the ER were reviewed by myself in the office today  Indication: Right shoulder pain  Findings: Negative x-rays  Comparative studies: None  XR Wrist 3+ View Left    Result Date: 8/13/2024  Ordering physician's impression is located in the Encounter Note dated 08/13/24.     XR Shoulder 2+ View Right    Result Date: 8/4/2024  Impression: No acute fracture or traumatic malalignment identified. Electronically Signed: Brady Murdock MD  8/4/2024 2:27 PM EDT  Workstation ID: [id number]    XR Wrist 3+ View Left    Result Date: 7/12/2024  Impression: Mildly displaced triquetral fracture with overlying soft tissue swelling. Electronically Signed: Leola Waters MD  7/12/2024 9:16 AM EDT  Workstation ID: [id number]    CT Head Without Contrast    Result Date: 7/12/2024  Impression: Negative for depressed skull fracture or acute intracranial hemorrhage in the setting of reported trauma. Electronically Signed: Devante Morales MD  7/12/2024 9:11 AM EDT  Workstation ID: [id number]       Assessment:        1. Triquetral chip fracture, left, closed, initial encounter           Plan:          Discussed treatment options at length with patient at today's  visit.  Discussed the patient that his dorsal triquetral fracture has healed.  There is no signs of new fracture at the base of his thumb.  He likely just jammed his left thumb.  I discussed with the patient the biggest issue at this point is his right shoulder.  I am worried about a massive rotator cuff tear as the patient has no active abduction internal rotation or forward flexion.  I recommend he continue to use a sling on his right upper extremity and take NSAIDs as needed for severe pain and swelling.  I will order an MRI of the right shoulder to evaluate the rotator cuff integrity.  Depending on what that shows I will likely refer the patient to my partner Dr. Rohan Shaikh for surgical consultation.  The patient voiced understanding and agreement with that plan.  Follow up: MORAIMA Hairston was in agreement with plan and had all questions answered.     Medications:  No orders of the defined types were placed in this encounter.      Followup:  No follow-ups on file.    Diagnoses and all orders for this visit:    1. Triquetral chip fracture, left, closed, initial encounter (Primary)  -     XR Wrist 3+ View Left          Dictated utilizing Dragon dictation

## 2024-08-21 ENCOUNTER — ANESTHESIA EVENT (OUTPATIENT)
Dept: PERIOP | Facility: HOSPITAL | Age: 54
End: 2024-08-21
Payer: COMMERCIAL

## 2024-08-22 ENCOUNTER — HOSPITAL ENCOUNTER (OUTPATIENT)
Facility: HOSPITAL | Age: 54
Setting detail: HOSPITAL OUTPATIENT SURGERY
Discharge: HOME | End: 2024-08-22
Attending: STUDENT IN AN ORGANIZED HEALTH CARE EDUCATION/TRAINING PROGRAM | Admitting: STUDENT IN AN ORGANIZED HEALTH CARE EDUCATION/TRAINING PROGRAM
Payer: COMMERCIAL

## 2024-08-22 ENCOUNTER — ANESTHESIA (OUTPATIENT)
Dept: PERIOP | Facility: HOSPITAL | Age: 54
End: 2024-08-22
Payer: COMMERCIAL

## 2024-08-22 VITALS
WEIGHT: 191.8 LBS | BODY MASS INDEX: 27.46 KG/M2 | DIASTOLIC BLOOD PRESSURE: 98 MMHG | TEMPERATURE: 98.2 F | SYSTOLIC BLOOD PRESSURE: 140 MMHG | OXYGEN SATURATION: 93 % | RESPIRATION RATE: 18 BRPM | HEIGHT: 70 IN | HEART RATE: 64 BPM

## 2024-08-22 DIAGNOSIS — K21.9 GASTROESOPHAGEAL REFLUX DISEASE, UNSPECIFIED WHETHER ESOPHAGITIS PRESENT: ICD-10-CM

## 2024-08-22 DIAGNOSIS — R19.5 POSITIVE COLORECTAL CANCER SCREENING USING COLOGUARD TEST: ICD-10-CM

## 2024-08-22 PROCEDURE — 25010000002 ONDANSETRON PER 1 MG: Performed by: NURSE ANESTHETIST, CERTIFIED REGISTERED

## 2024-08-22 PROCEDURE — 25010000002 PROPOFOL 200 MG/20ML EMULSION

## 2024-08-22 PROCEDURE — 25810000003 LACTATED RINGERS PER 1000 ML: Performed by: NURSE ANESTHETIST, CERTIFIED REGISTERED

## 2024-08-22 PROCEDURE — 45385 COLONOSCOPY W/LESION REMOVAL: CPT | Performed by: STUDENT IN AN ORGANIZED HEALTH CARE EDUCATION/TRAINING PROGRAM

## 2024-08-22 PROCEDURE — 25010000002 MIDAZOLAM PER 1MG: Performed by: NURSE ANESTHETIST, CERTIFIED REGISTERED

## 2024-08-22 PROCEDURE — 43239 EGD BIOPSY SINGLE/MULTIPLE: CPT | Performed by: STUDENT IN AN ORGANIZED HEALTH CARE EDUCATION/TRAINING PROGRAM

## 2024-08-22 PROCEDURE — 45380 COLONOSCOPY AND BIOPSY: CPT | Performed by: STUDENT IN AN ORGANIZED HEALTH CARE EDUCATION/TRAINING PROGRAM

## 2024-08-22 PROCEDURE — 88305 TISSUE EXAM BY PATHOLOGIST: CPT | Performed by: STUDENT IN AN ORGANIZED HEALTH CARE EDUCATION/TRAINING PROGRAM

## 2024-08-22 DEVICE — WORKING LENGTH 235CM, WORKING CHANNEL 2.8MM
Type: IMPLANTABLE DEVICE | Site: COLON | Status: FUNCTIONAL
Brand: RESOLUTION 360 CLIP

## 2024-08-22 RX ORDER — SODIUM CHLORIDE, SODIUM LACTATE, POTASSIUM CHLORIDE, CALCIUM CHLORIDE 600; 310; 30; 20 MG/100ML; MG/100ML; MG/100ML; MG/100ML
100 INJECTION, SOLUTION INTRAVENOUS ONCE
Status: DISCONTINUED | OUTPATIENT
Start: 2024-08-22 | End: 2024-08-22 | Stop reason: HOSPADM

## 2024-08-22 RX ORDER — PROPOFOL 10 MG/ML
INJECTION, EMULSION INTRAVENOUS AS NEEDED
Status: DISCONTINUED | OUTPATIENT
Start: 2024-08-22 | End: 2024-08-22 | Stop reason: SURG

## 2024-08-22 RX ORDER — ONDANSETRON 2 MG/ML
4 INJECTION INTRAMUSCULAR; INTRAVENOUS ONCE AS NEEDED
Status: COMPLETED | OUTPATIENT
Start: 2024-08-22 | End: 2024-08-22

## 2024-08-22 RX ORDER — SODIUM CHLORIDE, SODIUM LACTATE, POTASSIUM CHLORIDE, CALCIUM CHLORIDE 600; 310; 30; 20 MG/100ML; MG/100ML; MG/100ML; MG/100ML
9 INJECTION, SOLUTION INTRAVENOUS CONTINUOUS PRN
Status: DISCONTINUED | OUTPATIENT
Start: 2024-08-22 | End: 2024-08-22 | Stop reason: HOSPADM

## 2024-08-22 RX ORDER — DEXMEDETOMIDINE HYDROCHLORIDE 100 UG/ML
INJECTION, SOLUTION INTRAVENOUS AS NEEDED
Status: DISCONTINUED | OUTPATIENT
Start: 2024-08-22 | End: 2024-08-22 | Stop reason: SURG

## 2024-08-22 RX ORDER — SODIUM CHLORIDE 0.9 % (FLUSH) 0.9 %
10 SYRINGE (ML) INJECTION AS NEEDED
Status: DISCONTINUED | OUTPATIENT
Start: 2024-08-22 | End: 2024-08-22 | Stop reason: HOSPADM

## 2024-08-22 RX ORDER — MIDAZOLAM HYDROCHLORIDE 2 MG/2ML
1 INJECTION, SOLUTION INTRAMUSCULAR; INTRAVENOUS
Status: DISCONTINUED | OUTPATIENT
Start: 2024-08-22 | End: 2024-08-22 | Stop reason: HOSPADM

## 2024-08-22 RX ORDER — LIDOCAINE HYDROCHLORIDE 10 MG/ML
0.5 INJECTION, SOLUTION INFILTRATION; PERINEURAL ONCE AS NEEDED
Status: DISCONTINUED | OUTPATIENT
Start: 2024-08-22 | End: 2024-08-22 | Stop reason: HOSPADM

## 2024-08-22 RX ORDER — LIDOCAINE HYDROCHLORIDE 20 MG/ML
INJECTION, SOLUTION INFILTRATION; PERINEURAL AS NEEDED
Status: DISCONTINUED | OUTPATIENT
Start: 2024-08-22 | End: 2024-08-22 | Stop reason: SURG

## 2024-08-22 RX ORDER — ONDANSETRON 2 MG/ML
4 INJECTION INTRAMUSCULAR; INTRAVENOUS ONCE AS NEEDED
Status: DISCONTINUED | OUTPATIENT
Start: 2024-08-22 | End: 2024-08-22 | Stop reason: HOSPADM

## 2024-08-22 RX ORDER — SODIUM CHLORIDE 0.9 % (FLUSH) 0.9 %
10 SYRINGE (ML) INJECTION EVERY 12 HOURS SCHEDULED
Status: DISCONTINUED | OUTPATIENT
Start: 2024-08-22 | End: 2024-08-22 | Stop reason: HOSPADM

## 2024-08-22 RX ADMIN — MIDAZOLAM HYDROCHLORIDE 1 MG: 1 INJECTION, SOLUTION INTRAMUSCULAR; INTRAVENOUS at 09:22

## 2024-08-22 RX ADMIN — PROPOFOL 180 MG: 10 INJECTION, EMULSION INTRAVENOUS at 10:41

## 2024-08-22 RX ADMIN — LIDOCAINE HYDROCHLORIDE 50 MG: 20 INJECTION, SOLUTION INFILTRATION; PERINEURAL at 10:30

## 2024-08-22 RX ADMIN — SODIUM CHLORIDE, POTASSIUM CHLORIDE, SODIUM LACTATE AND CALCIUM CHLORIDE 9 ML/HR: 600; 310; 30; 20 INJECTION, SOLUTION INTRAVENOUS at 09:23

## 2024-08-22 RX ADMIN — PROPOFOL 100 MG: 10 INJECTION, EMULSION INTRAVENOUS at 10:30

## 2024-08-22 RX ADMIN — LIDOCAINE HYDROCHLORIDE 50 MG: 20 INJECTION, SOLUTION INFILTRATION; PERINEURAL at 10:28

## 2024-08-22 RX ADMIN — ONDANSETRON 4 MG: 2 INJECTION INTRAMUSCULAR; INTRAVENOUS at 09:22

## 2024-08-22 RX ADMIN — PROPOFOL 70 MG: 10 INJECTION, EMULSION INTRAVENOUS at 10:28

## 2024-08-22 RX ADMIN — DEXMEDETOMIDINE HYDROCHLORIDE 8 MCG: 100 INJECTION, SOLUTION, CONCENTRATE INTRAVENOUS at 10:25

## 2024-08-22 NOTE — ANESTHESIA PREPROCEDURE EVALUATION
Anesthesia Evaluation     Patient summary reviewed   no history of anesthetic complications:   NPO Solid Status: > 8 hours  NPO Liquid Status: > 8 hours           Airway   Mallampati: II  TM distance: >3 FB  Neck ROM: full  No difficulty expected  Dental - normal exam     Pulmonary - normal exam    breath sounds clear to auscultation  (+) a smoker Current, Smoked day of surgery, vape,sleep apnea on CPAP  Cardiovascular - normal exam  Exercise tolerance: good (4-7 METS)    ECG reviewed  Rhythm: regular  Rate: normal        Neuro/Psych  (+) psychiatric history Depression and Anxiety  GI/Hepatic/Renal/Endo    (+) obesity, GERD well controlled    Musculoskeletal     Abdominal    Substance History      OB/GYN negative ob/gyn ROS         Other                          Anesthesia Plan    ASA 2     MAC     intravenous induction     Anesthetic plan, risks, benefits, and alternatives have been provided, discussed and informed consent has been obtained with: patient.  Pre-procedure education provided  Use of blood products discussed with patient  Consented to blood products.        CODE STATUS:

## 2024-08-22 NOTE — ANESTHESIA POSTPROCEDURE EVALUATION
Patient: Denny Hairston    Procedure Summary       Date: 08/22/24 Room / Location: [facility] ENDOSCOPY 2 /  [facility]    Anesthesia Start: 1022 Anesthesia Stop: 1118    Procedures:       ESOPHAGOGASTRODUODENOSCOPY WITH BIOPSY (Esophagus)      COLONOSCOPY WITH POLYPECTOMY Diagnosis:       Gastroesophageal reflux disease, unspecified whether esophagitis present      Positive colorectal cancer screening using Cologuard test      (Gastroesophageal reflux disease, unspecified whether esophagitis present [K21.9])      (Positive colorectal cancer screening using Cologuard test [R19.5])    Surgeons: Rainer Doty MD Provider: Dominguez Cruz CRNA    Anesthesia Type: MAC ASA Status: 2            Anesthesia Type: MAC    Vitals  Vitals Value Taken Time   /87 08/22/24 1140   Temp     Pulse 64 08/22/24 1147   Resp 17 08/22/24 1140   SpO2 71 % 08/22/24 1147   Vitals shown include unfiled device data.        Post Anesthesia Care and Evaluation    Patient location during evaluation: bedside  Patient participation: complete - patient participated  Level of consciousness: awake and alert  Pain score: 0  Pain management: adequate    Airway patency: patent  Anesthetic complications: No anesthetic complications  PONV Status: none  Cardiovascular status: acceptable  Respiratory status: acceptable  Hydration status: acceptable  No anesthesia care post op

## 2024-08-22 NOTE — H&P
Patient Care Team:  Jens Angeles DO as PCP - General (Family Medicine)    CHIEF COMPLAINT:  GERD and positive cologuard    HISTORY OF PRESENT ILLNESS:  EGD for GERD.   C/s for positive cologuard.     Past Medical History:   Diagnosis Date    Depression     Drug abuse     GERD (gastroesophageal reflux disease)     RENE (obstructive sleep apnea)     wears cpap     History reviewed. No pertinent surgical history.  Family History   Problem Relation Age of Onset    Cancer Mother     Cancer Father     Cirrhosis Father     Liver disease Sister     Cancer Brother      Social History     Tobacco Use    Smoking status: Former     Current packs/day: 0.25     Types: Cigarettes     Passive exposure: Current    Smokeless tobacco: Former   Vaping Use    Vaping status: Every Day    Substances: Nicotine, Flavoring    Devices: Pre-filled pod    Passive vaping exposure: Yes   Substance Use Topics    Alcohol use: Not Currently    Drug use: Yes     Comment: fentanyl, heroin - last use 07/2023     Medications Prior to Admission   Medication Sig Dispense Refill Last Dose    busPIRone (BUSPAR) 5 MG tablet Take 1 tablet by mouth 3 (Three) Times a Day. 90 tablet 2 8/21/2024 at 2200    ferrous sulfate 325 (65 FE) MG tablet Take 1 tablet by mouth Daily With Breakfast. 30 tablet 2 8/20/2024    FLUoxetine (PROzac) 20 MG capsule TAKE 1 CAPSULE BY MOUTH DAILY 30 capsule 0 8/21/2024 at 0900    methadone (DOLOPHINE) 10 MG tablet Take 12 tablets by mouth Daily,   8/22/2024 at 0600    QUEtiapine (SEROquel) 50 MG tablet Take 1 tablet by mouth Every Night. 30 tablet 2 8/21/2024 at 2200    Vonoprazan Fumarate (Voquezna) 20 MG tablet Take 1 tablet by mouth Daily. 30 tablet 1 8/21/2024 at 0900    naloxone (NARCAN) 4 MG/0.1ML nasal spray Call 911. Don't prime. [place] in 1 nostril for overdose. Repeat in 2-3 minutes in other nostril if no or minimal breathing/responsiveness. 2 each 0      Allergies:  Patient has no known allergies.    REVIEW OF SYSTEMS:   "Please see the above history of present illness for pertinent positives and negatives.  The remainder of the patient's systems have been reviewed and are negative.     Vital Signs  Temp:  [98.2 °F (36.8 °C)] 98.2 °F (36.8 °C)  Heart Rate:  [62] 62  Resp:  [10] 10  BP: (115)/(77) 115/77    Flowsheet Rows      Flowsheet Row First Filed Value   Admission Height 177.8 cm (70\") Documented at 08/21/2024 1242   Admission Weight 90.7 kg (200 lb) Documented at 08/21/2024 1242             Physical Exam:  Physical Exam   Constitutional: Patient appears well-developed and well-nourished and in no acute distress   HEENT:   Head: Normocephalic and atraumatic.   Eyes:  Pupils are equal, round, and reactive to light. EOM are intact. Sclerae are anicteric and non-injected.  Mouth and Throat: Patient has moist mucous membranes. Oropharynx is clear of any erythema or exudate.     Neck: Neck supple. No JVD present. No thyromegaly present. No lymphadenopathy present.  Cardiovascular: Regular rate, regular rhythm, S1 normal and S2 normal.  Exam reveals no gallop and no friction rub.  No murmur heard.  Pulmonary/Chest: Lungs are clear to auscultation bilaterally. No respiratory distress. No wheezes. No rhonchi. No rales.   Abdominal: Soft. Bowel sounds are normal. No distension and no mass. There is no hepatosplenomegaly. There is no tenderness.   Musculoskeletal: Normal Muscle tone  Extremities: No edema. Pulses are palpable in all 4 extremities.  Neurological: Patient is alert and oriented to person, place, and time. Cranial nerves II-XII are grossly intact with no focal deficits.  Skin: Skin is warm. No rash noted. Nails show no clubbing.  No cyanosis or erythema.    Debilities/Disabilities Identified: None  Emotional Behavior: Appropriate     Results Review:   I reviewed the patient's new clinical results.    Lab Results (most recent)       None            Imaging Results (Most Recent)       None          reviewed    ECG/EMG Results " (most recent)       None          reviewed    Assessment & Plan   GERD & positive cologuard /  EGD and colonoscopy      I discussed the patient's findings and my recommendations with patient.     Rainer Doty MD  08/22/24  10:25 EDT    Time: 10 min prior to procedure.

## 2024-08-26 LAB
CYTO UR: NORMAL
LAB AP CASE REPORT: NORMAL
PATH REPORT.FINAL DX SPEC: NORMAL
PATH REPORT.GROSS SPEC: NORMAL

## 2024-08-29 ENCOUNTER — HOSPITAL ENCOUNTER (OUTPATIENT)
Dept: MRI IMAGING | Facility: HOSPITAL | Age: 54
Discharge: HOME | End: 2024-08-29
Admitting: INTERNAL MEDICINE
Payer: COMMERCIAL

## 2024-08-29 DIAGNOSIS — S46.011A TRAUMATIC TEAR OF RIGHT ROTATOR CUFF, UNSPECIFIED TEAR EXTENT, INITIAL ENCOUNTER: ICD-10-CM

## 2024-08-29 PROCEDURE — 73221 MRI JOINT UPR EXTREM W/O DYE: CPT

## 2024-08-30 NOTE — PROGRESS NOTES
- EGD for GERD.  - Findings/path: medium sized HH, gastritis (biopsied -- negative for H Pylori, rare IM)  - POC:  No esophagitis seen. Continue Voquezna for GERD and mild gastritis seen. No further workup for intestinal metaplasia since H Pylori was negative.     - C/s for positive cologuard   - Findings/path: 5 sub-cm tubular adenomas removed, a sub-cm TVA removed   - POC: Repeat colonoscopy in 1 year with Plenvu prep for surveillance given fair prep

## 2024-09-04 ENCOUNTER — TELEPHONE (OUTPATIENT)
Dept: ORTHOPEDIC SURGERY | Facility: CLINIC | Age: 54
End: 2024-09-04

## 2024-09-04 ENCOUNTER — OFFICE VISIT (OUTPATIENT)
Dept: FAMILY MEDICINE CLINIC | Facility: CLINIC | Age: 54
End: 2024-09-04
Payer: COMMERCIAL

## 2024-09-04 VITALS
HEIGHT: 70 IN | HEART RATE: 72 BPM | WEIGHT: 201 LBS | BODY MASS INDEX: 28.77 KG/M2 | DIASTOLIC BLOOD PRESSURE: 62 MMHG | OXYGEN SATURATION: 96 % | SYSTOLIC BLOOD PRESSURE: 106 MMHG

## 2024-09-04 DIAGNOSIS — M75.101 TEAR OF RIGHT ROTATOR CUFF, UNSPECIFIED TEAR EXTENT, UNSPECIFIED WHETHER TRAUMATIC: Primary | ICD-10-CM

## 2024-09-04 DIAGNOSIS — K21.9 GASTROESOPHAGEAL REFLUX DISEASE, UNSPECIFIED WHETHER ESOPHAGITIS PRESENT: ICD-10-CM

## 2024-09-04 DIAGNOSIS — G47.33 OBSTRUCTIVE SLEEP APNEA: ICD-10-CM

## 2024-09-04 PROCEDURE — 99214 OFFICE O/P EST MOD 30 MIN: CPT | Performed by: STUDENT IN AN ORGANIZED HEALTH CARE EDUCATION/TRAINING PROGRAM

## 2024-09-04 NOTE — TELEPHONE ENCOUNTER
Caller: Lashanda Ramos    Relationship to patient: Emergency Contact    Best call back number:   [phone]    Chief complaint: RT SHOULDER     Type of visit: FUP OF MRI    Requested date: ASAP     If rescheduling, when is the original appointment: MRI WAS DONE [id number]     Additional notes:DR LINARES ORDERED MRI FOR PATIENT AND ADVISED HIM TO FOLLOW UP WITH DR DUVAL

## 2024-09-05 ENCOUNTER — OFFICE VISIT (OUTPATIENT)
Dept: ORTHOPEDIC SURGERY | Facility: CLINIC | Age: 54
End: 2024-09-05
Payer: COMMERCIAL

## 2024-09-05 VITALS
WEIGHT: 201 LBS | DIASTOLIC BLOOD PRESSURE: 72 MMHG | SYSTOLIC BLOOD PRESSURE: 92 MMHG | BODY MASS INDEX: 28.77 KG/M2 | HEIGHT: 70 IN

## 2024-09-05 DIAGNOSIS — S46.011A TRAUMATIC TEAR OF RIGHT ROTATOR CUFF, UNSPECIFIED TEAR EXTENT, INITIAL ENCOUNTER: Primary | ICD-10-CM

## 2024-09-05 DIAGNOSIS — M75.51 SUBACROMIAL BURSITIS OF RIGHT SHOULDER JOINT: ICD-10-CM

## 2024-09-05 RX ORDER — NAPROXEN 500 MG/1
500 TABLET ORAL 2 TIMES DAILY WITH MEALS
Qty: 60 TABLET | Refills: 0 | Status: SHIPPED | OUTPATIENT
Start: 2024-09-05

## 2024-09-05 NOTE — PROGRESS NOTES
Subjective:     Patient ID: Denny Hairston is a 54 y.o. male.    Chief Complaint:  Right shoulder pain, new patient to examiner    History of Present Illness  History of Present Illness  The patient presents to the clinic today for evaluation of right shoulder pain and limited function.    He reports that his symptoms began abruptly approximately 2 weeks ago following a fall. He has been experiencing sleep disturbances and balance issues, which led to the fall where he landed on his outstretched right arm. This incident resulted in immediate pain and a significant reduction in the functionality of his right arm and shoulder.    He mentions previous falls in the past 6 months that caused mild pain, but the recent fall has led to a substantial increase in discomfort. He describes the current pain as moderate to severe, rating it between 7 and 9 out of 10. The pain is characterized as throbbing, stabbing, shooting, aching, and burning, accompanied by stiffness and a feeling of weakness in the shoulder, especially when attempting overhead movements.    His pain and functional limitations are worsened by work, driving, and leisure activities, and show minimal improvement with rest or over-the-counter anti-inflammatory medications like ibuprofen. He was previously seen by Dr. Montano who ordered an MRI, and he is here today for further evaluation and discussion regarding his shoulder injury.    He reports no radiation of pain from the shoulder and, being right-hand dominant, also reports no associated numbness or tingling.     Social History     Occupational History     Employer: UNKNOWN EMPLOYER    Occupation: codebender     Comment: [employer]   Tobacco Use    Smoking status: Former     Current packs/day: 0.25     Types: Cigarettes     Passive exposure: Current    Smokeless tobacco: Former   Vaping Use    Vaping status: Every Day    Substances: Nicotine, Flavoring    Devices: Pre-filled pod    Passive  "vaping exposure: Yes   Substance and Sexual Activity    Alcohol use: Not Currently    Drug use: Yes     Comment: fentanyl, heroin - last use 07/2023    Sexual activity: Defer      Past Medical History:   Diagnosis Date    Depression     Drug abuse     GERD (gastroesophageal reflux disease)     RENE (obstructive sleep apnea)     wears cpap     Past Surgical History:   Procedure Laterality Date    COLONOSCOPY W/ POLYPECTOMY N/A 8/22/2024    Procedure: COLONOSCOPY WITH POLYPECTOMY;  Surgeon: Rainer Doty MD;  Location: [facility];  Service: Gastroenterology;  Laterality: N/A;  diverticulosis, ascending polyp x2, transverse polyp x2, descending polyp, sigmoid polyp    ENDOSCOPY N/A 8/22/2024    Procedure: ESOPHAGOGASTRODUODENOSCOPY WITH BIOPSY;  Surgeon: Raienr Doty MD;  Location: [facility];  Service: Gastroenterology;  Laterality: N/A;  gastritis       Family History   Problem Relation Age of Onset    Cancer Mother     Cancer Father     Cirrhosis Father     Liver disease Sister     Cancer Brother          Review of Systems        Objective:  Vitals:    09/05/24 0904   BP: 92/72   Weight: 91.2 kg (201 lb)   Height: 177.8 cm (70\")         09/05/24  0904   Weight: 91.2 kg (201 lb)     Body mass index is 28.84 kg/m².  Physical Exam    Vital signs reviewed.   General: No acute distress, alert and oriented  Eyes: conjunctiva clear; pupils equally round and reactive  ENT: external ears and nose atraumatic; oropharynx clear  CV: no peripheral edema  Resp: normal respiratory effort  Skin: no rashes or wounds; normal turgor  Psych: mood and affect appropriate; recent and remote memory intact          Physical Exam  Right shoulder exhibits active forward flexion of 60 degrees with strength rated at 3 out of 5, and passive forward flexion of 90 degrees. Active external rotation is 20 degrees, passive is 45 degrees, with strength rated at 4- out of 5 and 4+ out of 5 on belly press test. Negative bear hug sign observed. " Positive drop arm test, negative external rotation lag sign. Positive empty can test, positive Hinds, Neer's, positive Yergason, Speed's, Gillespie's. No tenderness over AC joint with negative cross arm test. Positive deltoid firing all three components. Brisk cap refill to all digits, 1+ radial pulse in right wrist. Moderate soft tissue swelling from the subacromial space noted. No overlying skin changes.         Imaging:    MRI Shoulder Right Without Contrast    Result Date: 8/30/2024  Impression: 1.Large full-thickness tear involving the entirety of the supraspinatus component of the rotator cuff. 2.No evidence of biceps tendon tear or distal retraction. There may be mild tendinopathy of the proximal biceps tendon. 3.Evidence for a SLAP type VIII tear. 4.A large heterogeneous subacromial/subdeltoid bursal collection is noted. The findings suggest changes of overlying bursitis. There may be a component of intrabursal hemorrhage as well. Surrounding soft tissue edema is noted. 5.Mild osteoarthritis of the glenohumeral joint. Mild age-related changes of the acromioclavicular joint. Electronically Signed: Alvino Gutierrez MD  8/30/2024 1:47 PM EDT  Workstation ID: [id number]    XR Shoulder 2+ View Right    Result Date: 8/4/2024  Impression: No acute fracture or traumatic malalignment identified. Electronically Signed: Brady Murdock MD  8/4/2024 2:27 PM EDT  Workstation ID: [id number]    Review of outside x-rays as well as MRI right shoulder-reviewed imaging as well as radiology report indicates large full-thickness rotator cuff tear of the supraspinatus with associated SLAP tear noted and associated subacromial bursitis.  Mild degenerative change of the glenohumeral joint.  Sagittal imaging reveals approximately 25 to 30% atrophy of the supraspinatus.  Large complex subacromial fluid collection noted may be consistent with hemorrhage.    Assessment:        1. Traumatic tear of right rotator cuff, unspecified tear  extent, initial encounter    2. Subacromial bursitis of right shoulder joint           Plan:          Assessment & Plan  1. Right shoulder pain.  The patient presents with moderate to severe right shoulder pain, rated 7-9 out of 10, following a fall two weeks ago. The pain is throbbing, stabbing, shooting, aching, and burning in nature, with associated stiffness and significant weakness, particularly with overhead motion. The pain is exacerbated by working, driving, and leisure activities, with minimal improvement from rest and over-the-counter anti-inflammatory medications. The clinical exam reveals limited range of motion and strength, positive drop arm test, and positive Hinds, Neer's, Yergason, Speed's, and Hopewell's tests. MRI findings indicate a large rotator cuff tear with some evidence of atrophy, suggesting an acute on chronic issue.     Discussed the findings and the option for surgical treatment with rotator cuff repair. He is advised to discontinue nicotine use before considering surgery and is currently working on a vaping cessation program.     A referral has been placed to the sports medicine office for right shoulder aspiration under ultrasound, given the significant amount of fluid on the MRI.     Naproxen 500 mg has been prescribed to help with pain in the interim.    Follow-up  He will follow up in 4 weeks for reassessment of pain and function, and a nicotine screen will be obtained at that time to potentially proceed with surgical treatment.    Denny Hairston was in agreement with plan and had all questions answered.     Orders:  Orders Placed This Encounter   Procedures    Ambulatory Referral to Sports Medicine       Medications:  New Medications Ordered This Visit   Medications    naproxen (NAPROSYN) 500 MG tablet     Sig: Take 1 tablet by mouth 2 (Two) Times a Day With Meals.     Dispense:  60 tablet     Refill:  0       Followup:  No follow-ups on file.    Diagnoses and all orders for this  visit:    1. Traumatic tear of right rotator cuff, unspecified tear extent, initial encounter (Primary)  -     Ambulatory Referral to Sports Medicine    2. Subacromial bursitis of right shoulder joint  -     Ambulatory Referral to Sports Medicine    Other orders  -     naproxen (NAPROSYN) 500 MG tablet; Take 1 tablet by mouth 2 (Two) Times a Day With Meals.  Dispense: 60 tablet; Refill: 0                  Dictated utilizing Dragon dictation     Patient or patient representative verbalized consent for the use of Ambient Listening during the visit with  Rohan Shaikh MD for chart documentation. 9/6/2024  09:14 EDT

## 2024-09-13 DIAGNOSIS — F41.9 SEVERE ANXIETY: ICD-10-CM

## 2024-09-17 ENCOUNTER — OFFICE VISIT (OUTPATIENT)
Dept: SPORTS MEDICINE | Facility: CLINIC | Age: 54
End: 2024-09-17
Payer: COMMERCIAL

## 2024-09-17 ENCOUNTER — OFFICE VISIT (OUTPATIENT)
Dept: SLEEP MEDICINE | Facility: HOSPITAL | Age: 54
End: 2024-09-17
Payer: COMMERCIAL

## 2024-09-17 VITALS
HEIGHT: 70 IN | HEART RATE: 61 BPM | BODY MASS INDEX: 29.49 KG/M2 | SYSTOLIC BLOOD PRESSURE: 111 MMHG | DIASTOLIC BLOOD PRESSURE: 72 MMHG | OXYGEN SATURATION: 93 % | WEIGHT: 206 LBS

## 2024-09-17 VITALS
HEART RATE: 68 BPM | WEIGHT: 206 LBS | HEIGHT: 70 IN | DIASTOLIC BLOOD PRESSURE: 60 MMHG | SYSTOLIC BLOOD PRESSURE: 128 MMHG | OXYGEN SATURATION: 96 % | BODY MASS INDEX: 29.49 KG/M2 | RESPIRATION RATE: 16 BRPM

## 2024-09-17 DIAGNOSIS — K21.9 GASTROESOPHAGEAL REFLUX DISEASE, UNSPECIFIED WHETHER ESOPHAGITIS PRESENT: ICD-10-CM

## 2024-09-17 DIAGNOSIS — G47.33 OBSTRUCTIVE SLEEP APNEA: Primary | ICD-10-CM

## 2024-09-17 DIAGNOSIS — F17.200 TOBACCO DEPENDENCE SYNDROME: ICD-10-CM

## 2024-09-17 DIAGNOSIS — F51.01 PRIMARY INSOMNIA: ICD-10-CM

## 2024-09-17 DIAGNOSIS — S46.011A TRAUMATIC TEAR OF RIGHT ROTATOR CUFF, UNSPECIFIED TEAR EXTENT, INITIAL ENCOUNTER: Primary | ICD-10-CM

## 2024-09-17 LAB
EOSINOPHIL NFR FLD MANUAL: 3 %
LYMPHOCYTES NFR FLD MANUAL: 26 %
MONOCYTES NFR FLD: 4 %
NEUTROPHILS NFR FLD MANUAL: 67 %

## 2024-09-17 PROCEDURE — 87070 CULTURE OTHR SPECIMN AEROBIC: CPT | Performed by: FAMILY MEDICINE

## 2024-09-17 PROCEDURE — 20611 DRAIN/INJ JOINT/BURSA W/US: CPT | Performed by: FAMILY MEDICINE

## 2024-09-17 PROCEDURE — 89051 BODY FLUID CELL COUNT: CPT | Performed by: FAMILY MEDICINE

## 2024-09-17 PROCEDURE — 87205 SMEAR GRAM STAIN: CPT | Performed by: FAMILY MEDICINE

## 2024-09-17 PROCEDURE — 1159F MED LIST DOCD IN RCRD: CPT | Performed by: FAMILY MEDICINE

## 2024-09-17 PROCEDURE — G0463 HOSPITAL OUTPT CLINIC VISIT: HCPCS

## 2024-09-17 PROCEDURE — 1160F RVW MEDS BY RX/DR IN RCRD: CPT | Performed by: FAMILY MEDICINE

## 2024-09-18 LAB
GRAM STN SPEC: NORMAL
GRAM STN SPEC: NORMAL

## 2024-09-18 RX ORDER — OMEPRAZOLE 40 MG/1
40 CAPSULE, DELAYED RELEASE ORAL EVERY MORNING
Qty: 90 CAPSULE | Refills: 0 | OUTPATIENT
Start: 2024-09-18

## 2024-09-23 LAB
BACTERIA FLD CULT: NORMAL
GRAM STN SPEC: NORMAL
GRAM STN SPEC: NORMAL

## 2024-10-18 ENCOUNTER — APPOINTMENT (OUTPATIENT)
Dept: GENERAL RADIOLOGY | Facility: HOSPITAL | Age: 54
End: 2024-10-18
Payer: COMMERCIAL

## 2024-10-18 ENCOUNTER — HOSPITAL ENCOUNTER (EMERGENCY)
Facility: HOSPITAL | Age: 54
Discharge: HOME | End: 2024-10-18
Attending: EMERGENCY MEDICINE
Payer: COMMERCIAL

## 2024-10-18 VITALS
BODY MASS INDEX: 30.06 KG/M2 | HEART RATE: 82 BPM | RESPIRATION RATE: 20 BRPM | TEMPERATURE: 98.2 F | SYSTOLIC BLOOD PRESSURE: 133 MMHG | DIASTOLIC BLOOD PRESSURE: 96 MMHG | WEIGHT: 210 LBS | OXYGEN SATURATION: 98 % | HEIGHT: 70 IN

## 2024-10-18 DIAGNOSIS — S52.501A CLOSED FRACTURE OF DISTAL END OF RIGHT RADIUS, UNSPECIFIED FRACTURE MORPHOLOGY, INITIAL ENCOUNTER: Primary | ICD-10-CM

## 2024-10-18 PROCEDURE — 73110 X-RAY EXAM OF WRIST: CPT

## 2024-10-18 PROCEDURE — 99283 EMERGENCY DEPT VISIT LOW MDM: CPT | Performed by: EMERGENCY MEDICINE

## 2024-10-18 RX ORDER — HYDROCODONE BITARTRATE AND ACETAMINOPHEN 5; 325 MG/1; MG/1
1 TABLET ORAL ONCE
Status: COMPLETED | OUTPATIENT
Start: 2024-10-18 | End: 2024-10-18

## 2024-10-18 RX ORDER — HYDROCODONE BITARTRATE AND ACETAMINOPHEN 5; 325 MG/1; MG/1
1 TABLET ORAL 4 TIMES DAILY PRN
Qty: 6 TABLET | Refills: 0 | Status: SHIPPED | OUTPATIENT
Start: 2024-10-18

## 2024-10-18 RX ADMIN — HYDROCODONE BITARTRATE AND ACETAMINOPHEN 1 TABLET: 5; 325 TABLET ORAL at 20:00

## 2024-10-18 NOTE — ED TRIAGE NOTES
Got up to go to the bathroom got dizzy and fell landing on outstretched R arm.  This occurred about 4hrs ago- iced for short time did not take any OTCs    Did not hit head denies any other pain or discomfort states he has been having these dizzy episodes with getting up for a bit and is working with PCP to figure out why.    Noted swelling/deformity to R arm just above wrist     CMS intact no open wound

## 2024-10-18 NOTE — DISCHARGE INSTRUCTIONS
Your were evaluated for a distal radius fracture.  Please continue to treat symptoms at home as needed.  You should follow up with Orthopedics.  Please return to the Emergency Department with any concerning symptoms.  Rc Starr MD

## 2024-10-18 NOTE — ED PROVIDER NOTES
Subjective   History of Present Illness  53 yo M witih no relevant PMHx presents with 1-day h/o right wrist pain.  Pt states that he was watching TV and got up, felt dizzy, and fell.  Pt states he broke his fall with his right arm and is now having some pain to right wrist.  No loc, no neck pain, no other injuries or complaints.        Review of Systems   Constitutional:  Negative for fever.   Cardiovascular:  Negative for chest pain.   Gastrointestinal:  Negative for abdominal pain.   Musculoskeletal:  Positive for arthralgias (right wrist). Negative for neck pain.   Neurological:  Negative for syncope.       Past Medical History:   Diagnosis Date    Depression     Drug abuse     GERD (gastroesophageal reflux disease)     RENE (obstructive sleep apnea)     wears cpap       No Known Allergies    Past Surgical History:   Procedure Laterality Date    COLONOSCOPY W/ POLYPECTOMY N/A 8/22/2024    Procedure: COLONOSCOPY WITH POLYPECTOMY;  Surgeon: Rainer Doty MD;  Location: [facility];  Service: Gastroenterology;  Laterality: N/A;  diverticulosis, ascending polyp x2, transverse polyp x2, descending polyp, sigmoid polyp    ENDOSCOPY N/A 8/22/2024    Procedure: ESOPHAGOGASTRODUODENOSCOPY WITH BIOPSY;  Surgeon: Rainer Doty MD;  Location: [facility];  Service: Gastroenterology;  Laterality: N/A;  gastritis       Family History   Problem Relation Age of Onset    Cancer Mother     Cancer Father     Cirrhosis Father     Liver disease Sister     Cancer Brother        Social History     Socioeconomic History    Marital status: Single   Tobacco Use    Smoking status: Former     Current packs/day: 0.25     Types: Cigarettes     Passive exposure: Current    Smokeless tobacco: Former   Vaping Use    Vaping status: Every Day    Substances: Nicotine, Flavoring    Devices: Pre-filled pod    Passive vaping exposure: Yes   Substance and Sexual Activity    Alcohol use: Not Currently    Drug use: Yes     Comment: fentanyl, heroin -  last use 07/2023    Sexual activity: Defer           Objective   Physical Exam  Vitals and nursing note reviewed.   Constitutional:       Appearance: Normal appearance.      Comments: well-appearing   Cardiovascular:      Pulses: Normal pulses.   Pulmonary:      Effort: Pulmonary effort is normal. No respiratory distress.   Musculoskeletal:         General: Swelling and tenderness (right wrist) present.      Cervical back: No tenderness.         Splint - Cast - Strapping    Date/Time: 10/18/2024 8:38 PM    Performed by: Rc Starr MD  Authorized by: Rc Starr MD    Consent:     Consent obtained:  Verbal    Consent given by:  Patient  Universal protocol:     Patient identity confirmed:  Verbally with patient  Pre-procedure details:     Distal neurologic exam:  Normal    Distal perfusion: distal pulses strong    Procedure details:     Location:  Wrist    Wrist location:  R wrist    Splint type:  Sugar tong    Supplies:  Fiberglass    Attestation: Splint applied and adjusted personally by me    Post-procedure details:     Distal neurologic exam:  Normal    Procedure completion:  Tolerated well, no immediate complications             ED Course                                             Medical Decision Making  55 yo M with PMHx chronic pain on methadone presents with 1-day h/o right wrist pain after fall. No other injuries or complaints.  Examination with some swelling and tenderness to right wrist.  Will evaluate for acute process.    19:56 EDT  Xray confirms distal radius fracture, will splint.  No other evidence of other emergent medical condition clinically.  Pt doing ok, appears comfortable, seems stable for home care.  No indication for hospitalization or further emergent management in this context.  Discussed results and poc for dc home, symptom management, Ortho follow-up, return precautions.  Pt is on methadone but states he can take additional meds for breakthrough pain, will give.      Problems  Addressed:  Closed fracture of distal end of right radius, unspecified fracture morphology, initial encounter: complicated acute illness or injury    Amount and/or Complexity of Data Reviewed  External Data Reviewed: radiology and notes.  Radiology: ordered and independent interpretation performed.    Risk  Prescription drug management.        Final diagnoses:   Closed fracture of distal end of right radius, unspecified fracture morphology, initial encounter       ED Disposition  ED Disposition       ED Disposition   Discharge    Condition   Stable    Comment   --               Rohan Shaikh MD  [address]  [facility] [id number] [phone]    Schedule an appointment as soon as possible for a visit       [facility] EMERGENCY DEPARTMENT  [address] [facility] [place] [address] [phone]    As needed         Medication List        New Prescriptions      HYDROcodone-acetaminophen 5-325 MG per tablet  Commonly known as: NORCO  Take 1 tablet by mouth 4 (Four) Times a Day As Needed for Moderate Pain or Severe Pain.               Where to Get Your Medications        These medications were sent to [facility] [id number] - [place], [place] - [address] - [phone] [facility] [phone]   [address] [address]      Phone: [phone]   HYDROcodone-acetaminophen 5-325 MG per tablet            Rc Starr MD  10/18/24 2038

## 2024-10-23 ENCOUNTER — OFFICE VISIT (OUTPATIENT)
Dept: ORTHOPEDIC SURGERY | Facility: CLINIC | Age: 54
End: 2024-10-23
Payer: COMMERCIAL

## 2024-10-23 VITALS
DIASTOLIC BLOOD PRESSURE: 87 MMHG | SYSTOLIC BLOOD PRESSURE: 129 MMHG | WEIGHT: 210 LBS | BODY MASS INDEX: 30.06 KG/M2 | HEART RATE: 82 BPM | HEIGHT: 70 IN

## 2024-10-23 DIAGNOSIS — S52.501A CLOSED FRACTURE OF DISTAL END OF RIGHT RADIUS, UNSPECIFIED FRACTURE MORPHOLOGY, INITIAL ENCOUNTER: Primary | ICD-10-CM

## 2024-10-23 NOTE — PROGRESS NOTES
Subjective:     Patient ID: Denny Hairston is a 54 y.o. male.    Chief Complaint:  Right wrist pain, new issue    History of Present Illness  History of Present Illness   The patient presents to the clinic today for evaluation of right wrist pain as a new issue.    He reports experiencing a fall on 10/18/2024 while walking with his right arm extended, resulting in immediate pain in his right wrist. He sought medical attention at the emergency department, where a distal radius fracture was diagnosed. A splint was applied, and he was advised to follow up with an orthopedic specialist.    Today, he describes his pain as moderate, rating it between 4 and 6 on a scale of 10. The pain, which he characterizes as aching and dull, has lessened since the initial injury but sharpens when he attempts to move his wrist. He can move his fingers, although with some difficulty due to stiffness and swelling. He reports no new numbness or tingling.    Activities such as lifting, pushing, or pulling exacerbate his pain. He has found some relief from the pain through the use of oral pain medication prescribed at the emergency department and over-the-counter Tylenol. It is noteworthy that he is right-hand dominant.     Social History     Occupational History     Employer: UNKNOWN EMPLOYER    Occupation: [employer]     Comment: [employer]   Tobacco Use    Smoking status: Former     Current packs/day: 0.25     Types: Cigarettes     Passive exposure: Current    Smokeless tobacco: Former   Vaping Use    Vaping status: Every Day    Substances: Nicotine, Flavoring    Devices: Pre-filled pod    Passive vaping exposure: Yes   Substance and Sexual Activity    Alcohol use: Not Currently    Drug use: Yes     Comment: fentanyl, heroin - last use 07/2023    Sexual activity: Defer      Past Medical History:   Diagnosis Date    Depression     Drug abuse     GERD (gastroesophageal reflux disease)     RENE (obstructive sleep apnea)      "wears cpap     Past Surgical History:   Procedure Laterality Date    COLONOSCOPY W/ POLYPECTOMY N/A 8/22/2024    Procedure: COLONOSCOPY WITH POLYPECTOMY;  Surgeon: Rainer Doty MD;  Location: [facility];  Service: Gastroenterology;  Laterality: N/A;  diverticulosis, ascending polyp x2, transverse polyp x2, descending polyp, sigmoid polyp    ENDOSCOPY N/A 8/22/2024    Procedure: ESOPHAGOGASTRODUODENOSCOPY WITH BIOPSY;  Surgeon: Rainer Doty MD;  Location: [facility];  Service: Gastroenterology;  Laterality: N/A;  gastritis       Family History   Problem Relation Age of Onset    Cancer Mother     Cancer Father     Cirrhosis Father     Liver disease Sister     Cancer Brother          Review of Systems        Objective:  Vitals:    10/23/24 1050   BP: 129/87   Pulse: 82   Weight: 95.3 kg (210 lb)   Height: 177.8 cm (70\")         10/23/24  1050   Weight: 95.3 kg (210 lb)     Body mass index is 30.13 kg/m².  Physical Exam    Vital signs reviewed.   General: No acute distress, alert and oriented  Eyes: conjunctiva clear; pupils equally round and reactive  ENT: external ears and nose atraumatic; oropharynx clear  CV: no peripheral edema  Resp: normal respiratory effort  Skin: no rashes or wounds; normal turgor  Psych: mood and affect appropriate; recent and remote memory intact          Physical Exam  Right wrist has a splint in place, no open wounds, lacerations, or abrasions. Patient is able to flex and extend fingers of the right hand including thumb with 4 out of 5 strength limited secondary to referred pain as well as digital swelling. Positive sensation to light touch in all distributions, right hand, symmetric to the left. No tenderness about the right elbow or shoulder.         Imaging:  XR Wrist 3+ View Right    Result Date: 10/18/2024  Dorsally angulated mildly displaced distal radial fracture. Electronically Signed: Eric Law MD  10/18/2024 7:49 PM EDT  Workstation ID: [id number]      Repeat three-view " x-rays right distal radius from today's visit, ordered and reviewed by me, indication displaced right distal radius fracture, compared to outside x-rays as noted above from emergency department indicate mild dorsal angulation of distal radius fracture with mild loss of radial inclination overall AP view appears to be reasonable at this point in time. There is a comminuted zone in the dorsal cortex.  DRUJ appears acceptable at this time.  Assessment:        1. Closed fracture of distal end of right radius, unspecified fracture morphology, initial encounter           Plan:          Assessment & Plan  1. Right wrist pain.  The patient presents with right wrist pain following a fall on October 18, which resulted in a distal radius fracture. He reports moderate pain, rated 4-6 out of 10, with some sharp pain during wrist motion. He has been using oral pain medication from the emergency department and Tylenol with moderate improvement. Examination reveals a splint in place, no open wounds, and limited finger movement due to swelling. X-rays from the initial ER visit and today show reasonable alignment but with dorsal angulation, loss of radial height, and a region of comminution dorsally, increasing the risk for late displacement and angulation.   Closed treatment was discussed and chosen by the patient after understanding the risks, benefits, and alternatives. A well-padded Exos splint was applied with volar flexion and ulnar deviation to stabilize the fracture. A repeat x-ray out of the brace is scheduled for Monday to monitor progress.    Follow-up  Return on Monday for repeat x-ray.    Denny Hairston was in agreement with plan and had all questions answered.     Orders:  Orders Placed This Encounter   Procedures    XR Wrist 3+ View Right       Medications:  No orders of the defined types were placed in this encounter.      Followup:  Return in about 5 days (around 10/28/2024) for xrays needed at follow up.    Diagnoses  and all orders for this visit:    1. Closed fracture of distal end of right radius, unspecified fracture morphology, initial encounter (Primary)  -     XR Wrist 3+ View Right                  Dictated utilizing Dragon dictation     Patient or patient representative verbalized consent for the use of Ambient Listening during the visit with  Rohan Shaikh MD for chart documentation. 10/23/2024  11:02 EDT

## 2024-10-28 ENCOUNTER — OFFICE VISIT (OUTPATIENT)
Dept: ORTHOPEDIC SURGERY | Facility: CLINIC | Age: 54
End: 2024-10-28
Payer: COMMERCIAL

## 2024-10-28 VITALS — HEIGHT: 70 IN | WEIGHT: 210 LBS | BODY MASS INDEX: 30.06 KG/M2

## 2024-10-28 DIAGNOSIS — S52.501A CLOSED FRACTURE OF DISTAL END OF RIGHT RADIUS, UNSPECIFIED FRACTURE MORPHOLOGY, INITIAL ENCOUNTER: Primary | ICD-10-CM

## 2024-10-28 PROCEDURE — 99024 POSTOP FOLLOW-UP VISIT: CPT | Performed by: ORTHOPAEDIC SURGERY

## 2024-10-28 PROCEDURE — 73110 X-RAY EXAM OF WRIST: CPT | Performed by: ORTHOPAEDIC SURGERY

## 2024-10-28 RX ORDER — HYDROCODONE BITARTRATE AND ACETAMINOPHEN 5; 325 MG/1; MG/1
1 TABLET ORAL EVERY 4 HOURS PRN
Qty: 30 TABLET | Refills: 0 | Status: SHIPPED | OUTPATIENT
Start: 2024-10-28 | End: 2024-11-04 | Stop reason: SDUPTHER

## 2024-10-28 NOTE — PROGRESS NOTES
CC: Follow-up closed treatment right distal radius fracture-date of injury 10/18/2024    Interval history: Patient returns clinic today stating overall doing reasonably well at this point in time, he has been using his wrist brace as directed.  Denies any new numbness or tingling.      Exam:  Right wrist-moderate soft tissue swelling improved from last visit.  He is able to flex and extend fingers and thumb the right hand with 4 out of 5 strength.  He does have some irritation of the skin over his right forearm at the proximal edge of his brace where he has been scratching.  Positive sensation light touch all distributions right hand symmetric to the left.    imaging:  Three-view x-rays right wrist from today's visit, AP, lateral, oblique views, ordered and reviewed by me, indication status post closed treatment right distal radius fracture, compared to prior outside x-rays indicates stable alignment of distal radius fracture with slight volar tilt still appreciated at this point in time.  Comminution appreciated posteriorly.  Slight loss of radial height and inclination but within acceptable parameters at this time.        Impression: Closed treatment right distal radius fracture    Plan:  Overall patient doing well at this time, fracture appears to be stable he aligned on his repeat x-rays today.  I will see him back in 1 week repeat x-rays right wrist out of brace.  Norco given for pain control.  All questions answered.

## 2024-11-04 ENCOUNTER — OFFICE VISIT (OUTPATIENT)
Dept: ORTHOPEDIC SURGERY | Facility: CLINIC | Age: 54
End: 2024-11-04
Payer: COMMERCIAL

## 2024-11-04 VITALS — HEIGHT: 70 IN | BODY MASS INDEX: 30.06 KG/M2 | WEIGHT: 210 LBS

## 2024-11-04 DIAGNOSIS — S52.501A CLOSED FRACTURE OF DISTAL END OF RIGHT RADIUS, UNSPECIFIED FRACTURE MORPHOLOGY, INITIAL ENCOUNTER: Primary | ICD-10-CM

## 2024-11-04 PROCEDURE — 1160F RVW MEDS BY RX/DR IN RCRD: CPT | Performed by: ORTHOPAEDIC SURGERY

## 2024-11-04 PROCEDURE — 99024 POSTOP FOLLOW-UP VISIT: CPT | Performed by: ORTHOPAEDIC SURGERY

## 2024-11-04 PROCEDURE — 1159F MED LIST DOCD IN RCRD: CPT | Performed by: ORTHOPAEDIC SURGERY

## 2024-11-04 PROCEDURE — 73110 X-RAY EXAM OF WRIST: CPT | Performed by: ORTHOPAEDIC SURGERY

## 2024-11-04 RX ORDER — HYDROCODONE BITARTRATE AND ACETAMINOPHEN 5; 325 MG/1; MG/1
1 TABLET ORAL EVERY 4 HOURS PRN
Qty: 30 TABLET | Refills: 0 | Status: SHIPPED | OUTPATIENT
Start: 2024-11-04

## 2024-11-04 NOTE — PROGRESS NOTES
CC: Follow-up closed treatment right distal radius fracture-date of injury 10/18/2024     Interval history: Patient returns clinic today stating overall doing reasonably well at this point in time, still is having moderate levels of pain which is well-controlled with Norco.  He has been treating a wound over his right proximal forearm with Neosporin and recently started doing some padded dressing on that underneath his forearm brace which he has been using routinely.        Exam:  Right wrist-tolerates flexion and extension at 30 degrees in each direction, radial ulnar deviation to 10 degrees distraction.  Flexion extend all fingers of the right hand with 4+ out of 5 strength.  Soft tissue wound over proximal forearm at the proximal margin of his brace with mild serous drainage, no subcutaneous fluid collection, mild erythema.  Positive sensation light touch all distributions right hand symmetric to the left.     imaging:  Three-view x-rays right wrist from today's visit, AP, lateral, oblique views, ordered and reviewed by me, indication status post closed treatment right distal radius fracture, compared to prior office x-rays indicates stable alignment of distal radius fracture with slight volar tilt still appreciated at this point in time but some evidence of additional shortening in comparison to prior films.  Comminution appreciated posteriorly.  Slight loss of radial height and inclination but within acceptable parameters at this time.           Impression: Closed treatment right distal radius fracture     Plan:  Recommended continue use of brace at all times, discussed with patient that he does have some shortening at his fracture site but overall stability and alignment of the distal radius appears acceptable at this time.  We did discuss option for open reduction internal fixation but he wants to continue with closed treatment at this point.  Recommended continued padded dressings for his soft tissue wound.   Follow-up in 3 weeks repeat x-rays right wrist out of brace or sooner if needed.

## 2024-11-25 ENCOUNTER — OFFICE VISIT (OUTPATIENT)
Dept: ORTHOPEDIC SURGERY | Facility: CLINIC | Age: 54
End: 2024-11-25
Payer: COMMERCIAL

## 2024-11-25 VITALS — WEIGHT: 210 LBS | BODY MASS INDEX: 30.06 KG/M2 | HEIGHT: 70 IN

## 2024-11-25 DIAGNOSIS — S52.501A CLOSED FRACTURE OF DISTAL END OF RIGHT RADIUS, UNSPECIFIED FRACTURE MORPHOLOGY, INITIAL ENCOUNTER: Primary | ICD-10-CM

## 2024-11-25 PROCEDURE — 99024 POSTOP FOLLOW-UP VISIT: CPT | Performed by: ORTHOPAEDIC SURGERY

## 2024-11-25 NOTE — PROGRESS NOTES
CC: Follow-up closed treatment right distal radius fracture-date of injury 10/18/2024     Interval history: Patient returns clinic today stating overall well at this point in time, feeling better with his wrist.  He is noted better motion.  Intermittently coming out of the brace to work on range of motion.  Denies fevers chills or sweats.        Exam:  Right wrist-active forward flexion 70 degrees, active extension 30 degrees, radial ulnar deviation to 15 degrees in each direction.  Minimal residual tenderness over distal radius.  Mild soft tissue swelling noted.  Positive sensation light touch all distributions right hand symmetric to the left.     imaging:  Three-view x-rays right wrist from today's visit, AP, lateral, oblique views, ordered and reviewed by me, indication status post closed treatment right distal radius fracture, compared to prior office x-rays indicates mild loss of radial height inclination with significant callus formation in the interosseous and periosteal regions, overall fairly well-maintained dorsal tilt on lateral view.            Impression: Closed treatment right distal radius fracture     Plan:  Patient overall doing fairly well at this point in time with his wrist, given good improvement with his healing and callus formation on x-rays today he can come out of the brace intermittently to work on active range of motion with light lifting up to 1 pound at this time.  Follow-up in 4 weeks repeat x-rays right wrist.  If he is doing well at that point in time we will discontinue brace completely and get him into resisted activities.  He was in agreement had all questions answered.

## 2024-12-02 ENCOUNTER — OFFICE VISIT (OUTPATIENT)
Dept: GASTROENTEROLOGY | Facility: CLINIC | Age: 54
End: 2024-12-02
Payer: COMMERCIAL

## 2024-12-02 VITALS
WEIGHT: 202.2 LBS | DIASTOLIC BLOOD PRESSURE: 70 MMHG | SYSTOLIC BLOOD PRESSURE: 102 MMHG | HEIGHT: 70 IN | BODY MASS INDEX: 28.95 KG/M2

## 2024-12-02 DIAGNOSIS — K21.9 GASTROESOPHAGEAL REFLUX DISEASE, UNSPECIFIED WHETHER ESOPHAGITIS PRESENT: ICD-10-CM

## 2024-12-02 PROCEDURE — 1160F RVW MEDS BY RX/DR IN RCRD: CPT

## 2024-12-02 PROCEDURE — 1159F MED LIST DOCD IN RCRD: CPT

## 2024-12-02 PROCEDURE — 99214 OFFICE O/P EST MOD 30 MIN: CPT

## 2024-12-02 RX ORDER — FAMOTIDINE 20 MG/1
20 TABLET, FILM COATED ORAL 2 TIMES DAILY PRN
Qty: 60 TABLET | Refills: 30 | Status: SHIPPED | OUTPATIENT
Start: 2024-12-02

## 2024-12-02 RX ORDER — VONOPRAZAN FUMARATE 26.72 MG/1
20 TABLET ORAL DAILY
Qty: 30 TABLET | Refills: 1 | Status: SHIPPED | OUTPATIENT
Start: 2024-12-02

## 2024-12-02 NOTE — PROGRESS NOTES
PATIENT INFORMATION  Denny Hairston       - 1970    CHIEF COMPLAINT  No chief complaint on file.      HISTORY OF PRESENT ILLNESS  Here today for EGD and Colonoscopy follow-up    2024 EGD for GERD with a medium sized HH, gastritis, neg for HP, with rare IM, no esophagitis.   2024 Colonoscopy with 5 TA, 1 TVA all subcentimeter recommend 1 yr repeat with plenvue.  Reviewed path and images with patient.    In cast related to wrist fracture.    HB reported daily to every other day, with daily 40 mg omeprazole and 20 mg OTC dose at night to control sx. Stomach is doing well when taking voquezna, but has only taken it for 2 months, then it was not refilled. Currently on omeprazole with daily HB, Was taking omeprazole with voquezna. Will resume voquezna, discontinue omeprazole, ok to use famotidine. 3-4 NSAIDs, using tylenol PRN. Reviewed eating small, frequent meals, and avoiding high acid foods.    Bowels are doing ok. BRAD managed on methadone.      REVIEWED PERTINENT RESULTS/ LABS  Lab Results   Component Value Date    CASEREPORT  2024     Surgical Pathology Report                         Case: [id number]                                  Authorizing Provider:  Rainer Doty MD       Collected:           2024 10:33 AM          Ordering Location:     [facility]   Received:            2024 12:46 PM                                 OR                                                                           Pathologist:           Eugenia Frank MD                                                    Specimens:   1) - Gastric, Antrum, bx, r/o h pylori                                                              2) - Large Intestine, Right / Ascending Colon, x2                                                   3) - Large Intestine, Transverse Colon, x2                                                          4) - Large Intestine, Left / Descending Colon                                                        5) - Large Intestine, Sigmoid Colon                                                        FINALDX  08/22/2024     1.  Stomach, antrum, biopsy:   -Mild gastropathy with rare focus of intestinal metaplasia.   -Negative for Helicobacter on routine staining.   -Negative for dysplasia.    2.  Ascending colon, biopsy:   -Tubular adenomas.   -Negative for high-grade dysplasia.    3.  Transverse colon, biopsies:   -Tubular adenomas.   -Negative for high-grade dysplasia.    4.  Descending colon, biopsies:   -Tubular adenomas.   -Negative for high-grade dysplasia.    5.  Sigmoid colon, biopsy:   -Tubulovillous adenoma.   -Negative for high-grade dysplasia.    [facility]       Lab Results   Component Value Date    HGB 9.7 (L) 07/12/2024    MCV 77.3 (L) 07/12/2024     07/12/2024    ALT 29 07/12/2024    AST 47 (H) 07/12/2024    HGBA1C 6.2 (H) 07/30/2024    TRIG 146 03/04/2024    FERRITIN 26 (L) 07/30/2024    TIBC 429 07/30/2024      XR Wrist 3+ View Right    Result Date: 11/25/2024  Impression: Ordering physician's impression is located in the Encounter Note dated 11/25/24.      XR Wrist 3+ View Right    Result Date: 11/4/2024  Impression: Ordering physician's impression is located in the Encounter Note dated 11/04/24.       REVIEW OF SYSTEMS  Review of Systems   Constitutional:  Negative for activity change, chills, fever and unexpected weight change.   HENT:  Negative for congestion.    Eyes:  Negative for visual disturbance.   Respiratory:  Negative for shortness of breath.    Cardiovascular:  Negative for chest pain and palpitations.   Gastrointestinal:  Negative for abdominal pain and blood in stool.        Reflux   Endocrine: Negative for cold intolerance and heat intolerance.   Genitourinary:  Negative for hematuria.   Musculoskeletal:  Negative for gait problem.   Skin:  Negative for color change.   Allergic/Immunologic: Negative for immunocompromised state.   Neurological:   Negative for weakness and light-headedness.   Hematological:  Negative for adenopathy.   Psychiatric/Behavioral:  Negative for sleep disturbance. The patient is not nervous/anxious.        ACTIVE PROBLEMS  Patient Active Problem List    Diagnosis    • Primary insomnia [F51.01]    • Cocaine use disorder [F14.10]    • Gastroesophageal reflux disease [K21.9]    • Methamphetamine abuse [F15.10]    • Tobacco dependence syndrome [F17.200]    • Adjustment insomnia [F51.02]    • Obstructive sleep apnea [G47.33]    • Positive colorectal cancer screening using Cologuard test [R19.5]    • Recurrent major depressive disorder, in full remission [F33.42]    • Heroin use disorder, severe, in early remission [F11.21]          PAST MEDICAL HISTORY  Past Medical History:   Diagnosis Date   • Depression    • Drug abuse    • GERD (gastroesophageal reflux disease)    • RENE (obstructive sleep apnea)     wears cpap         SURGICAL HISTORY  Past Surgical History:   Procedure Laterality Date   • COLONOSCOPY W/ POLYPECTOMY N/A 8/22/2024    Procedure: COLONOSCOPY WITH POLYPECTOMY;  Surgeon: Rainer Doty MD;  Location: [facility] OR;  Service: Gastroenterology;  Laterality: N/A;  diverticulosis, ascending polyp x2, transverse polyp x2, descending polyp, sigmoid polyp   • ENDOSCOPY N/A 8/22/2024    Procedure: ESOPHAGOGASTRODUODENOSCOPY WITH BIOPSY;  Surgeon: Rainer Doty MD;  Location: [facility] OR;  Service: Gastroenterology;  Laterality: N/A;  gastritis         FAMILY HISTORY  Family History   Problem Relation Age of Onset   • Cancer Mother    • Cancer Father    • Cirrhosis Father    • Liver disease Sister    • Cancer Brother          SOCIAL HISTORY  Social History     Occupational History     Employer: UNKNOWN EMPLOYER   • Occupation: [employer]     Comment: [employer]   Tobacco Use   • Smoking status: Former     Current packs/day: 0.25     Types: Cigarettes     Passive exposure: Current   • Smokeless tobacco: Former    Vaping Use   • Vaping status: Every Day   • Substances: Nicotine, Flavoring   • Devices: Pre-filled pod   • Passive vaping exposure: Yes   Substance and Sexual Activity   • Alcohol use: Not Currently   • Drug use: Yes     Comment: fentanyl, heroin - last use 07/2023   • Sexual activity: Defer         CURRENT MEDICATIONS    Current Outpatient Medications:   •  busPIRone (BUSPAR) 5 MG tablet, Take 1 tablet by mouth 3 (Three) Times a Day., Disp: 90 tablet, Rfl: 2  •  ferrous sulfate 325 (65 FE) MG tablet, Take 1 tablet by mouth Daily With Breakfast., Disp: 30 tablet, Rfl: 2  •  FLUoxetine (PROzac) 20 MG capsule, TAKE 1 CAPSULE BY MOUTH DAILY, Disp: 30 capsule, Rfl: 2  •  HYDROcodone-acetaminophen (NORCO) 5-325 MG per tablet, Take 1 tablet by mouth 4 (Four) Times a Day As Needed for Moderate Pain or Severe Pain. (Patient not taking: Reported on 10/28/2024), Disp: 6 tablet, Rfl: 0  •  HYDROcodone-acetaminophen (NORCO) 5-325 MG per tablet, Take 1 tablet by mouth Every 4 (Four) Hours As Needed for Moderate Pain or Severe Pain., Disp: 30 tablet, Rfl: 0  •  methadone (DOLOPHINE) 10 MG tablet, Take 12 tablets by mouth Daily,, Disp: , Rfl:   •  naloxone (NARCAN) 4 MG/0.1ML nasal spray, Call 911. Don't prime. [place] in 1 nostril for overdose. Repeat in 2-3 minutes in other nostril if no or minimal breathing/responsiveness., Disp: 2 each, Rfl: 0  •  naproxen (NAPROSYN) 500 MG tablet, Take 1 tablet by mouth 2 (Two) Times a Day With Meals., Disp: 60 tablet, Rfl: 0  •  QUEtiapine (SEROquel) 50 MG tablet, Take 1 tablet by mouth Every Night., Disp: 30 tablet, Rfl: 2  •  Vonoprazan Fumarate (Voquezna) 20 MG tablet, Take 1 tablet by mouth Daily., Disp: 30 tablet, Rfl: 1    ALLERGIES  Patient has no known allergies.    VITALS  There were no vitals filed for this visit.    PHYSICAL EXAM  Debilities/Disabilities Identified: None  Emotional Behavior: Appropriate  Wt Readings from Last 3 Encounters:   11/25/24 95.3 kg (210 lb)   11/04/24  "95.3 kg (210 lb)   10/28/24 95.3 kg (210 lb)     Ht Readings from Last 1 Encounters:   11/25/24 177.8 cm (70\")     There is no height or weight on file to calculate BMI.  Physical Exam  Constitutional:       General: He is not in acute distress.     Appearance: Normal appearance. He is not ill-appearing.   HENT:      Head: Normocephalic and atraumatic.      Mouth/Throat:      Mouth: Mucous membranes are moist.      Pharynx: No posterior oropharyngeal erythema.   Eyes:      General: No scleral icterus.  Cardiovascular:      Rate and Rhythm: Normal rate and regular rhythm.      Heart sounds: Normal heart sounds.   Pulmonary:      Effort: Pulmonary effort is normal.      Breath sounds: Normal breath sounds.   Abdominal:      General: Abdomen is flat. Bowel sounds are normal. There is no distension.      Palpations: Abdomen is soft. There is no mass.      Tenderness: There is no abdominal tenderness. There is no guarding or rebound. Negative signs include Palmer's sign.      Hernia: No hernia is present.   Musculoskeletal:      Cervical back: Neck supple.   Skin:     General: Skin is warm.      Capillary Refill: Capillary refill takes less than 2 seconds.   Neurological:      General: No focal deficit present.      Mental Status: He is alert and oriented to person, place, and time.   Psychiatric:         Mood and Affect: Mood normal.         Behavior: Behavior normal.         Thought Content: Thought content normal.         Judgment: Judgment normal.     CLINICAL DATA REVIEWED   reviewed previous lab results and integrated with today's visit, reviewed notes from other physicians and/or last GI encounter, reviewed previous endoscopy results and available photos, reviewed surgical pathology results from previous biopsies    ASSESSMENT  There are no diagnoses linked to this encounter.      PLAN    Reflux/Gastritis: Reviewed low acid measures, foods to avoid, start voquezna 20 x 2 mos, then will decrease to 10 mg. OK to use " PRN pepcid.  Call if any issues.    No follow-ups on file.    I have discussed the above plan with the patient.  They verbalize understanding and are in agreement with the plan.  They have been advised to contact the office for any questions, concerns, or changes related to their health.

## 2025-01-08 ENCOUNTER — OFFICE VISIT (OUTPATIENT)
Dept: ORTHOPEDIC SURGERY | Facility: CLINIC | Age: 55
End: 2025-01-08
Payer: COMMERCIAL

## 2025-01-08 VITALS — HEIGHT: 70 IN | BODY MASS INDEX: 28.32 KG/M2 | WEIGHT: 197.8 LBS

## 2025-01-08 DIAGNOSIS — S52.501A CLOSED FRACTURE OF DISTAL END OF RIGHT RADIUS, UNSPECIFIED FRACTURE MORPHOLOGY, INITIAL ENCOUNTER: Primary | ICD-10-CM

## 2025-01-08 PROCEDURE — 99024 POSTOP FOLLOW-UP VISIT: CPT | Performed by: ORTHOPAEDIC SURGERY

## 2025-01-08 NOTE — PROGRESS NOTES
Subjective:     Patient ID: Denny Hairston is a 54 y.o. male.    Chief Complaint:  Follow-up closed treatment right distal radius fracture-date of injury 10/18/2024   History of Present Illness  History of Present Illness  The patient returns to the clinic today for a follow-up evaluation of his right wrist. Overall, he is doing well at this point in time. He has been working on motion out of the wrist brace and notes improvement in function. He reports no numbness or tingling.    He has been working on motion out of the wrist brace and notes improvement in function. He reports no numbness or tingling.    He does note some persistent lingering pain in his right shoulder, but overall it is doing better at this time.     Social History     Occupational History     Employer: UNKNOWN EMPLOYER    Occupation: [employer]     Comment: [employer]   Tobacco Use    Smoking status: Former     Current packs/day: 0.25     Types: Cigarettes     Passive exposure: Current    Smokeless tobacco: Former   Vaping Use    Vaping status: Every Day    Substances: Nicotine, Flavoring    Devices: Pre-filled pod    Passive vaping exposure: Yes   Substance and Sexual Activity    Alcohol use: Not Currently    Drug use: Yes     Comment: fentanyl, heroin - last use 07/2023    Sexual activity: Defer      Past Medical History:   Diagnosis Date    Depression     Drug abuse     GERD (gastroesophageal reflux disease)     RENE (obstructive sleep apnea)     wears cpap     Past Surgical History:   Procedure Laterality Date    COLONOSCOPY W/ POLYPECTOMY N/A 8/22/2024    Procedure: COLONOSCOPY WITH POLYPECTOMY;  Surgeon: Rainer Doty MD;  Location: [facility] OR;  Service: Gastroenterology;  Laterality: N/A;  diverticulosis, ascending polyp x2, transverse polyp x2, descending polyp, sigmoid polyp    ENDOSCOPY N/A 8/22/2024    Procedure: ESOPHAGOGASTRODUODENOSCOPY WITH BIOPSY;  Surgeon: Rainer Doty MD;  Location: [facility] OR;  Service:  "Gastroenterology;  Laterality: N/A;  gastritis       Family History   Problem Relation Age of Onset    Cancer Mother     Cancer Father     Cirrhosis Father     Liver disease Sister     Cancer Brother          Review of Systems        Objective:  Vitals:    01/08/25 0848   Weight: 89.7 kg (197 lb 12.8 oz)   Height: 177.8 cm (70\")         01/08/25 0848   Weight: 89.7 kg (197 lb 12.8 oz)     Body mass index is 28.38 kg/m².  General: No acute distress.  Resp: normal respiratory effort  Skin: no rashes or wounds; normal turgor  Psych: mood and affect appropriate; recent and remote memory intact          Physical Exam  The right wrist shows active extension and flexion at 70 degrees, with 4+ out of 5 strength. Radial and ulnar deviation are 20 degrees in each direction. Supination and pronation are 80 degrees in each direction, with 4+ out of 5 strength. No focal tenderness over the distal radius. All digits on the right hand can flex and extend, with MCP and IP joints showing 4+ out of 5 strength.         Imaging:  Right Wrist X-Ray  Indication: Status post closed treatment distal radius fracture  AP, Lateral, and Oblique views    Findings:  Distal radius fracture appears to be stable in position with neutral to slight volar tilt noted, mild loss of radial height and inclination.  Significant reactive callus in the periosteal regions on all 4 cortices on both AP and lateral views.  Fracture appears to be well-healed at this time.  Compared to prior office x-rays.      Assessment:        1. Closed fracture of distal end of right radius, unspecified fracture morphology, initial encounter           Plan:          Assessment & Plan  1. Right wrist fracture.  X-rays show good signs of healing, and the fracture is reasonably well aligned. He reports no numbness or tingling and has been working on motion out of the wrist brace with improvement in function. He can come out of the brace as tolerated and use it for support during " lifting, pushing, or pulling activities if needed, but should aim to be out of it as much as possible.    2. Right shoulder pain.  He notes some persistent lingering pain in his right shoulder, but overall it is doing better at this time. A follow-up appointment is scheduled in 6 weeks for reevaluation of his right shoulder.    Follow-up  The patient will follow up in 6 weeks.    Denny Hairston was in agreement with plan and had all questions answered.     Orders:  Orders Placed This Encounter   Procedures    XR Wrist 3+ View Right       Medications:  No orders of the defined types were placed in this encounter.      Followup:  No follow-ups on file.    Diagnoses and all orders for this visit:    1. Closed fracture of distal end of right radius, unspecified fracture morphology, initial encounter (Primary)  -     XR Wrist 3+ View Right                  Dictated utilizing Dragon dictation     Patient or patient representative verbalized consent for the use of Ambient Listening during the visit with  Rohan Shaikh MD for chart documentation. 1/8/2025  09:21 EST

## 2025-01-27 DIAGNOSIS — F41.9 SEVERE ANXIETY: ICD-10-CM

## 2025-01-27 NOTE — TELEPHONE ENCOUNTER
Rx Refill Note  Requested Prescriptions     Pending Prescriptions Disp Refills    FLUoxetine (PROzac) 20 MG capsule [Pharmacy Med Name: FLUOXETINE 20MG CAPSULES] 30 capsule 2     Sig: TAKE 1 CAPSULE BY MOUTH DAILY      Last office visit with prescribing clinician: 9/4/2024   Last telemedicine visit with prescribing clinician: Visit date not found   Next office visit with prescribing clinician: 3/4/2025

## 2025-02-19 ENCOUNTER — OFFICE VISIT (OUTPATIENT)
Dept: ORTHOPEDIC SURGERY | Facility: CLINIC | Age: 55
End: 2025-02-19
Payer: COMMERCIAL

## 2025-02-19 VITALS — HEIGHT: 70 IN | WEIGHT: 197 LBS | BODY MASS INDEX: 28.2 KG/M2

## 2025-02-19 DIAGNOSIS — M75.21 BICEPS TENDINITIS OF RIGHT UPPER EXTREMITY: ICD-10-CM

## 2025-02-19 DIAGNOSIS — S46.011A TRAUMATIC TEAR OF RIGHT ROTATOR CUFF, UNSPECIFIED TEAR EXTENT, INITIAL ENCOUNTER: Primary | ICD-10-CM

## 2025-02-19 DIAGNOSIS — M75.51 SUBACROMIAL BURSITIS OF RIGHT SHOULDER JOINT: ICD-10-CM

## 2025-02-19 NOTE — PROGRESS NOTES
Subjective:     Patient ID: eDnny Hairston is a 55 y.o. male.    Chief Complaint:  Follow-up right shoulder pain, rotator cuff tear, subacromial bursitis, biceps tendinopathy with SLAP lesion    History of Present Illness  History of Present Illness  Denny returns clinic today follow-up evaluation regards to his right shoulder he still having significant pain to the anterior lateral aspect of his right shoulder worse with overhead activities rates as a 8-9 out of 10, aching in nature and occasional sharp pain noted particular with repetitive overhead activities.  He denies any numbness or tingling right upper extremity denies any fever chills or sweats.  Minimal improvement with home exercise for greater than 12 weeks, anti-inflammatory medications, and activity modification.     Social History     Occupational History     Employer: UNKNOWN EMPLOYER    Occupation: [employer]     Comment: [employer]   Tobacco Use    Smoking status: Former     Current packs/day: 0.25     Types: Cigarettes     Passive exposure: Current    Smokeless tobacco: Former   Vaping Use    Vaping status: Every Day    Substances: Nicotine, Flavoring    Devices: Pre-filled pod    Passive vaping exposure: Yes   Substance and Sexual Activity    Alcohol use: Not Currently    Drug use: Yes     Comment: fentanyl, heroin - last use 07/2023    Sexual activity: Defer      Past Medical History:   Diagnosis Date    Depression     Drug abuse     GERD (gastroesophageal reflux disease)     RENE (obstructive sleep apnea)     wears cpap     Past Surgical History:   Procedure Laterality Date    COLONOSCOPY W/ POLYPECTOMY N/A 8/22/2024    Procedure: COLONOSCOPY WITH POLYPECTOMY;  Surgeon: Rainer Doty MD;  Location: [facility];  Service: Gastroenterology;  Laterality: N/A;  diverticulosis, ascending polyp x2, transverse polyp x2, descending polyp, sigmoid polyp    ENDOSCOPY N/A 8/22/2024    Procedure: ESOPHAGOGASTRODUODENOSCOPY WITH BIOPSY;   "Surgeon: Rainer Doty MD;  Location: [facility];  Service: Gastroenterology;  Laterality: N/A;  gastritis       Family History   Problem Relation Age of Onset    Cancer Mother     Cancer Father     Cirrhosis Father     Liver disease Sister     Cancer Brother          Review of Systems        Objective:  Vitals:    02/19/25 0855   Weight: 89.4 kg (197 lb)   Height: 177.8 cm (70\")         02/19/25  0855   Weight: 89.4 kg (197 lb)     Body mass index is 28.27 kg/m².  Physical Exam    Vital signs reviewed.   General: No acute distress, alert and oriented  Eyes: conjunctiva clear; pupils equally round and reactive  ENT: external ears and nose atraumatic; oropharynx clear  CV: no peripheral edema  Resp: normal respiratory effort  Skin: no rashes or wounds; normal turgor  Psych: mood and affect appropriate; recent and remote memory intact          Physical Exam         Right shoulder-active forward flexion 100 degrees, passive forward flexion 135 degrees, 3 out of 5 strength, active external rotation 20 degrees, passive 45 degrees, 4- out of 5 strength, 4+ out of 5 strength belly press test.  Positive drop arm test, negative external rotation lag sign, positive empty can test positive Hinds Neer's positive Yergason and speeds, positive Sully's.  No tenderness over AC joint with negative crossarm test.  Positive deltoid firing.    Imaging:  MRI Shoulder Right Without Contrast     Result Date: 8/30/2024  Impression: 1.Large full-thickness tear involving the entirety of the supraspinatus component of the rotator cuff. 2.No evidence of biceps tendon tear or distal retraction. There may be mild tendinopathy of the proximal biceps tendon. 3.Evidence for a SLAP type VIII tear. 4.A large heterogeneous subacromial/subdeltoid bursal collection is noted. The findings suggest changes of overlying bursitis. There may be a component of intrabursal hemorrhage as well. Surrounding soft tissue edema is noted. 5.Mild osteoarthritis " of the glenohumeral joint. Mild age-related changes of the acromioclavicular joint. Electronically Signed: Alvino Gutierrez MD  8/30/2024 1:47 PM EDT  Workstation ID: [id number]     XR Shoulder 2+ View Right     Result Date: 8/4/2024  Impression: No acute fracture or traumatic malalignment identified. Electronically Signed: Brady Murdock MD  8/4/2024 2:27 PM EDT  Workstation ID: [id number]     Review again of outside x-rays as well as MRI right shoulder-reviewed imaging as well as radiology report indicates large full-thickness rotator cuff tear of the supraspinatus with associated SLAP tear noted and associated subacromial bursitis.  Mild degenerative change of the glenohumeral joint.  Sagittal imaging reveals approximately 25 to 30% atrophy of the supraspinatus.  Large complex subacromial fluid collection noted may be consistent with hemorrhage.    Assessment:        1. Traumatic tear of right rotator cuff, unspecified tear extent, initial encounter           Plan:          Assessment & Plan  Reviewed treatment options at length with patient today in regards to his right shoulder-given his large full-thickness rotator cuff tear with persistent pain and failure of improvement with other conservative treatments as noted above we discussed options and he does wish to proceed with right shoulder arthroscopy, rotator cuff repair versus debridement, biceps tenodesis, subacromial decompression and all associated procedures.    Plan will be for right shoulder arthroscopy, rotator cuff debridement versus repair, possible biceps tenodesis, subacromial decompression, and all associated procedures.  I reviewed risks benefits and alternatives the procedure with risks including not limited to neurovascular damage, bleeding, infection, chronic pain, re-tear rotator cuff, failure of healing rotator cuff, loss of motion, weakness, stiffness, instability, biceps sag, DVT, pulmonary embolus, death, stroke, complex regional pain  syndrome, myocardial infarction, need for additional procedures. He understood all these had all questions answered.  Patient verbally consented to proceed with surgery.  No guarantees were given regarding results of surgery.  We will have patient medically optimized by primary care physician and proceed with surgery at next available date.    Patient denies history of DVT or pulmonary embolus, denies cardiac history, he is not diabetic.      Denny Hairston was in agreement with plan and had all questions answered.     Orders:  No orders of the defined types were placed in this encounter.      Medications:  No orders of the defined types were placed in this encounter.      Followup:  No follow-ups on file.    Diagnoses and all orders for this visit:    1. Traumatic tear of right rotator cuff, unspecified tear extent, initial encounter (Primary)                  Dictated utilizing Dragon dictation     Patient or patient representative verbalized consent for the use of Ambient Listening during the visit with  Rohan Shaikh MD for chart documentation. 2/19/2025  08:58 EST

## 2025-02-26 ENCOUNTER — TELEPHONE (OUTPATIENT)
Dept: ORTHOPEDIC SURGERY | Facility: CLINIC | Age: 55
End: 2025-02-26
Payer: COMMERCIAL

## 2025-02-26 PROBLEM — M75.21 BICEPS TENDINITIS OF RIGHT UPPER EXTREMITY: Status: ACTIVE | Noted: 2025-02-19

## 2025-02-26 PROBLEM — S46.011A TRAUMATIC TEAR OF RIGHT ROTATOR CUFF: Status: ACTIVE | Noted: 2025-02-19

## 2025-02-26 PROBLEM — M75.51 SUBACROMIAL BURSITIS OF RIGHT SHOULDER JOINT: Status: ACTIVE | Noted: 2025-02-19

## 2025-02-26 RX ORDER — ACETAMINOPHEN 325 MG/1
1000 TABLET ORAL ONCE
OUTPATIENT
Start: 2025-02-26 | End: 2025-02-26

## 2025-02-26 RX ORDER — PREGABALIN 150 MG/1
150 CAPSULE ORAL ONCE
OUTPATIENT
Start: 2025-02-26 | End: 2025-02-26

## 2025-02-26 RX ORDER — MELOXICAM 7.5 MG/1
15 TABLET ORAL ONCE
OUTPATIENT
Start: 2025-02-26 | End: 2025-02-26

## 2025-02-26 NOTE — TELEPHONE ENCOUNTER
Attempted to call patient about scheduling surgery. No answer. Voicemail box not set up. If patient calls back, please transfer to the office.

## 2025-03-03 ENCOUNTER — OFFICE VISIT (OUTPATIENT)
Dept: GASTROENTEROLOGY | Facility: CLINIC | Age: 55
End: 2025-03-03
Payer: COMMERCIAL

## 2025-03-03 VITALS
SYSTOLIC BLOOD PRESSURE: 116 MMHG | DIASTOLIC BLOOD PRESSURE: 68 MMHG | WEIGHT: 180 LBS | HEIGHT: 70 IN | BODY MASS INDEX: 25.77 KG/M2

## 2025-03-03 DIAGNOSIS — K21.9 GASTROESOPHAGEAL REFLUX DISEASE, UNSPECIFIED WHETHER ESOPHAGITIS PRESENT: ICD-10-CM

## 2025-03-03 DIAGNOSIS — K59.03 THERAPEUTIC OPIOID-INDUCED CONSTIPATION (OIC): Primary | ICD-10-CM

## 2025-03-03 DIAGNOSIS — T40.2X5A THERAPEUTIC OPIOID-INDUCED CONSTIPATION (OIC): Primary | ICD-10-CM

## 2025-03-03 PROCEDURE — 1160F RVW MEDS BY RX/DR IN RCRD: CPT

## 2025-03-03 PROCEDURE — 1159F MED LIST DOCD IN RCRD: CPT

## 2025-03-03 PROCEDURE — 99214 OFFICE O/P EST MOD 30 MIN: CPT

## 2025-03-03 RX ORDER — VONOPRAZAN FUMARATE 26.72 MG/1
20 TABLET ORAL DAILY
Qty: 30 TABLET | Refills: 1 | Status: SHIPPED | OUTPATIENT
Start: 2025-03-03

## 2025-03-03 RX ORDER — OMEPRAZOLE 20 MG/1
20 CAPSULE, DELAYED RELEASE ORAL DAILY
COMMUNITY
End: 2025-03-03

## 2025-03-03 RX ORDER — FAMOTIDINE 20 MG/1
20 TABLET, FILM COATED ORAL 2 TIMES DAILY PRN
Qty: 60 TABLET | Refills: 3 | Status: SHIPPED | OUTPATIENT
Start: 2025-03-03

## 2025-03-03 NOTE — PROGRESS NOTES
PATIENT INFORMATION  Denny Hairston       - 1970    CHIEF COMPLAINT  Chief Complaint   Patient presents with    Heartburn    Abdominal Pain    Diarrhea    Nausea       HISTORY OF PRESENT ILLNESS    Here today for GERD follow-up     Per LOV: HB reported daily to every other day, with daily 40 mg omeprazole and 20 mg OTC dose at night to control sx. Stomach is doing well when taking voquezna, but has only taken it for 2 months, then it was not refilled. Currently on omeprazole with daily HB, Was taking omeprazole with voquezna. Will resume voquezna, discontinue omeprazole, ok to use famotidine. 3-4 NSAIDs, using tylenol PRN. Reviewed eating small, frequent meals, and avoiding high acid foods. TODAY: Again did not get voquezna, taking 20-40 mg pantoprazole daily and HB after every meal.     Flares once a month or so, start sweating and will have N/V for a day, mostly just dry heaving. Accidentally forgot prozac for a month because did not realize was in the car, panic attacks and dizziness and now doing better back on meds.     Bowels are doing ok. BRAD managed on methadone, working on coming down. Bowels are moving most days, easy and well controlled, occasional constipation, not having regular issues.    2024 EGD for GERD with a medium sized HH, gastritis, neg for HP, with rare IM, no esophagitis.   2024 Colonoscopy with 5 TA, 1 TVA all subcentimeter recommend 1 yr repeat with plenvue.    Rotator cuff repair           REVIEWED PERTINENT RESULTS/ LABS  Lab Results   Component Value Date    CASEREPORT  2024     Surgical Pathology Report                         Case: [id number]                                  Authorizing Provider:  Rainer Doty MD       Collected:           2024 10:33 AM          Ordering Location:     [facility]   Received:            2024 12:46 PM                                 OR                                                                            Pathologist:           Eugenia Frank MD                                                    Specimens:   1) - Gastric, Antrum, bx, r/o h pylori                                                              2) - Large Intestine, Right / Ascending Colon, x2                                                   3) - Large Intestine, Transverse Colon, x2                                                          4) - Large Intestine, Left / Descending Colon                                                       5) - Large Intestine, Sigmoid Colon                                                        FINALDX  08/22/2024     1.  Stomach, antrum, biopsy:   -Mild gastropathy with rare focus of intestinal metaplasia.   -Negative for Helicobacter on routine staining.   -Negative for dysplasia.    2.  Ascending colon, biopsy:   -Tubular adenomas.   -Negative for high-grade dysplasia.    3.  Transverse colon, biopsies:   -Tubular adenomas.   -Negative for high-grade dysplasia.    4.  Descending colon, biopsies:   -Tubular adenomas.   -Negative for high-grade dysplasia.    5.  Sigmoid colon, biopsy:   -Tubulovillous adenoma.   -Negative for high-grade dysplasia.    [facility]       Lab Results   Component Value Date    HGB 9.7 (L) 07/12/2024    MCV 77.3 (L) 07/12/2024     07/12/2024    ALT 29 07/12/2024    AST 47 (H) 07/12/2024    HGBA1C 6.2 (H) 07/30/2024    TRIG 146 03/04/2024    FERRITIN 26 (L) 07/30/2024    TIBC 429 07/30/2024      No results found.    REVIEW OF SYSTEMS  Review of Systems      ACTIVE PROBLEMS  Patient Active Problem List    Diagnosis     Traumatic tear of right rotator cuff [S46.011A]     Subacromial bursitis of right shoulder joint [M75.51]     Biceps tendinitis of right upper extremity [M75.21]     Primary insomnia [F51.01]     Cocaine use disorder [F14.10]     Gastroesophageal reflux disease [K21.9]     Methamphetamine abuse [F15.10]     Tobacco dependence syndrome [F17.200]     Adjustment  insomnia [F51.02]     Obstructive sleep apnea [G47.33]     Positive colorectal cancer screening using Cologuard test [R19.5]     Recurrent major depressive disorder, in full remission [F33.42]     Heroin use disorder, severe, in early remission [F11.21]          PAST MEDICAL HISTORY  Past Medical History:   Diagnosis Date    Depression     Drug abuse     GERD (gastroesophageal reflux disease)     RENE (obstructive sleep apnea)     wears cpap         SURGICAL HISTORY  Past Surgical History:   Procedure Laterality Date    COLONOSCOPY W/ POLYPECTOMY N/A 8/22/2024    Procedure: COLONOSCOPY WITH POLYPECTOMY;  Surgeon: Rainer Doty MD;  Location: [facility];  Service: Gastroenterology;  Laterality: N/A;  diverticulosis, ascending polyp x2, transverse polyp x2, descending polyp, sigmoid polyp    ENDOSCOPY N/A 8/22/2024    Procedure: ESOPHAGOGASTRODUODENOSCOPY WITH BIOPSY;  Surgeon: Rainer Doty MD;  Location: [facility];  Service: Gastroenterology;  Laterality: N/A;  gastritis         FAMILY HISTORY  Family History   Problem Relation Age of Onset    Cancer Mother     Cancer Father     Cirrhosis Father     Liver disease Sister     Cancer Brother          SOCIAL HISTORY  Social History     Occupational History     Employer: UNKNOWN EMPLOYER    Occupation: [employer]     Comment: [employer]   Tobacco Use    Smoking status: Former     Current packs/day: 0.25     Types: Cigarettes     Passive exposure: Current    Smokeless tobacco: Former   Vaping Use    Vaping status: Every Day    Substances: Nicotine, Flavoring    Devices: Pre-filled pod    Passive vaping exposure: Yes   Substance and Sexual Activity    Alcohol use: Not Currently    Drug use: Yes     Comment: fentanyl, heroin - last use 07/2023    Sexual activity: Defer         CURRENT MEDICATIONS    Current Outpatient Medications:     busPIRone (BUSPAR) 5 MG tablet, Take 1 tablet by mouth 3 (Three) Times a Day., Disp: 90 tablet, Rfl: 2    famotidine  "(Pepcid) 20 MG tablet, Take 1 tablet by mouth 2 (Two) Times a Day As Needed for Heartburn., Disp: 60 tablet, Rfl: 3    ferrous sulfate 325 (65 FE) MG tablet, Take 1 tablet by mouth Daily With Breakfast., Disp: 30 tablet, Rfl: 2    FLUoxetine (PROzac) 20 MG capsule, TAKE 1 CAPSULE BY MOUTH DAILY, Disp: 30 capsule, Rfl: 2    methadone (DOLOPHINE) 10 MG tablet, Take 12 tablets by mouth Daily,, Disp: , Rfl:     naloxone (NARCAN) 4 MG/0.1ML nasal spray, Call 911. Don't prime. [place] in 1 nostril for overdose. Repeat in 2-3 minutes in other nostril if no or minimal breathing/responsiveness., Disp: 2 each, Rfl: 0    naproxen (NAPROSYN) 500 MG tablet, Take 1 tablet by mouth 2 (Two) Times a Day With Meals., Disp: 60 tablet, Rfl: 0    QUEtiapine (SEROquel) 50 MG tablet, Take 1 tablet by mouth Every Night., Disp: 30 tablet, Rfl: 2    Vonoprazan Fumarate (Voquezna) 20 MG tablet, Take 1 tablet by mouth Daily., Disp: 30 tablet, Rfl: 1    ALLERGIES  Patient has no known allergies.    VITALS  Vitals:    03/03/25 1530   BP: 116/68   BP Location: Left arm   Patient Position: Sitting   Cuff Size: Large Adult   Weight: 81.6 kg (180 lb)   Height: 177.8 cm (70\")       PHYSICAL EXAM  Debilities/Disabilities Identified: None  Emotional Behavior: Appropriate  Wt Readings from Last 3 Encounters:   03/03/25 81.6 kg (180 lb)   02/19/25 89.4 kg (197 lb)   01/08/25 89.7 kg (197 lb 12.8 oz)     Ht Readings from Last 1 Encounters:   03/03/25 177.8 cm (70\")     Body mass index is 25.83 kg/m².  Physical Exam  Constitutional:       General: He is not in acute distress.     Appearance: Normal appearance. He is not ill-appearing.   HENT:      Head: Normocephalic and atraumatic.      Mouth/Throat:      Mouth: Mucous membranes are moist.      Pharynx: No posterior oropharyngeal erythema.   Eyes:      General: No scleral icterus.  Cardiovascular:      Rate and Rhythm: Normal rate and regular rhythm.      Heart sounds: Normal heart sounds.   Pulmonary:      " Effort: Pulmonary effort is normal.      Breath sounds: Normal breath sounds.   Abdominal:      General: Abdomen is flat. Bowel sounds are normal. There is no distension.      Palpations: Abdomen is soft. There is no mass.      Tenderness: There is no abdominal tenderness. There is no guarding or rebound. Negative signs include Palmer's sign.      Hernia: No hernia is present.   Musculoskeletal:      Cervical back: Neck supple.   Skin:     General: Skin is warm.      Capillary Refill: Capillary refill takes less than 2 seconds.   Neurological:      General: No focal deficit present.      Mental Status: He is alert and oriented to person, place, and time.   Psychiatric:         Mood and Affect: Mood normal.         Behavior: Behavior normal.         Thought Content: Thought content normal.         Judgment: Judgment normal.         CLINICAL DATA REVIEWED   reviewed previous lab results and integrated with today's visit, reviewed notes from other physicians and/or last GI encounter, reviewed previous endoscopy results and available photos, reviewed surgical pathology results from previous biopsies    ASSESSMENT  Diagnoses and all orders for this visit:    Therapeutic opioid-induced constipation (OIC)    Gastroesophageal reflux disease, unspecified whether esophagitis present  -     Vonoprazan Fumarate (Voquezna) 20 MG tablet; Take 1 tablet by mouth Daily.    Other orders  -     Discontinue: omeprazole (priLOSEC) 20 MG capsule; Take 1 capsule by mouth Daily.  -     famotidine (Pepcid) 20 MG tablet; Take 1 tablet by mouth 2 (Two) Times a Day As Needed for Heartburn.          PLAN    Start daily voquenza, reviewed GP diet  OK to use miralax PRN    Return in about 3 months (around 6/3/2025).    I have discussed the above plan with the patient.  They verbalize understanding and are in agreement with the plan.  They have been advised to contact the office for any questions, concerns, or changes related to their  health.

## 2025-03-03 NOTE — TELEPHONE ENCOUNTER
Called to give patient his PAT appt; thurs 3/13 @ 2:00 pm no answer/no voicemail. Will send mychart message and reminder via mail.

## 2025-03-04 ENCOUNTER — OFFICE VISIT (OUTPATIENT)
Dept: FAMILY MEDICINE CLINIC | Facility: CLINIC | Age: 55
End: 2025-03-04
Payer: COMMERCIAL

## 2025-03-04 ENCOUNTER — PATIENT ROUNDING (BHMG ONLY) (OUTPATIENT)
Dept: FAMILY MEDICINE CLINIC | Facility: CLINIC | Age: 55
End: 2025-03-04
Payer: COMMERCIAL

## 2025-03-04 VITALS
OXYGEN SATURATION: 99 % | RESPIRATION RATE: 16 BRPM | BODY MASS INDEX: 26.05 KG/M2 | WEIGHT: 182 LBS | DIASTOLIC BLOOD PRESSURE: 74 MMHG | SYSTOLIC BLOOD PRESSURE: 100 MMHG | HEIGHT: 70 IN | HEART RATE: 84 BPM

## 2025-03-04 DIAGNOSIS — Z01.818 PRE-OPERATIVE CLEARANCE: Primary | ICD-10-CM

## 2025-03-04 DIAGNOSIS — F11.21: ICD-10-CM

## 2025-03-04 DIAGNOSIS — S46.011D TRAUMATIC TEAR OF RIGHT ROTATOR CUFF, UNSPECIFIED TEAR EXTENT, SUBSEQUENT ENCOUNTER: ICD-10-CM

## 2025-03-04 NOTE — PROGRESS NOTES
A [employer] message has been sent to the patient for PATIENT ROUNDING with [facility]

## 2025-03-04 NOTE — PROGRESS NOTES
Jens Angeles DO  [facility] PRIMARY [facility]  [facility]  [place] [id number] [phone]    Subjective      Name Denny Hairston MRN [id number]    1970 AGE/SEX 55 y.o. / male      Chief Complaint Chief Complaint   Patient presents with    Med Management     6 month check up     Shoulder Pain     Has schedule surgery 3/21          Visit History for  2025    Denny Hairston is a 55 y.o. male who presented today for Med Management (6 month check up ) and Shoulder Pain (Has schedule surgery 3/21 )       History of Present Illness  The patient presents for preoperative clearance for right shoulder surgery.    He is scheduled for a right shoulder surgery on 2025, which will involve the repair of a flap tear. He has been on a stable dose of methadone since 2024 but has recently initiated a gradual tapering process. He is currently on a regimen of 120 mg of methadone.  He expresses concern about the potential need to discontinue methadone prior to the surgery and the subsequent resumption of the medication. He also questions whether he will be able to return to his current dose or if a lower dose will be required. He anticipates experiencing significant pain postoperatively. He reports no cognitive impairment and believes he is capable of making informed decisions regarding his treatment plan.         Medications and Allergies   Current Outpatient Medications   Medication Instructions    busPIRone (BUSPAR) 5 mg, Oral, 3 Times Daily    famotidine (PEPCID) 20 mg, Oral, 2 Times Daily PRN    ferrous sulfate 325 mg, Oral, Daily With Breakfast    FLUoxetine (PROZAC) 20 mg, Oral, Daily    methadone (DOLOPHINE) 120 mg, Daily    naloxone (NARCAN) 4 MG/0.1ML nasal spray Call 911. Don't prime. [place] in 1 nostril for overdose. Repeat in 2-3 minutes in other nostril if no or minimal breathing/responsiveness.    naproxen (NAPROSYN) 500 mg, Oral, 2 Times Daily With Meals    QUEtiapine  "(SEROQUEL) 50 mg, Oral, Nightly    Voquezna 20 mg, Oral, Daily     No Known Allergies   I have reviewed the above medications and allergies     Objective:      Vitals Vitals:    03/04/25 1535   BP: 100/74   BP Location: Left arm   Patient Position: Sitting   Cuff Size: Adult   Pulse: 84   Resp: 16   SpO2: 99%   Weight: 82.6 kg (182 lb)   Height: 177.8 cm (70\")     Body mass index is 26.11 kg/m².    Physical Exam  Vitals reviewed.   Constitutional:       General: He is not in acute distress.     Appearance: He is not ill-appearing.   Cardiovascular:      Rate and Rhythm: Normal rate and regular rhythm.   Pulmonary:      Effort: Pulmonary effort is normal.      Breath sounds: Normal breath sounds.   Psychiatric:         Mood and Affect: Mood normal.         Behavior: Behavior normal.         Thought Content: Thought content normal.         Judgment: Judgment normal.          Physical Exam       Results       Assessment/Plan   Issues Addressed/ Plan   Diagnosis Plan   1. Pre-operative clearance        2. Traumatic tear of right rotator cuff, unspecified tear extent, subsequent encounter        3. Heroin use disorder, severe, in early remission           Assessment & Plan  1. Preoperative clearance for right shoulder surgery.  He is scheduled for a right shoulder surgery on 03/21/2025. The procedure will include a right shoulder arthroscopy, rotator cuff debridement versus repair, potential biceps tenodesis, and subacromial decompression. He has been on methadone since July 2020 and is currently tapering down his dose. The patient is currently on 120 mg of methadone. He was previously on Suboxone. He is advised to use the lowest effective dose of methadone post-surgery. The patient was educated on the procedure. The patient was educated on the pain management post surgery. A summary of the upcoming surgery will be provided for his reference to bring to addition recovery clinic. He is instructed to inform the addition " recovery clinic about upcoming surgery and plan.  Patient will ask if they have a set protocol for surgery and help with transition if needed.  If he experiences withdrawal symptoms medication can be provided to manage symptoms.  He is also advised to consult with the addiction medicine team regarding the best course of action for managing his methadone dose around the time of surgery and after surgery.  Otherwise, patient should be medically optimized for surgery at this time.     There are no Patient Instructions on file for this visit.   Follow up  recommended Return if symptoms worsen or fail to improve.   - Dragon voice recognition software was utilized to complete this chart.  Every reasonable attempt was made to edit and correct the text, however some incorrect words may remain.   Jens Angeles DO    Patient or patient representative verbalized consent for the use of Ambient Listening during the visit with  Jens Angeles DO for chart documentation. 3/5/2025  16:29 EST

## 2025-03-10 ENCOUNTER — TELEPHONE (OUTPATIENT)
Dept: GASTROENTEROLOGY | Facility: CLINIC | Age: 55
End: 2025-03-10
Payer: COMMERCIAL

## 2025-03-10 NOTE — TELEPHONE ENCOUNTER
Approved today by [place] [employer] 2017  The request has been approved. The authorization is effective from 03/10/2025 to 05/05/2025, as long as the member is enrolled in their current health plan. A written notification letter will follow with additional details.  Effective Date: 3/9/2025  Authorization Expiration Date: 5/4/2025

## 2025-03-13 ENCOUNTER — PRE-ADMISSION TESTING (OUTPATIENT)
Dept: PREADMISSION TESTING | Facility: HOSPITAL | Age: 55
End: 2025-03-13
Payer: COMMERCIAL

## 2025-03-13 VITALS
BODY MASS INDEX: 24.92 KG/M2 | WEIGHT: 178 LBS | SYSTOLIC BLOOD PRESSURE: 119 MMHG | RESPIRATION RATE: 20 BRPM | HEIGHT: 71 IN | DIASTOLIC BLOOD PRESSURE: 81 MMHG | OXYGEN SATURATION: 96 % | HEART RATE: 84 BPM

## 2025-03-13 DIAGNOSIS — M75.21 BICEPS TENDINITIS OF RIGHT UPPER EXTREMITY: ICD-10-CM

## 2025-03-13 DIAGNOSIS — S46.011A TRAUMATIC TEAR OF RIGHT ROTATOR CUFF, UNSPECIFIED TEAR EXTENT, INITIAL ENCOUNTER: ICD-10-CM

## 2025-03-13 DIAGNOSIS — M75.51 SUBACROMIAL BURSITIS OF RIGHT SHOULDER JOINT: ICD-10-CM

## 2025-03-13 LAB
ANION GAP SERPL CALCULATED.3IONS-SCNC: 10.8 MMOL/L (ref 5–15)
APTT PPP: 27.5 SECONDS (ref 24.3–38.1)
BASOPHILS # BLD AUTO: 0.03 10*3/MM3 (ref 0–0.2)
BASOPHILS NFR BLD AUTO: 0.5 % (ref 0–1.5)
BUN SERPL-MCNC: 11 MG/DL (ref 6–20)
BUN/CREAT SERPL: 13.1 (ref 7–25)
CALCIUM SPEC-SCNC: 9.1 MG/DL (ref 8.6–10.5)
CHLORIDE SERPL-SCNC: 104 MMOL/L (ref 98–107)
CO2 SERPL-SCNC: 25.2 MMOL/L (ref 22–29)
CREAT SERPL-MCNC: 0.84 MG/DL (ref 0.76–1.27)
DEPRECATED RDW RBC AUTO: 45.1 FL (ref 37–54)
EGFRCR SERPLBLD CKD-EPI 2021: 102.3 ML/MIN/1.73
EOSINOPHIL # BLD AUTO: 0.14 10*3/MM3 (ref 0–0.4)
EOSINOPHIL NFR BLD AUTO: 2.3 % (ref 0.3–6.2)
ERYTHROCYTE [DISTWIDTH] IN BLOOD BY AUTOMATED COUNT: 14.3 % (ref 12.3–15.4)
GLUCOSE SERPL-MCNC: 89 MG/DL (ref 65–99)
HBA1C MFR BLD: 5.74 % (ref 4.8–5.6)
HCT VFR BLD AUTO: 41.3 % (ref 37.5–51)
HGB BLD-MCNC: 13.5 G/DL (ref 13–17.7)
IMM GRANULOCYTES # BLD AUTO: 0.03 10*3/MM3 (ref 0–0.05)
IMM GRANULOCYTES NFR BLD AUTO: 0.5 % (ref 0–0.5)
INR PPP: 0.99 (ref 0.9–1.1)
LYMPHOCYTES # BLD AUTO: 1.47 10*3/MM3 (ref 0.7–3.1)
LYMPHOCYTES NFR BLD AUTO: 24.1 % (ref 19.6–45.3)
MCH RBC QN AUTO: 28.2 PG (ref 26.6–33)
MCHC RBC AUTO-ENTMCNC: 32.7 G/DL (ref 31.5–35.7)
MCV RBC AUTO: 86.4 FL (ref 79–97)
MONOCYTES # BLD AUTO: 0.51 10*3/MM3 (ref 0.1–0.9)
MONOCYTES NFR BLD AUTO: 8.4 % (ref 5–12)
NEUTROPHILS NFR BLD AUTO: 3.92 10*3/MM3 (ref 1.7–7)
NEUTROPHILS NFR BLD AUTO: 64.2 % (ref 42.7–76)
NRBC BLD AUTO-RTO: 0 /100 WBC (ref 0–0.2)
PLATELET # BLD AUTO: 224 10*3/MM3 (ref 140–450)
PMV BLD AUTO: 9 FL (ref 6–12)
POTASSIUM SERPL-SCNC: 3.9 MMOL/L (ref 3.5–5.2)
PROTHROMBIN TIME: 13.5 SECONDS (ref 12.1–15)
RBC # BLD AUTO: 4.78 10*6/MM3 (ref 4.14–5.8)
SODIUM SERPL-SCNC: 140 MMOL/L (ref 136–145)
WBC NRBC COR # BLD AUTO: 6.1 10*3/MM3 (ref 3.4–10.8)

## 2025-03-13 PROCEDURE — 85025 COMPLETE CBC W/AUTO DIFF WBC: CPT

## 2025-03-13 PROCEDURE — 36415 COLL VENOUS BLD VENIPUNCTURE: CPT

## 2025-03-13 PROCEDURE — 85610 PROTHROMBIN TIME: CPT

## 2025-03-13 PROCEDURE — 80048 BASIC METABOLIC PNL TOTAL CA: CPT

## 2025-03-13 PROCEDURE — 85730 THROMBOPLASTIN TIME PARTIAL: CPT

## 2025-03-13 PROCEDURE — 83036 HEMOGLOBIN GLYCOSYLATED A1C: CPT

## 2025-03-13 NOTE — PAT
Pt here for PAT visit.  Pre-op tests completed, chg soap given, and instructions reviewed.  Instructed clears until 2 hrs prior to arrival time, voiced understanding. Handout for Benzol Peroxide given and reviewed with pt

## 2025-03-13 NOTE — DISCHARGE INSTRUCTIONS
PRE-ADMISSION TESTING INSTRUCTIONS FOR ADULTS    Take these medications the morning of surgery with a small sip of water: methadone,   Prozac, famotidine      Do not take any insulin or diabetes medications the morning of surgery.      No aspirin, advil, aleve, ibuprofen, naproxen, diet pills, decongestants, or herbal/vitamins for a week prior to surgery.       Tylenol/Acetaminophen is okay to take if needed.    General Instructions:    DO NOT EAT SOLID FOOD AFTER MIDNIGHT THE NIGHT BEFORE SURGERY. No gum, mints, or hard candy after midnight the night before surgery.  You may drink clear liquids the day of surgery up until 2 hours before your arrival time.  Clear liquids are liquids you can see through. Nothing RED in color.    Plain water    Sports drinks      Gelatin (Jell-O)  Fruit juices without pulp such as white grape juice and apple juice  Popsicles that contain no fruit or yogurt  Tea or coffee (no cream or milk added)    It is beneficial for you to have a clear drink that contains carbohydrates 2 hours before your arrival time.  We suggest a 20 ounce bottle of Gatorade or Powerade for non-diabetic patients or a 20 ounce bottle of Gatorade Zero or Powerade Zero for diabetic patients.     Patients who avoid smoking, chewing tobacco and alcohol for 4 weeks prior to surgery have a reduced risk of post-operative complications.  If at all possible, quit smoking as many days before surgery as you can.    Do not smoke, use chewing tobacco or drink alcohol the day of surgery    Bring your C-PAP/ BI-PAP machine if you use one.  Wear clean comfortable clothes.  Do not wear contact lenses, lotion, deodorant, or make-up.  Bring a case for your glasses if applicable. You may brush your teeth the morning of surgery.  You may wear dentures/partials, do not put adhesive/glue on them.  Leave all other jewelry and valuables at home.      Preventing a Surgical Site Infection:    Shower the night before and on the morning of  surgery using the chlorhexidine soap you were given.  Use a clean washcloth with the soap.  Place clean sheets on your bed after showering the night before surgery. Do not use the CHG soap on your hair, face, or private areas. Wash your body gently for five (5) minutes. Do not scrub your skin.  Dry with a clean towel and dress in clean clothing.  Do not shave the surgical area for 10 days-2 weeks prior to surgery  because the razor can irritate skin and make it easier to develop an infection.  Make sure you, your family, and all healthcare providers clean their hands with soap and water or an alcohol based hand  before caring for you or your wound.      Day of surgery:    Your surgeon’s office will advise you of your arrival time for the day of surgery.    Upon arrival, a Pre-op nurse and Anesthesia provider will review your health history, obtain vital signs, and answer questions you may have. The anesthesia provider will also discuss the type of anesthesia that will be needed for your procedure, which may include general anesthesia. The only belongings needed at this time will be your home medications and if applicable your C-PAP/BI-PAP machine.  If you are staying overnight your family can leave the rest of your belongings in the car and bring them to your room later.  A Pre-op nurse will start an IV and you may receive medication in preparation for surgery, including something to help you relax.  Your family will be able to see you in the Pre-op area.  While you are in surgery your family should notify the waiting room  if they leave the waiting room area and provide a contact phone number.    IF you have any questions, you can call the Pre-Admission Department at [phone] or your surgeon's office.  Notify your surgeon if  you become sick, have a fever, productive cough, or cannot be here the day of surgery    Please be aware that surgery does come with discomfort.  We want to make  every effort to control your discomfort so please discuss any uncontrolled symptoms with your nurse.   Your doctor will most likely have prescribed pain medications.      If you are going home after surgery, you will receive individualized written care instructions before being discharged.  A responsible adult (over the age of 18) must drive you to and from the hospital on the day of your surgery and stay with you for 24 hours after anesthesia.    If you are staying overnight following surgery, you will be transported to your hospital room following the recovery period.  [facility] has all private rooms.    You may receive a survey regarding the care you received. Your feedback is very important and will be used to collect the necessary data to help us to continue to provide excellent care.     Deductibles and co-payments are collected on the day of service. Please be prepared to pay the required co-pay, deductible or deposit on the day of service as defined by your plan.

## 2025-03-14 ENCOUNTER — ANESTHESIA EVENT (OUTPATIENT)
Dept: PERIOP | Facility: HOSPITAL | Age: 55
End: 2025-03-14
Payer: COMMERCIAL

## 2025-03-20 ENCOUNTER — HOSPITAL ENCOUNTER (OUTPATIENT)
Dept: ULTRASOUND IMAGING | Facility: HOSPITAL | Age: 55
Discharge: HOME | End: 2025-03-20
Payer: COMMERCIAL

## 2025-03-21 ENCOUNTER — APPOINTMENT (OUTPATIENT)
Dept: ULTRASOUND IMAGING | Facility: HOSPITAL | Age: 55
End: 2025-03-21
Payer: COMMERCIAL

## 2025-03-21 ENCOUNTER — HOSPITAL ENCOUNTER (OUTPATIENT)
Facility: HOSPITAL | Age: 55
Setting detail: HOSPITAL OUTPATIENT SURGERY
Discharge: HOME | End: 2025-03-21
Attending: ORTHOPAEDIC SURGERY | Admitting: ORTHOPAEDIC SURGERY
Payer: COMMERCIAL

## 2025-03-21 ENCOUNTER — ANESTHESIA (OUTPATIENT)
Dept: PERIOP | Facility: HOSPITAL | Age: 55
End: 2025-03-21
Payer: COMMERCIAL

## 2025-03-21 VITALS
BODY MASS INDEX: 24.69 KG/M2 | RESPIRATION RATE: 18 BRPM | DIASTOLIC BLOOD PRESSURE: 65 MMHG | OXYGEN SATURATION: 92 % | TEMPERATURE: 97.6 F | HEART RATE: 85 BPM | WEIGHT: 177 LBS | SYSTOLIC BLOOD PRESSURE: 93 MMHG

## 2025-03-21 DIAGNOSIS — M75.21 BICEPS TENDINITIS OF RIGHT UPPER EXTREMITY: ICD-10-CM

## 2025-03-21 DIAGNOSIS — S46.011A TRAUMATIC TEAR OF RIGHT ROTATOR CUFF, UNSPECIFIED TEAR EXTENT, INITIAL ENCOUNTER: ICD-10-CM

## 2025-03-21 DIAGNOSIS — M75.51 SUBACROMIAL BURSITIS OF RIGHT SHOULDER JOINT: ICD-10-CM

## 2025-03-21 LAB
AMPHET+METHAMPHET UR QL: NEGATIVE
AMPHETAMINES UR QL: NEGATIVE
BARBITURATES UR QL SCN: NEGATIVE
BENZODIAZ UR QL SCN: NEGATIVE
BUPRENORPHINE SERPL-MCNC: NEGATIVE NG/ML
CANNABINOIDS SERPL QL: POSITIVE
COCAINE UR QL: NEGATIVE
METHADONE UR QL SCN: POSITIVE
OPIATES UR QL: NEGATIVE
OXYCODONE UR QL SCN: NEGATIVE
PCP UR QL SCN: NEGATIVE
TRICYCLICS UR QL SCN: NEGATIVE

## 2025-03-21 PROCEDURE — 25810000003 SODIUM CHLORIDE PER 500 ML: Performed by: ORTHOPAEDIC SURGERY

## 2025-03-21 PROCEDURE — 25010000002 MIDAZOLAM PER 1MG: Performed by: NURSE ANESTHETIST, CERTIFIED REGISTERED

## 2025-03-21 PROCEDURE — 29827 SHO ARTHRS SRG RT8TR CUF RPR: CPT | Performed by: ORTHOPAEDIC SURGERY

## 2025-03-21 PROCEDURE — 25010000002 ALBUMIN HUMAN 5% PER 50 ML: Performed by: NURSE ANESTHETIST, CERTIFIED REGISTERED

## 2025-03-21 PROCEDURE — 25010000002 CEFAZOLIN PER 500 MG: Performed by: ORTHOPAEDIC SURGERY

## 2025-03-21 PROCEDURE — 25010000002 BUPIVACAINE (PF) 0.5 % SOLUTION: Performed by: NURSE ANESTHETIST, CERTIFIED REGISTERED

## 2025-03-21 PROCEDURE — 25010000002 LIDOCAINE PF 2% 2 % SOLUTION: Performed by: NURSE ANESTHETIST, CERTIFIED REGISTERED

## 2025-03-21 PROCEDURE — 80306 DRUG TEST PRSMV INSTRMNT: CPT | Performed by: ORTHOPAEDIC SURGERY

## 2025-03-21 PROCEDURE — C1713 ANCHOR/SCREW BN/BN,TIS/BN: HCPCS | Performed by: ORTHOPAEDIC SURGERY

## 2025-03-21 PROCEDURE — 25810000003 LACTATED RINGERS PER 1000 ML: Performed by: NURSE ANESTHETIST, CERTIFIED REGISTERED

## 2025-03-21 PROCEDURE — 25010000002 ONDANSETRON PER 1 MG: Performed by: NURSE ANESTHETIST, CERTIFIED REGISTERED

## 2025-03-21 PROCEDURE — 25010000002 EPINEPHRINE PER 0.1 MG: Performed by: ORTHOPAEDIC SURGERY

## 2025-03-21 PROCEDURE — 25010000002 SUCCINYLCHOLINE PER 20 MG: Performed by: ANESTHESIOLOGY

## 2025-03-21 PROCEDURE — 25010000002 FENTANYL CITRATE (PF) 50 MCG/ML SOLUTION: Performed by: ANESTHESIOLOGY

## 2025-03-21 PROCEDURE — 25010000002 DEXAMETHASONE PER 1 MG: Performed by: NURSE ANESTHETIST, CERTIFIED REGISTERED

## 2025-03-21 PROCEDURE — 25010000002 SUGAMMADEX 200 MG/2ML SOLUTION: Performed by: NURSE ANESTHETIST, CERTIFIED REGISTERED

## 2025-03-21 PROCEDURE — P9041 ALBUMIN (HUMAN),5%, 50ML: HCPCS | Performed by: NURSE ANESTHETIST, CERTIFIED REGISTERED

## 2025-03-21 PROCEDURE — 25010000002 GLYCOPYRROLATE 0.2 MG/ML SOLUTION: Performed by: ANESTHESIOLOGY

## 2025-03-21 PROCEDURE — 25010000002 LIDOCAINE 2% SOLUTION: Performed by: ANESTHESIOLOGY

## 2025-03-21 PROCEDURE — 29828 SHO ARTHRS SRG BICP TENODSIS: CPT | Performed by: ORTHOPAEDIC SURGERY

## 2025-03-21 PROCEDURE — 25010000002 PROPOFOL 200 MG/20ML EMULSION: Performed by: NURSE ANESTHETIST, CERTIFIED REGISTERED

## 2025-03-21 PROCEDURE — 25010000002 PHENYLEPHRINE 10 MG/ML SOLUTION: Performed by: ANESTHESIOLOGY

## 2025-03-21 DEVICE — ULTRABRAID 2 COBRAID 38 LENGTH SINGL
Type: IMPLANTABLE DEVICE | Site: SHOULDER | Status: FUNCTIONAL
Brand: ULTRABRAID

## 2025-03-21 DEVICE — ULTRATAPE 2MM BLUE 38 PKG OF 6: Type: IMPLANTABLE DEVICE | Site: SHOULDER | Status: FUNCTIONAL

## 2025-03-21 DEVICE — HEALICOIL PK 5.5 MM SUTURE ANCHOR                                    WITH THREE ULTRABRAID NO.2 SUTURES                                    BLUE, BLUE-COBRAID, COBRAID-BLACK STERILE
Type: IMPLANTABLE DEVICE | Site: SHOULDER | Status: FUNCTIONAL
Brand: HEALICOIL

## 2025-03-21 DEVICE — ULTRATAPE SUTURE COBRAID BLUE, 6                                    PER BOX
Type: IMPLANTABLE DEVICE | Site: SHOULDER | Status: FUNCTIONAL
Brand: ULTRATAPE

## 2025-03-21 DEVICE — MULTIFIX S-ULTRA 5.5MM KNOTLESS ANCHOR
Type: IMPLANTABLE DEVICE | Site: SHOULDER | Status: FUNCTIONAL
Brand: MULTIFIX

## 2025-03-21 RX ORDER — ACETAMINOPHEN 325 MG/1
1000 TABLET ORAL ONCE
Status: COMPLETED | OUTPATIENT
Start: 2025-03-21 | End: 2025-03-21

## 2025-03-21 RX ORDER — MIDAZOLAM HYDROCHLORIDE 2 MG/2ML
1 INJECTION, SOLUTION INTRAMUSCULAR; INTRAVENOUS
Status: COMPLETED | OUTPATIENT
Start: 2025-03-21 | End: 2025-03-21

## 2025-03-21 RX ORDER — SODIUM CHLORIDE, SODIUM LACTATE, POTASSIUM CHLORIDE, CALCIUM CHLORIDE 600; 310; 30; 20 MG/100ML; MG/100ML; MG/100ML; MG/100ML
100 INJECTION, SOLUTION INTRAVENOUS ONCE
Status: DISCONTINUED | OUTPATIENT
Start: 2025-03-21 | End: 2025-03-21 | Stop reason: HOSPADM

## 2025-03-21 RX ORDER — ONDANSETRON 2 MG/ML
4 INJECTION INTRAMUSCULAR; INTRAVENOUS ONCE AS NEEDED
Status: COMPLETED | OUTPATIENT
Start: 2025-03-21 | End: 2025-03-21

## 2025-03-21 RX ORDER — PREDNISONE 10 MG/1
10 TABLET ORAL DAILY
Qty: 14 TABLET | Refills: 0 | Status: SHIPPED | OUTPATIENT
Start: 2025-03-21 | End: 2025-04-04

## 2025-03-21 RX ORDER — SODIUM CHLORIDE, SODIUM LACTATE, POTASSIUM CHLORIDE, CALCIUM CHLORIDE 600; 310; 30; 20 MG/100ML; MG/100ML; MG/100ML; MG/100ML
9 INJECTION, SOLUTION INTRAVENOUS CONTINUOUS PRN
Status: DISCONTINUED | OUTPATIENT
Start: 2025-03-21 | End: 2025-03-21 | Stop reason: HOSPADM

## 2025-03-21 RX ORDER — BUPIVACAINE HYDROCHLORIDE 5 MG/ML
INJECTION, SOLUTION EPIDURAL; INTRACAUDAL
Status: COMPLETED | OUTPATIENT
Start: 2025-03-21 | End: 2025-03-21

## 2025-03-21 RX ORDER — ALBUMIN HUMAN 50 G/1000ML
SOLUTION INTRAVENOUS CONTINUOUS PRN
Status: DISCONTINUED | OUTPATIENT
Start: 2025-03-21 | End: 2025-03-21 | Stop reason: SURG

## 2025-03-21 RX ORDER — SUCCINYLCHOLINE CHLORIDE 20 MG/ML
INJECTION INTRAMUSCULAR; INTRAVENOUS AS NEEDED
Status: DISCONTINUED | OUTPATIENT
Start: 2025-03-21 | End: 2025-03-21 | Stop reason: SURG

## 2025-03-21 RX ORDER — LIDOCAINE HYDROCHLORIDE 20 MG/ML
INJECTION, SOLUTION INFILTRATION; PERINEURAL AS NEEDED
Status: DISCONTINUED | OUTPATIENT
Start: 2025-03-21 | End: 2025-03-21 | Stop reason: SURG

## 2025-03-21 RX ORDER — PROPOFOL 10 MG/ML
INJECTION, EMULSION INTRAVENOUS AS NEEDED
Status: DISCONTINUED | OUTPATIENT
Start: 2025-03-21 | End: 2025-03-21 | Stop reason: SURG

## 2025-03-21 RX ORDER — PREGABALIN 75 MG/1
75 CAPSULE ORAL 2 TIMES DAILY
Qty: 60 CAPSULE | Refills: 0 | Status: SHIPPED | OUTPATIENT
Start: 2025-03-21

## 2025-03-21 RX ORDER — PREGABALIN 75 MG/1
150 CAPSULE ORAL ONCE
Status: COMPLETED | OUTPATIENT
Start: 2025-03-21 | End: 2025-03-21

## 2025-03-21 RX ORDER — SENNOSIDES A AND B 8.6 MG/1
1 TABLET, FILM COATED ORAL NIGHTLY
Qty: 20 TABLET | Refills: 0 | Status: SHIPPED | OUTPATIENT
Start: 2025-03-21

## 2025-03-21 RX ORDER — ONDANSETRON 2 MG/ML
4 INJECTION INTRAMUSCULAR; INTRAVENOUS ONCE AS NEEDED
Status: DISCONTINUED | OUTPATIENT
Start: 2025-03-21 | End: 2025-03-21 | Stop reason: HOSPADM

## 2025-03-21 RX ORDER — SODIUM CHLORIDE 9 MG/ML
INJECTION, SOLUTION INTRAVENOUS AS NEEDED
Status: DISCONTINUED | OUTPATIENT
Start: 2025-03-21 | End: 2025-03-21 | Stop reason: HOSPADM

## 2025-03-21 RX ORDER — FAMOTIDINE 10 MG/ML
20 INJECTION, SOLUTION INTRAVENOUS
Status: COMPLETED | OUTPATIENT
Start: 2025-03-21 | End: 2025-03-21

## 2025-03-21 RX ORDER — FENTANYL CITRATE 50 UG/ML
INJECTION, SOLUTION INTRAMUSCULAR; INTRAVENOUS AS NEEDED
Status: DISCONTINUED | OUTPATIENT
Start: 2025-03-21 | End: 2025-03-21 | Stop reason: SURG

## 2025-03-21 RX ORDER — SODIUM CHLORIDE 0.9 % (FLUSH) 0.9 %
10 SYRINGE (ML) INJECTION AS NEEDED
Status: DISCONTINUED | OUTPATIENT
Start: 2025-03-21 | End: 2025-03-21 | Stop reason: HOSPADM

## 2025-03-21 RX ORDER — PHENYLEPHRINE HYDROCHLORIDE 10 MG/ML
INJECTION INTRAVENOUS AS NEEDED
Status: DISCONTINUED | OUTPATIENT
Start: 2025-03-21 | End: 2025-03-21 | Stop reason: SURG

## 2025-03-21 RX ORDER — GLYCOPYRROLATE 0.2 MG/ML
INJECTION INTRAMUSCULAR; INTRAVENOUS AS NEEDED
Status: DISCONTINUED | OUTPATIENT
Start: 2025-03-21 | End: 2025-03-21 | Stop reason: SURG

## 2025-03-21 RX ORDER — DEXAMETHASONE SODIUM PHOSPHATE 4 MG/ML
8 INJECTION, SOLUTION INTRA-ARTICULAR; INTRALESIONAL; INTRAMUSCULAR; INTRAVENOUS; SOFT TISSUE ONCE AS NEEDED
Status: COMPLETED | OUTPATIENT
Start: 2025-03-21 | End: 2025-03-21

## 2025-03-21 RX ORDER — EPHEDRINE SULFATE 50 MG/ML
INJECTION INTRAVENOUS AS NEEDED
Status: DISCONTINUED | OUTPATIENT
Start: 2025-03-21 | End: 2025-03-21 | Stop reason: SURG

## 2025-03-21 RX ORDER — SODIUM CHLORIDE 0.9 % (FLUSH) 0.9 %
10 SYRINGE (ML) INJECTION EVERY 12 HOURS SCHEDULED
Status: DISCONTINUED | OUTPATIENT
Start: 2025-03-21 | End: 2025-03-21 | Stop reason: HOSPADM

## 2025-03-21 RX ORDER — ROCURONIUM BROMIDE 10 MG/ML
INJECTION, SOLUTION INTRAVENOUS AS NEEDED
Status: DISCONTINUED | OUTPATIENT
Start: 2025-03-21 | End: 2025-03-21 | Stop reason: SURG

## 2025-03-21 RX ORDER — KETAMINE HYDROCHLORIDE 10 MG/ML
INJECTION, SOLUTION INTRAMUSCULAR; INTRAVENOUS AS NEEDED
Status: DISCONTINUED | OUTPATIENT
Start: 2025-03-21 | End: 2025-03-21 | Stop reason: SURG

## 2025-03-21 RX ORDER — DOXYCYCLINE 100 MG/1
100 CAPSULE ORAL 2 TIMES DAILY
Qty: 20 CAPSULE | Refills: 0 | Status: SHIPPED | OUTPATIENT
Start: 2025-03-21 | End: 2025-03-28 | Stop reason: SDUPTHER

## 2025-03-21 RX ORDER — OXYCODONE AND ACETAMINOPHEN 5; 325 MG/1; MG/1
1 TABLET ORAL EVERY 4 HOURS PRN
Qty: 42 TABLET | Refills: 0 | Status: SHIPPED | OUTPATIENT
Start: 2025-03-21

## 2025-03-21 RX ORDER — MELOXICAM 7.5 MG/1
15 TABLET ORAL ONCE
Status: COMPLETED | OUTPATIENT
Start: 2025-03-21 | End: 2025-03-21

## 2025-03-21 RX ORDER — DEXMEDETOMIDINE HYDROCHLORIDE 100 UG/ML
INJECTION, SOLUTION INTRAVENOUS
Status: COMPLETED | OUTPATIENT
Start: 2025-03-21 | End: 2025-03-21

## 2025-03-21 RX ORDER — ASPIRIN 81 MG/1
81 TABLET ORAL DAILY
Qty: 14 TABLET | Refills: 0 | Status: SHIPPED | OUTPATIENT
Start: 2025-03-21

## 2025-03-21 RX ORDER — LIDOCAINE HYDROCHLORIDE 20 MG/ML
INJECTION, SOLUTION EPIDURAL; INFILTRATION; INTRACAUDAL; PERINEURAL
Status: COMPLETED | OUTPATIENT
Start: 2025-03-21 | End: 2025-03-21

## 2025-03-21 RX ADMIN — DEXMEDETOMIDINE HYDROCHLORIDE 30 MCG: 100 INJECTION, SOLUTION INTRAVENOUS at 12:09

## 2025-03-21 RX ADMIN — ALBUMIN HUMAN: 0.05 INJECTION, SOLUTION INTRAVENOUS at 15:15

## 2025-03-21 RX ADMIN — BUPIVACAINE HYDROCHLORIDE 15 ML: 5 INJECTION, SOLUTION EPIDURAL; INTRACAUDAL; PERINEURAL at 12:09

## 2025-03-21 RX ADMIN — LIDOCAINE HYDROCHLORIDE 80 MG: 20 INJECTION, SOLUTION INFILTRATION; PERINEURAL at 13:28

## 2025-03-21 RX ADMIN — PHENYLEPHRINE HYDROCHLORIDE 50 MCG: 10 INJECTION INTRAVENOUS at 14:28

## 2025-03-21 RX ADMIN — MELOXICAM 15 MG: 7.5 TABLET ORAL at 11:15

## 2025-03-21 RX ADMIN — PHENYLEPHRINE HYDROCHLORIDE 50 MCG: 10 INJECTION INTRAVENOUS at 14:02

## 2025-03-21 RX ADMIN — MIDAZOLAM HYDROCHLORIDE 1 MG: 1 INJECTION, SOLUTION INTRAMUSCULAR; INTRAVENOUS at 12:03

## 2025-03-21 RX ADMIN — ROCURONIUM 15 MG: 50 INJECTION, SOLUTION INTRAVENOUS at 15:14

## 2025-03-21 RX ADMIN — KETAMINE HYDROCHLORIDE 20 MG: 10 INJECTION, SOLUTION INTRAMUSCULAR; INTRAVENOUS at 13:28

## 2025-03-21 RX ADMIN — EPHEDRINE SULFATE 10 MG: 50 INJECTION INTRAVENOUS at 13:49

## 2025-03-21 RX ADMIN — PROPOFOL 200 MG: 10 INJECTION, EMULSION INTRAVENOUS at 13:28

## 2025-03-21 RX ADMIN — EPHEDRINE SULFATE 10 MG: 50 INJECTION INTRAVENOUS at 13:43

## 2025-03-21 RX ADMIN — EPHEDRINE SULFATE 10 MG: 50 INJECTION INTRAVENOUS at 14:10

## 2025-03-21 RX ADMIN — DEXMEDETOMIDINE HYDROCHLORIDE 8 MCG: 100 INJECTION, SOLUTION INTRAVENOUS at 13:23

## 2025-03-21 RX ADMIN — KETAMINE HYDROCHLORIDE 10 MG: 10 INJECTION, SOLUTION INTRAMUSCULAR; INTRAVENOUS at 14:46

## 2025-03-21 RX ADMIN — DEXAMETHASONE SODIUM PHOSPHATE 8 MG: 4 INJECTION, SOLUTION INTRAMUSCULAR; INTRAVENOUS at 11:15

## 2025-03-21 RX ADMIN — EPHEDRINE SULFATE 10 MG: 50 INJECTION INTRAVENOUS at 15:09

## 2025-03-21 RX ADMIN — PHENYLEPHRINE HYDROCHLORIDE 100 MCG: 10 INJECTION INTRAVENOUS at 14:39

## 2025-03-21 RX ADMIN — FENTANYL CITRATE 25 MCG: 50 INJECTION, SOLUTION INTRAMUSCULAR; INTRAVENOUS at 13:26

## 2025-03-21 RX ADMIN — PHENYLEPHRINE HYDROCHLORIDE 80 MCG: 10 INJECTION INTRAVENOUS at 14:48

## 2025-03-21 RX ADMIN — ACETAMINOPHEN 975 MG: 325 TABLET, FILM COATED ORAL at 11:15

## 2025-03-21 RX ADMIN — MIDAZOLAM HYDROCHLORIDE 1 MG: 1 INJECTION, SOLUTION INTRAMUSCULAR; INTRAVENOUS at 12:04

## 2025-03-21 RX ADMIN — PREGABALIN 150 MG: 75 CAPSULE ORAL at 11:15

## 2025-03-21 RX ADMIN — GLYCOPYRROLATE 0.2 MG: 0.2 INJECTION INTRAMUSCULAR; INTRAVENOUS at 13:52

## 2025-03-21 RX ADMIN — SUGAMMADEX 161 MG: 100 INJECTION, SOLUTION INTRAVENOUS at 16:13

## 2025-03-21 RX ADMIN — SUCCINYLCHOLINE CHLORIDE 140 MG: 20 INJECTION, SOLUTION INTRAMUSCULAR; INTRAVENOUS at 13:28

## 2025-03-21 RX ADMIN — PHENYLEPHRINE HYDROCHLORIDE 50 MCG: 10 INJECTION INTRAVENOUS at 13:49

## 2025-03-21 RX ADMIN — PHENYLEPHRINE HYDROCHLORIDE 100 MCG: 10 INJECTION INTRAVENOUS at 14:16

## 2025-03-21 RX ADMIN — ROCURONIUM 5 MG: 50 INJECTION, SOLUTION INTRAVENOUS at 13:28

## 2025-03-21 RX ADMIN — LIDOCAINE HYDROCHLORIDE 2 ML: 20 INJECTION, SOLUTION EPIDURAL; INFILTRATION; INTRACAUDAL; PERINEURAL at 12:04

## 2025-03-21 RX ADMIN — SODIUM CHLORIDE, SODIUM LACTATE, POTASSIUM CHLORIDE, AND CALCIUM CHLORIDE 9 ML/HR: 600; 310; 30; 20 INJECTION, SOLUTION INTRAVENOUS at 11:16

## 2025-03-21 RX ADMIN — CEFAZOLIN 2000 MG: 2 INJECTION, POWDER, FOR SOLUTION INTRAVENOUS at 13:32

## 2025-03-21 RX ADMIN — FAMOTIDINE 20 MG: 10 INJECTION INTRAVENOUS at 11:16

## 2025-03-21 RX ADMIN — ONDANSETRON 4 MG: 2 INJECTION INTRAMUSCULAR; INTRAVENOUS at 11:16

## 2025-03-21 NOTE — H&P
History & Physical       Patient: Denny Hairston    YOB: 1970    Medical Record Number: [id number]    Surgeon:  Dr. Rohan Shaikh    Chief Complaints: Right shoulder pain, rotator cuff tear, SLAP tear with biceps tendinopathy      History of Present Illness: 55 y.o. male presents with right shoulder pain, rotator cuff tear, and SLAP tear. Onset of symptoms was greater than 6 months ago and has been progressively worsening despite more conservative treatment measures.  Symptoms are associated with ability to move, lift, push, pull, and perform activities of daily living with affected extremity. Symptoms are aggravated by overhead motion, lifting, and pushing as well as ROM necessary for activities of daily living.   Symptoms improve with rest, ice and elevation only minimally.      Allergies: No Known Allergies    Medications:   Home Medications:  No current facility-administered medications on file prior to encounter.     Current Outpatient Medications on File Prior to Encounter   Medication Sig    ferrous sulfate 325 (65 FE) MG tablet Take 1 tablet by mouth Daily With Breakfast.    FLUoxetine (PROzac) 20 MG capsule TAKE 1 CAPSULE BY MOUTH DAILY    methadone (DOLOPHINE) 10 MG tablet Take 12 tablets by mouth Daily,    naproxen (NAPROSYN) 500 MG tablet Take 1 tablet by mouth 2 (Two) Times a Day With Meals.    QUEtiapine (SEROquel) 50 MG tablet Take 1 tablet by mouth Every Night.    busPIRone (BUSPAR) 5 MG tablet Take 1 tablet by mouth 3 (Three) Times a Day. (Patient not taking: Reported on 3/4/2025)    naloxone (NARCAN) 4 MG/0.1ML nasal spray Call 911. Don't prime. [place] in 1 nostril for overdose. Repeat in 2-3 minutes in other nostril if no or minimal breathing/responsiveness.     Current Medications:  Scheduled Meds:ceFAZolin, 2,000 mg, Intravenous, Once  sodium chloride, 10 mL, Intravenous, Q12H      Continuous Infusions:lactated ringers, 9 mL/hr, Last Rate: 9 mL/hr (03/21/25 1116)      PRN Meds:.   lactated ringers    midazolam    sodium chloride    I have reviewed the patient's medical history in detail and updated the computerized patient record.  Review and summarization of old records include:    Past Medical History:   Diagnosis Date    Depression     Drug abuse     GERD (gastroesophageal reflux disease)     RENE (obstructive sleep apnea)     wears cpap        Past Surgical History:   Procedure Laterality Date    COLONOSCOPY W/ POLYPECTOMY N/A 2024    Procedure: COLONOSCOPY WITH POLYPECTOMY;  Surgeon: Rainer Doty MD;  Location: [facility] OR;  Service: Gastroenterology;  Laterality: N/A;  diverticulosis, ascending polyp x2, transverse polyp x2, descending polyp, sigmoid polyp    ENDOSCOPY N/A 2024    Procedure: ESOPHAGOGASTRODUODENOSCOPY WITH BIOPSY;  Surgeon: Rainer Doty MD;  Location: [facility] OR;  Service: Gastroenterology;  Laterality: N/A;  gastritis        Social History     Occupational History     Employer: UNKNOWN EMPLOYER    Occupation: [employer]     Comment: [employer]   Tobacco Use    Smoking status: Former     Current packs/day: 0.00     Average packs/day: 0.3 packs/day for 15.0 years (3.8 ttl pk-yrs)     Types: Cigarettes     Start date:      Quit date:      Years since quittin.2     Passive exposure: Current    Smokeless tobacco: Former   Vaping Use    Vaping status: Every Day    Substances: Nicotine, Flavoring    Devices: Pre-filled pod    Passive vaping exposure: Yes   Substance and Sexual Activity    Alcohol use: Not Currently    Drug use: Yes     Types: Marijuana     Comment: fentanyl, heroin - last use 2023    Sexual activity: Defer      Social History     Social History Narrative    Not on file        Family History   Problem Relation Age of Onset    Cancer Mother     Cancer Father     Cirrhosis Father     Liver disease Sister     Cancer Brother     Malig Hyperthermia Neg Hx        ROS: 14 point review of systems was performed and was  negative except for documented findings in HPI and today's encounter.     Allergies: No Known Allergies  Constitutional:  Denies fever, shaking or chills   Eyes:  Denies change in visual acuity   HENT:  Denies nasal congestion or sore throat   Respiratory:  Denies cough or shortness of breath   Cardiovascular:  Denies chest pain or severe LE edema   GI:  Denies abdominal pain, nausea, vomiting, bloody stools or diarrhea   Musculoskeletal:  Denies numbness tingling or loss of motor function except as outlined above in history of present illness.  : Denies painful urination or hematuria  Integument:  Denies rash, lesion or ulceration   Neurologic:  Denies headache or focal weakness  Endocrine:  Denies lymphadenopathy  Psych:  Denies confusion or change in mental status   Hem:  Denies active bleeding    Physical Exam: 55 y.o. male  Body mass index is 24.69 kg/m².  Vitals:    03/21/25 1117   BP: 116/80   Pulse: 71   Resp: 20   Temp:    SpO2: 95%     Vital signs reviewed.   General Appearance:    Alert, cooperative, in no acute distress                  Eyes: Conjunctivae clear  ENT: External ears and nose atraumatic  CV: No peripheral edema  Resp: Normal respiratory effort  Skin: No rashes or wounds; normal turgor  Psych: Mood and affect appropriate  Lymph: No nodes appreciated  Neuro: Gross sensation intact  Vascular:  Palpable peripheral pulse in noted extremity  Musculoskeletal Extremities: Right shoulder-active forward flexion 100 degrees, passive forward flexion 135 degrees, 3 out of 5 strength, active external rotation 20 degrees, passive 45 degrees, 4- out of 5 strength, 4+ out of 5 strength belly press test. Positive drop arm test, negative external rotation lag sign, positive empty can test positive Hinds Neer's positive Yergason and speeds, positive Sampson's. No tenderness over AC joint with negative crossarm test. Positive deltoid firing.     Debilities/Disabilities Identified: None      Diagnostic  Tests:  Pre-Admission Testing on 03/13/2025   Component Date Value Ref Range Status    Glucose 03/13/2025 89  65 - 99 mg/dL Final    BUN 03/13/2025 11  6 - 20 mg/dL Final    Creatinine 03/13/2025 0.84  0.76 - 1.27 mg/dL Final    Sodium 03/13/2025 140  136 - 145 mmol/L Final    Potassium 03/13/2025 3.9  3.5 - 5.2 mmol/L Final    Chloride 03/13/2025 104  98 - 107 mmol/L Final    CO2 03/13/2025 25.2  22.0 - 29.0 mmol/L Final    Calcium 03/13/2025 9.1  8.6 - 10.5 mg/dL Final    BUN/Creatinine Ratio 03/13/2025 13.1  7.0 - 25.0 Final    Anion Gap 03/13/2025 10.8  5.0 - 15.0 mmol/L Final    eGFR 03/13/2025 102.3  >60.0 mL/min/1.73 Final    Protime 03/13/2025 13.5  12.1 - 15.0 Seconds Final    INR 03/13/2025 0.99  0.90 - 1.10 Final    PTT 03/13/2025 27.5  24.3 - 38.1 seconds Final    WBC 03/13/2025 6.10  3.40 - 10.80 10*3/mm3 Final    RBC 03/13/2025 4.78  4.14 - 5.80 10*6/mm3 Final    Hemoglobin 03/13/2025 13.5  13.0 - 17.7 g/dL Final    Hematocrit 03/13/2025 41.3  37.5 - 51.0 % Final    MCV 03/13/2025 86.4  79.0 - 97.0 fL Final    MCH 03/13/2025 28.2  26.6 - 33.0 pg Final    MCHC 03/13/2025 32.7  31.5 - 35.7 g/dL Final    RDW 03/13/2025 14.3  12.3 - 15.4 % Final    RDW-SD 03/13/2025 45.1  37.0 - 54.0 fl Final    MPV 03/13/2025 9.0  6.0 - 12.0 fL Final    Platelets 03/13/2025 224  140 - 450 10*3/mm3 Final    Neutrophil % 03/13/2025 64.2  42.7 - 76.0 % Final    Lymphocyte % 03/13/2025 24.1  19.6 - 45.3 % Final    Monocyte % 03/13/2025 8.4  5.0 - 12.0 % Final    Eosinophil % 03/13/2025 2.3  0.3 - 6.2 % Final    Basophil % 03/13/2025 0.5  0.0 - 1.5 % Final    Immature Grans % 03/13/2025 0.5  0.0 - 0.5 % Final    Neutrophils, Absolute 03/13/2025 3.92  1.70 - 7.00 10*3/mm3 Final    Lymphocytes, Absolute 03/13/2025 1.47  0.70 - 3.10 10*3/mm3 Final    Monocytes, Absolute 03/13/2025 0.51  0.10 - 0.90 10*3/mm3 Final    Eosinophils, Absolute 03/13/2025 0.14  0.00 - 0.40 10*3/mm3 Final    Basophils, Absolute 03/13/2025 0.03  0.00 -  0.20 10*3/mm3 Final    Immature Grans, Absolute 03/13/2025 0.03  0.00 - 0.05 10*3/mm3 Final    nRBC 03/13/2025 0.0  0.0 - 0.2 /100 WBC Final    Hemoglobin A1C 03/13/2025 5.74 (H)  4.80 - 5.60 % Final     US Sonosite Portable  Result Date: 3/21/2025  Narrative: This procedure was auto-finalized with no dictation required.    US Sonosite Portable  Result Date: 3/20/2025  Narrative: This procedure was auto-finalized with no dictation required.    US Sonosite Portable  Result Date: 3/20/2025  Narrative: This procedure was auto-finalized with no dictation required.      Review again of outside x-rays as well as MRI right shoulder-reviewed imaging as well as radiology report indicates large full-thickness rotator cuff tear of the supraspinatus with associated SLAP tear noted and associated subacromial bursitis.  Mild degenerative change of the glenohumeral joint.  Sagittal imaging reveals approximately 25 to 30% atrophy of the supraspinatus.  Large complex subacromial fluid collection noted may be consistent with hemorrhage.     Assessment:    Traumatic tear of right rotator cuff    Subacromial bursitis of right shoulder joint    Biceps tendinitis of right upper extremity       Plan:   Patient wishes to proceed with right shoulder arthroscopy, rotator cuff debridement versus repair, possible biceps tenodesis, subacromial decompression, and all associated procedures.  I reviewed risks benefits and alternatives the procedure with risks including not limited to neurovascular damage, bleeding, infection, chronic pain, re-tear rotator cuff, failure of healing rotator cuff, loss of motion, weakness, stiffness, instability, biceps sag, DVT, pulmonary embolus, death, stroke, complex regional pain syndrome, myocardial infarction, need for additional procedures. He understood all these had all questions answered.  Patient consented to proceed with surgery.  No guarantees were given regarding results of surgery.      Denny Hairston  was given the opportunity to ask and have all questions answered today.  The patient voiced understanding of the risks, benefits, and alternative forms of treatment that were discussed and the patient consents to proceed with surgery.     Discharge Plan: to home in  Care of family/friend    Rohan Shaikh MD      Dragon transcription disclaimer     Much of this encounter note is an electronic transcription/translation of spoken language to printed text. The electronic translation of spoken language may permit erroneous, or at times, nonsensical words or phrases to be inadvertently transcribed. Although I have reviewed the note for such errors, some may still exist.

## 2025-03-21 NOTE — ANESTHESIA PROCEDURE NOTES
Peripheral Block    Pre-sedation assessment completed: 3/21/2025 12:03 PM    Patient reassessed immediately prior to procedure    Start time: 3/21/2025 12:04 PM  Stop time: 3/21/2025 12:09 PM  Reason for block: at surgeon's request, post-op pain management and secondary anesthetic  Performed by  CRNA/CAA: Aniyah Lee CRNA  Preanesthetic Checklist  Completed: patient identified, IV checked, site marked, risks and benefits discussed, surgical consent, monitors and equipment checked, pre-op evaluation and timeout performed  Prep:  Pt Position: sitting  Sterile barriers:cap, gloves, mask and washed/disinfected hands  Prep: ChloraPrep  Patient monitoring: blood pressure monitoring, continuous pulse oximetry and EKG  Procedure    Sedation: yes  Performed under: local infiltration  Guidance:ultrasound guided and landmark technique    ULTRASOUND INTERPRETATION.  Using ultrasound guidance a gauge needle was placed in close proximity to the brachial plexus nerve, at which point, under ultrasound guidance anesthetic was injected in the area of the nerve and spread of the anesthesia was seen on ultrasound in close proximity thereto.  There were no abnormalities seen on ultrasound; a digital image was taken; and the patient tolerated the procedure with no complications. Images:still images obtained, printed/placed on chart    Laterality:right  Block Type:interscalene  Injection Technique:single-shot  Needle Type:echogenic  Needle Gauge:21 G  Resistance on Injection: none    Medications Used: dexmedetomidine HCl (PRECEDEX) injection - Perineural   30 mcg - 3/21/2025 12:09:00 PM  bupivacaine PF (MARCAINE) injection 0.5% - Peripheral Nerve   15 mL - 3/21/2025 12:09:00 PM  lidocaine PF 2% (XYLOCAINE) injection - Infiltration   2 mL - 3/21/2025 12:04:00 PM      Post Assessment  Injection Assessment: negative aspiration for heme  Patient Tolerance:comfortable throughout block  Complications:no  Performed by: Aniyah Lee  Nette, CRNA

## 2025-03-21 NOTE — ANESTHESIA PREPROCEDURE EVALUATION
Anesthesia Evaluation     Patient summary reviewed and Nursing notes reviewed   no history of anesthetic complications:   NPO Solid Status: > 8 hours  NPO Liquid Status: > 2 hours           Airway   Mallampati: II  TM distance: >3 FB  Neck ROM: full  No difficulty expected  Dental      Pulmonary     breath sounds clear to auscultation  (+) a smoker Current, Smoked day of surgery, vape,sleep apnea on CPAP  Cardiovascular - negative cardio ROS  Exercise tolerance: good (4-7 METS)    ECG reviewed  Rhythm: regular  Rate: normal      ROS comment: HEART RATE=55  bpm  RR Fqzcogwg=1620  ms  IL Uctwhnxe=135  ms  P Horizontal Axis=28  deg  P Front Axis=31  deg  QRSD Interval=89  ms  QT Tiprqweh=251  ms  XCzY=726  ms  QRS Axis=-45  deg  T Wave Axis=14  deg  - ABNORMAL ECG -  Sinus rhythm  Left anterior fascicular block  No change from prior tracing  Electronically Signed By: Soledad Briones ([facility]) 2024-07-12 09:13:28  Date and Time of Study:2024-07-12 08:49:55    Interpretation Summary       ·  Left ventricular systolic function is normal. Calculated left ventricular EF = 59.1%  ·  Left ventricular diastolic function was normal.  ·  No significant valvular abnormalities.        Neuro/Psych  (+) psychiatric history Depression  GI/Hepatic/Renal/Endo    (+) GERD well controlled    Musculoskeletal         ROS comment: Subacromial bursitis of right shoulder joint  Abdominal    Substance History   (+) drug use (Heroin use disorder, severe, in early remission Methamphetamine abuse Cocaine use disorder Methamphetamine abuse)     OB/GYN          Other        ROS/Med Hx Other: Relaps 7/2024  Methadone clinic [phone] [place]   Took methadone this am 120mg                Anesthesia Plan    ASA 3     general with block     intravenous induction     Anesthetic plan, risks, benefits, and alternatives have been provided, discussed and informed consent has been obtained with: patient.    Use of blood products discussed with patient   Consented to blood products.      CODE STATUS:

## 2025-03-21 NOTE — ANESTHESIA POSTPROCEDURE EVALUATION
Patient: Denny Hairston    Procedure Summary       Date: 03/21/25 Room / Location: [facility] OR 3 / [facility] OR; [facility] ULTRASOUND    Anesthesia Start: 1319 Anesthesia Stop: 1630    Procedures:       US SONOSITE PORTABLE      shoulder arthroscopy, rotator cuff debridement versus repair, possible biceps tenodesis, subacromial decompression, and all associated procedures (Right: Shoulder) Diagnosis:       Traumatic tear of right rotator cuff, unspecified tear extent, initial encounter      Subacromial bursitis of right shoulder joint      Biceps tendinitis of right upper extremity      (block)      (Traumatic tear of right rotator cuff, unspecified tear extent, initial encounter [S46.011A])      (Subacromial bursitis of right shoulder joint [M75.51])      (Biceps tendinitis of right upper extremity [M75.21])    Scheduled Providers: Rohan Shaikh MD Provider: Roseanna James CRNA    Anesthesia Type: general with block ASA Status: 3            Anesthesia Type: general with block    Vitals  Vitals Value Taken Time   BP 94/61 03/21/25 16:52   Temp 97.6 °F (36.4 °C) 03/21/25 16:26   Pulse 71 03/21/25 16:52   Resp 16 03/21/25 16:50   SpO2 93 % 03/21/25 16:52   Vitals shown include unfiled device data.        Post Anesthesia Care and Evaluation    Patient location during evaluation: PHASE II  Patient participation: complete - patient participated  Level of consciousness: awake and alert  Pain score: 0  Pain management: adequate    Airway patency: patent  Anesthetic complications: No anesthetic complications  PONV Status: none  Cardiovascular status: acceptable  Respiratory status: acceptable  Hydration status: acceptable

## 2025-03-21 NOTE — ANESTHESIA PROCEDURE NOTES
Airway  Reason: elective    Date/Time: 3/21/2025 1:30 PM  Airway not difficult    General Information and Staff    Patient location during procedure: OR  Anesthesiologist: Viry Figueroa MD    Indications and Patient Condition  Indications for airway management: airway protection    Preoxygenated: yes  MILS maintained throughout    Mask difficulty assessment: 1 - vent by mask    Final Airway Details    Final airway type: endotracheal airway      Successful airway: ETT  Cuffed: yes   Successful intubation technique: direct laryngoscopy  Adjuncts used in placement: intubating stylet and anterior pressure/BURP  Endotracheal tube insertion site: oral  Blade: [place]  Blade size: 3  ETT size (mm): 7.5  Cormack-Lehane Classification: grade I - full view of glottis  Placement verified by: chest auscultation   Cuff volume (mL): 7  Measured from: lips  ETT/EBT  to lips (cm): 22  Number of attempts at approach: 1  Assessment: lips, teeth, and gum same as pre-op and atraumatic intubation

## 2025-03-28 ENCOUNTER — OFFICE VISIT (OUTPATIENT)
Dept: ORTHOPEDIC SURGERY | Facility: CLINIC | Age: 55
End: 2025-03-28
Payer: COMMERCIAL

## 2025-03-28 VITALS — HEIGHT: 71 IN | WEIGHT: 177 LBS | BODY MASS INDEX: 24.78 KG/M2

## 2025-03-28 DIAGNOSIS — Z98.890 STATUS POST ARTHROSCOPY OF SHOULDER: Primary | ICD-10-CM

## 2025-03-28 PROCEDURE — 99024 POSTOP FOLLOW-UP VISIT: CPT | Performed by: NURSE PRACTITIONER

## 2025-03-28 RX ORDER — DOXYCYCLINE 100 MG/1
100 CAPSULE ORAL 2 TIMES DAILY
Qty: 8 CAPSULE | Refills: 0 | Status: SHIPPED | OUTPATIENT
Start: 2025-03-28

## 2025-03-28 NOTE — PROGRESS NOTES
CC: F/u s/p rotator cuff repair, biceps tenodesis, extensive debridement labrum, humeral head, subacromial bursa, DOS 3/21/2025     Interval History: Patient returns to clinic stating pain is doing fairly well, has been using sling as instructed, denies any numbness or tingling over right arm. No fevers, chills, or sweats, and no drainage from incisions noted.    Exam:   Right shoulder- incisions clean, dry, sutures in place   Positive sensation all distributions right hand and proximal lateral aspect arm   Cap refill < 3 seconds, radial pulse 2+   Positive deltoid firing   Flex/extend fingers/thumb/wrist with 4+/5 strength, positive thumbs up, okay sign, cross finger adduction and abduction against resistance     Impression: s/p right shoulder rotator cuff repair, biceps tenodesis, extensive debridement labrum, humeral head, subacromial bursa     Plan:  1. D/c sutures today and replace with steri-strips- may shower, no submerging wounds x 4 weeks  2. F/u in 3 wks  3. Will start PT at 4 wk reina utilizing massive rotator cuff repair protocol, sling x 6 weeks. Continue use of sling. Work on finger and wrist ROM. May do gentle elbow ROM 2x/day while out of sling for showering or changing clothes.   4. All questions answered.      New Medications Ordered This Visit   Medications    doxycycline (VIBRAMYCIN) 100 MG capsule     Sig: Take 1 capsule by mouth 2 (Two) Times a Day.     Dispense:  8 capsule     Refill:  0       Orders Placed This Encounter   Procedures    Ambulatory Referral to Physical Therapy for Evaluation & Treatment     Referral Priority:   Routine     Referral Type:   Physical Therapy     Referral Reason:   Specialty Services Required     Requested Specialty:   Physical Therapy     Number of Visits Requested:   1

## 2025-04-16 ENCOUNTER — OFFICE VISIT (OUTPATIENT)
Dept: ORTHOPEDIC SURGERY | Facility: CLINIC | Age: 55
End: 2025-04-16
Payer: COMMERCIAL

## 2025-04-16 VITALS — HEIGHT: 71 IN | WEIGHT: 177 LBS | BODY MASS INDEX: 24.78 KG/M2

## 2025-04-16 DIAGNOSIS — Z98.890 STATUS POST ARTHROSCOPY OF SHOULDER: Primary | ICD-10-CM

## 2025-04-16 NOTE — PROGRESS NOTES
CC: F/u s/p right shoulder rotator cuff repair, extensive debridement and biceps tenodesis  DOS 3/21/2025    Interval History: Patient returns to clinic stating pain is doing fairly well, has been using sling as instructed, denies any numbness or tingling over right arm. No fevers, chills, or sweats, and no drainage from incisions noted. He has not started into physical therapy.    Exam:   Right shoulder- incisions healing well   Tolerates FF- 95,   ER- 20   Positive sensation all distributions right hand and proximal lateral aspect arm, positive deltoid firing   Cap refill < 3 seconds, radial pulse 2+   Positive deltoid firing   Flex/extend fingers/thumb/wrist with 4+/5 strength, positive thumbs up, okay sign, cross finger adduction and abduction against resistance     Rehab: Massive rotator cuff repair protocol, sling x 6 weeks    Impression: s/p right shoulder rotator cuff repair and biceps tenodesis     Plan:  1. Begin PT for work on ROM and progressing into strengthening per protocol  2. Continue sling x 2 weeks then discontinue as tolerated  3. Instructed on passive ROM exercises to be done multiple times daily at home  4. F/u in 6 weeks to evaluate motion and progress with PT  5. All questions answered      No orders of the defined types were placed in this encounter.      No orders of the defined types were placed in this encounter.

## 2025-04-24 ENCOUNTER — HOSPITAL ENCOUNTER (OUTPATIENT)
Dept: PHYSICAL THERAPY | Facility: HOSPITAL | Age: 55
Setting detail: THERAPIES SERIES
Discharge: HOME | End: 2025-04-24
Payer: COMMERCIAL

## 2025-04-24 DIAGNOSIS — Z98.890 STATUS POST ARTHROSCOPY OF SHOULDER: Primary | ICD-10-CM

## 2025-04-24 PROCEDURE — 97161 PT EVAL LOW COMPLEX 20 MIN: CPT

## 2025-04-24 NOTE — THERAPY EVALUATION
Outpatient Physical Therapy Ortho Initial Evaluation   [facility]     Patient Name: Denny Hairston  : 1970  MRN: [id number]  Today's Date: 2025      Visit Date: 2025    Patient Active Problem List   Diagnosis    Recurrent major depressive disorder, in full remission    Heroin use disorder, severe, in early remission    Positive colorectal cancer screening using Cologuard test    Obstructive sleep apnea    Adjustment insomnia    Gastroesophageal reflux disease    Methamphetamine abuse    Tobacco dependence syndrome    Cocaine use disorder    Primary insomnia    Traumatic tear of right rotator cuff    Subacromial bursitis of right shoulder joint    Biceps tendinitis of right upper extremity    s/p rotator cuff repair, biceps tenodesis, extensive debridement labrum, humeral head, subacromial bursa, DOS 3/21/2025        Past Medical History:   Diagnosis Date    Depression     Drug abuse     GERD (gastroesophageal reflux disease)     RENE (obstructive sleep apnea)     wears cpap        Past Surgical History:   Procedure Laterality Date    COLONOSCOPY W/ POLYPECTOMY N/A 2024    Procedure: COLONOSCOPY WITH POLYPECTOMY;  Surgeon: Rainer Doty MD;  Location: [facility];  Service: Gastroenterology;  Laterality: N/A;  diverticulosis, ascending polyp x2, transverse polyp x2, descending polyp, sigmoid polyp    ENDOSCOPY N/A 2024    Procedure: ESOPHAGOGASTRODUODENOSCOPY WITH BIOPSY;  Surgeon: Rainer Doty MD;  Location: [facility];  Service: Gastroenterology;  Laterality: N/A;  gastritis    SHOULDER ARTHROSCOPY W/ ROTATOR CUFF REPAIR Right 3/21/2025    Procedure: shoulder arthroscopy, rotator cuff debridement versus repair, possible biceps tenodesis, subacromial decompression, and all associated procedures;  Surgeon: Rohan Shaikh MD;  Location: [facility];  Service: Orthopedics;  Laterality: Right;       Visit Dx:     ICD-10-CM ICD-9-CM   1. s/p rotator cuff repair, biceps tenodesis,  extensive debridement labrum, humeral head, subacromial bursa, DOS 3/21/2025  Z98.890 V45.89          Patient History       Row Name 04/24/25 1100             History    Chief Complaint Difficulty with daily activities;Joint stiffness;Joint swelling;Muscle tenderness;Muscle weakness;Pain  -AS      Type of Pain Shoulder pain  Right Shoulder  -AS      Date Current Problem(s) Began 03/21/25  -AS      Brief Description of Current Complaint Denny Hairston is an 54 y.o. male who presents to outpatient PT s/p right shoulder arthroscopic rotator cuff repair, biceps tenodesis, and extensive debridement-labrum, humeral head, subacromial bursa. Surgery was performed on 3-21-25 by Dr. Shaikh. Patient was D/C home the same day with a referral for outpatient PT to start at week 4 post op. Patient is to wear sling for 6 weeks post op and he has been compliant with this. Patient has been placed on massive rotator cuff repair protocol. Patient reports pain has been well controlled with pain medication and the use of ice.  -AS      Previous treatment for THIS PROBLEM Surgery;Medication  -AS      Surgery Date: 03/21/25  -AS      Patient/Caregiver Goals Relieve pain;Return to prior level of function;Improve strength;Know what to do to help the symptoms  -AS      Patient's Rating of General Health Good  -AS      Hand Dominance right-handed  -AS      Patient seeing anyone else for problem(s)? Dr. Shaikh  -AS      How has patient tried to help current problem? rest, medication, ice, sling  -AS      What clinical tests have you had for this problem? X-ray;MRI  -AS      Results of Clinical Tests Right RC tear  -AS      Surgery/Hospitalization RC repair 3-21-25  -AS         Pain     Pain Location Shoulder  -AS      Pain Frequency Intermittent  -AS      Pain Description Aching  -AS      What Performance Factors Make the Current Problem(s) WORSE? Activity  -AS      What Performance Factors Make the Current Problem(s) BETTER? Rest  -AS          Daily Activities    Primary Language English  -AS      Are you able to read Yes  -AS      Are you able to write Yes  -AS      How does patient learn best? Listening;Reading;Demonstration  -AS      Teaching needs identified Home Exercise Program  -AS      Patient is concerned about/has problems with Difficulty with self care (i.e. bathing, dressing, toileting:;Flexibility;Performing home management (household chores, shopping, care of dependents);Performing job responsibilities/community activities (work, school,;Performing sports, recreation, and play activities;Reaching over head;Repetitive movements of the hand, arm, shoulder  -AS      Does patient have problems with the following? None  -AS      Barriers to learning None  -AS      Pt Participated in POC and Goals Yes  -AS         Safety    Are you being hurt, hit, or frightened by anyone at home or in your life? No  -AS      Are you being neglected by a caregiver No  -AS                User Key  (r) = Recorded By, (t) = Taken By, (c) = Cosigned By      Initials Name Provider Type    AS Devon Morales, PT Physical Therapist                     PT Ortho       Row Name 04/24/25 1100       Precautions and Contraindications    Precautions/Limitations sternal precautions  Massive RC repair Protocol. D.O.S 3-21-25 - protocol in pt chart  -AS       Posture/Observations    Posture- WNL Posture is WNL  -AS    Observations Incision healing  -AS       Right Upper Ext    Rt Shoulder Abduction PROM 140  -AS    Rt Shoulder Flexion PROM 143  -AS    Rt Shoulder External Rotation PROM 43  -AS    Rt Shoulder Internal Rotation PROM 60  -AS       MMT Right Upper Ext    Rt Shoulder Flexion MMT, Gross Movement --  Not performed during IE due to recent surgery  -AS    Rt Shoulder ABduction MMT, Gross Movement --  Not performed during IE due to recent surgery  -AS    Rt Shoulder Internal Rotation MMT, Gross Movement --  Not performed during IE due to recent surgery  -AS    Rt  Shoulder External Rotation MMT, Gross Movement --  Not performed during IE due to recent surgery  -AS       Sensation    Sensation WNL? WNL  -AS    Light Touch No apparent deficits  -AS              User Key  (r) = Recorded By, (t) = Taken By, (c) = Cosigned By      Initials Name Provider Type    AS Devon Morales, PT Physical Therapist                                Therapy Education  Given: HEP, Symptoms/condition management, Pain management  Program: New  How Provided: Verbal, Demonstration, Written  Provided to: Patient  Level of Understanding: Teach back education performed, Verbalized, Demonstrated      PT OP Goals       Row Name 04/24/25 1100          PT Short Term Goals    STG 1 Patient to demonstrate compliance with initial HEP for flexibility, ROM and strengthening.  -AS     STG 2 Patient to report right shoulder pain on VAS of 4-5/10 at worst with activity.  -AS     STG 3 Patient to demonstrate improved right shoulder strength to 4/5 in all planes.  -AS     STG 4 Patient to demonstrate improved right shoulder PROM to within 10 degrees of contralateral shoulder.  -AS        Long Term Goals    LTG 1 Patient to demonstrate compliance with advanced HEP for flexibility, ROM and strengthening.  -AS     LTG 2 Patient to report right shoulder pain on VAS of 1-2/10 at worst with activity.  -AS     LTG 3 Patient to demonstrate improved right shoulder strength to 4+/5 in all planes.  -AS     LTG 4 Patient to demonstrate improved right shoulder AROM to WFL in all planes.  -AS     LTG 5 Patient to report improved function and decreased pain Quick DASH by >10-15 points.  -AS               User Key  (r) = Recorded By, (t) = Taken By, (c) = Cosigned By      Initials Name Provider Type    AS Devon Morales, PT Physical Therapist                     PT Assessment/Plan       Row Name 04/24/25 1100          PT Assessment    Functional Limitations Limitation in home management;Limitations in community  activities;Performance in leisure activities;Performance in sport activities;Performance in work activities  -AS     Impairments Impaired flexibility;Muscle strength;Pain;Range of motion  -AS     Assessment Comments Denny Hairston is an 54 y.o. male who presents to outpatient PT s/p right shoulder arthroscopic rotator cuff repair, biceps tenodesis, and extensive debridement-labrum, humeral head, subacromial bursa. Surgery was performed on 3-21-25 by Dr. Shaikh. Patient was D/C home the same day with a referral for outpatient PT to start at week 4 post op. Patient is to wear sling for 6 weeks post op and he has been compliant with this. Patient has been placed on massive rotator cuff repair protocol. Patient reports pain has been well controlled with pain medication and the use of ice. Patient has limited right shoulder ROM, limited right shoulder and RUE strength, and increased pain and discomfort with activity. Patient has limited function at this time secondary to the above.  -AS     Please refer to paper survey for additional self-reported information Yes  -AS     Rehab Potential Good  -AS     Patient/caregiver participated in establishment of treatment plan and goals Yes  -AS     Patient would benefit from skilled therapy intervention Yes  -AS        PT Plan    PT Frequency 1x/week;2x/week  -AS     Predicted Duration of Therapy Intervention (PT) 8-10 weeks  -AS     Planned CPT's? PT RE-EVAL: 09212;PT THER PROC EA 15 MIN: 49310;PT THER ACT EA 15 MIN: 12432;PT MANUAL THERAPY EA 15 MIN: 63671;PT NEUROMUSC RE-EDUCATION EA 15 MIN: 65782  -AS               User Key  (r) = Recorded By, (t) = Taken By, (c) = Cosigned By      Initials Name Provider Type    AS Devon Morales, PT Physical Therapist                     Modalities       Row Name 04/24/25 1100             Subjective Pain    Able to rate subjective pain? yes  -AS         Moist Heat    MH Applied Yes  -AS      Location Right Shoulder - Supine with roll  under knees  -AS      PT Moist Heat Minutes 10  -AS      MH Prior to Rx Yes  -AS                User Key  (r) = Recorded By, (t) = Taken By, (c) = Cosigned By      Initials Name Provider Type    AS Devno Morales, PT Physical Therapist                   OP Exercises       Row Name 04/24/25 1100             Subjective Pain    Able to rate subjective pain? yes  -AS      Pre-Treatment Pain Level 3  -AS      Post-Treatment Pain Level 3  -AS         Exercise 1    Exercise Name 1 3-Way Cane  -AS      Reps 1 15  -AS      Time 1 5 sec hold each  -AS         Exercise 2    Exercise Name 2 Wall Slides  -AS         Exercise 3    Exercise Name 3 Sleeper Stretch  -AS         Exercise 4    Exercise Name 4 IR Towel Stretch  -AS         Exercise 5    Exercise Name 5 Pulley's - Flex & ABD  -AS      Time 5 3 min each  -AS                User Key  (r) = Recorded By, (t) = Taken By, (c) = Cosigned By      Initials Name Provider Type    AS Devon Morales, PT Physical Therapist                  Manual Rx (Last 36 Hours)       Manual Treatments       Row Name 04/24/25 1100             Manual Rx 1    Manual Rx 1 Location Right Shoulder  -AS      Manual Rx 1 Type PROM - Flex, ABD, IR, ER  -AS      Manual Rx 1 Duration 15 min  -AS                User Key  (r) = Recorded By, (t) = Taken By, (c) = Cosigned By      Initials Name Provider Type    AS Devon Morales, PT Physical Therapist                                Outcome Measure Options: Quick DASH  Quick DASH  Open a tight or new jar.: Moderate Difficulty  Do heavy household chores (e.g., wash walls, wash floors): Moderate Difficulty  Carry a shopping bag or briefcase: Moderate Difficulty  Wash your back: Moderate Difficulty  Use a knife to cut food: Moderate Difficulty  Recreational activities in which you take some force or impact through your arm, should or hand (e.g. golf, hammering, tennis, etc.): Unable  During the past week, to what extent has your arm, shoulder,  or hand problem interfered with your normal social activites with family, friends, neighbors or groups?: Slightly  During the past week, were you limited in your work or other regular daily activities as a result of your arm, shoulder or hand problem?: Very limited  Arm, Shoulder, or hand pain: Moderate  Tingling (pins and needles) in your arm, shoulder, or hand: Mild  During the past week, how much difficulty have you had sleeping because of the pain in your arm, shoulder or hand?: Moderate Difficiculty  Number of Questions Answered: 11  Quick DASH Score: 52.27         Time Calculation:     Start Time: 1057  Stop Time: 1200  Time Calculation (min): 63 min  Untimed Charges  PT Moist Heat Minutes: 10  Total Minutes  Untimed Charges Total Minutes: 10   Total Minutes: 10     Therapy Charges for Today       Code Description Service Date Service Provider Modifiers Qty    39603698855 HC PT EVAL LOW COMPLEXITY 4 4/24/2025 Devon Morales, PT GP 1            PT G-Codes  Outcome Measure Options: Quick DASH  Quick DASH Score: 52.27         Devon Morales, PT  4/24/2025

## 2025-05-06 ENCOUNTER — HOSPITAL ENCOUNTER (OUTPATIENT)
Dept: PHYSICAL THERAPY | Facility: HOSPITAL | Age: 55
Setting detail: THERAPIES SERIES
Discharge: HOME | End: 2025-05-06
Payer: COMMERCIAL

## 2025-05-06 DIAGNOSIS — Z98.890 STATUS POST ARTHROSCOPY OF SHOULDER: Primary | ICD-10-CM

## 2025-05-06 PROCEDURE — 97140 MANUAL THERAPY 1/> REGIONS: CPT

## 2025-05-06 PROCEDURE — 97110 THERAPEUTIC EXERCISES: CPT

## 2025-05-06 NOTE — THERAPY TREATMENT NOTE
Outpatient Physical Therapy Ortho Treatment Note   [facility]     Patient Name: Denny Hairston  : 1970  MRN: [id number]  Today's Date: 2025      Visit Date: 2025    Visit Dx:    ICD-10-CM ICD-9-CM   1. s/p rotator cuff repair, biceps tenodesis, extensive debridement labrum, humeral head, subacromial bursa, DOS 3/21/2025  Z98.890 V45.89       Patient Active Problem List   Diagnosis    Recurrent major depressive disorder, in full remission    Heroin use disorder, severe, in early remission    Positive colorectal cancer screening using Cologuard test    Obstructive sleep apnea    Adjustment insomnia    Gastroesophageal reflux disease    Methamphetamine abuse    Tobacco dependence syndrome    Cocaine use disorder    Primary insomnia    Traumatic tear of right rotator cuff    Subacromial bursitis of right shoulder joint    Biceps tendinitis of right upper extremity    s/p rotator cuff repair, biceps tenodesis, extensive debridement labrum, humeral head, subacromial bursa, DOS 3/21/2025        Past Medical History:   Diagnosis Date    Depression     Drug abuse     GERD (gastroesophageal reflux disease)     RENE (obstructive sleep apnea)     wears cpap        Past Surgical History:   Procedure Laterality Date    COLONOSCOPY W/ POLYPECTOMY N/A 2024    Procedure: COLONOSCOPY WITH POLYPECTOMY;  Surgeon: Rainer Doty MD;  Location: [facility] OR;  Service: Gastroenterology;  Laterality: N/A;  diverticulosis, ascending polyp x2, transverse polyp x2, descending polyp, sigmoid polyp    ENDOSCOPY N/A 2024    Procedure: ESOPHAGOGASTRODUODENOSCOPY WITH BIOPSY;  Surgeon: Rainer Doty MD;  Location: [facility] OR;  Service: Gastroenterology;  Laterality: N/A;  gastritis    SHOULDER ARTHROSCOPY W/ ROTATOR CUFF REPAIR Right 3/21/2025    Procedure: shoulder arthroscopy, rotator cuff debridement versus repair, possible biceps tenodesis, subacromial decompression, and all associated procedures;  Surgeon:  Rohan Shaikh MD;  Location: [facility];  Service: Orthopedics;  Laterality: Right;                        PT Assessment/Plan       Row Name 05/06/25 0600          PT Assessment    Assessment Comments Patient is now 6 weeks post op, AROM of right shoulder was initiated today and patient tolerated this well. Patient continues to have good right shoulder ROM in all planes. Plan to continue to progress patient as tolerated and per his post op protocol.  -AS        PT Plan    PT Plan Comments Continue with current treatment plan.  -AS               User Key  (r) = Recorded By, (t) = Taken By, (c) = Cosigned By      Initials Name Provider Type    AS Devon Morales, PT Physical Therapist                     Modalities       Row Name 05/06/25 0600             Moist Heat    MH Applied Yes  -AS      Location Right Shoulder - Supine with roll under knees  -AS      PT Moist Heat Minutes 10  -AS      MH Prior to Rx Yes  -AS         Functional Testing    Outcome Measure Options Quick DASH  -AS                User Key  (r) = Recorded By, (t) = Taken By, (c) = Cosigned By      Initials Name Provider Type    AS Devon Morales, PT Physical Therapist                   OP Exercises       Row Name 05/06/25 0647 05/06/25 0600          Subjective    Subjective Comments -- Patient states his shoulder continues to improve. He states he is being compliant with his HEP.  -AS        Total Minutes    58198 - PT Therapeutic Exercise Minutes 20  -AS --     34795 - PT Manual Therapy Minutes 15  -AS --        Exercise 1    Exercise Name 1 -- 3-Way Cane  -AS     Reps 1 -- 15  -AS     Time 1 -- 5 sec hold each  -AS        Exercise 2    Exercise Name 2 -- Supine AROM for Flexion  -AS     Reps 2 -- 15  -AS        Exercise 3    Exercise Name 3 -- Sleeper Stretch  -AS        Exercise 4    Exercise Name 4 -- IR Towel Stretch  -AS        Exercise 5    Exercise Name 5 -- Pulley's - Flex & ABD  -AS     Time 5 -- 3 min each  -AS         Exercise 6    Exercise Name 6 -- Serratus Punches  -AS     Reps 6 -- 25  -AS        Exercise 7    Exercise Name 7 -- S/L ER  -AS     Reps 7 -- 25  -AS        Exercise 8    Exercise Name 8 -- Prone Y/T  -AS        Exercise 9    Exercise Name 9 -- Empty/Full Can  -AS     Reps 9 -- --  -AS        Exercise 10    Exercise Name 10 -- Rows  -AS        Exercise 11    Exercise Name 11 -- Extensions  -AS        Exercise 12    Exercise Name 12 -- IR  -AS        Exercise 13    Exercise Name 13 -- ER  -AS               User Key  (r) = Recorded By, (t) = Taken By, (c) = Cosigned By      Initials Name Provider Type    AS Devon Morales, PT Physical Therapist                             Manual Rx (Last 36 Hours)       Manual Treatments       Row Name 05/06/25 0647 05/06/25 0500          Total Minutes    73723 - PT Manual Therapy Minutes 15  -AS --        Manual Rx 1    Manual Rx 1 Location -- Right Shoulder  -AS     Manual Rx 1 Type -- PROM - Flex, ABD, IR, ER  -AS     Manual Rx 1 Duration -- 15 min  -AS               User Key  (r) = Recorded By, (t) = Taken By, (c) = Cosigned By      Initials Name Provider Type    AS Devon Morales, PT Physical Therapist                             Outcome Measure Options: Quick DASH         Time Calculation:   Start Time: 0600  Stop Time: 0647  Time Calculation (min): 47 min  Timed Charges  35069 - PT Therapeutic Exercise Minutes: 20  82205 - PT Manual Therapy Minutes: 15  Untimed Charges  PT Moist Heat Minutes: 10  Total Minutes  Timed Charges Total Minutes: 35  Untimed Charges Total Minutes: 10   Total Minutes: 45  Therapy Charges for Today       Code Description Service Date Service Provider Modifiers Qty    05996005420 HC PT THER PROC EA 15 MIN 5/6/2025 Devon Morales, PT GP 1    60232048448 HC PT MANUAL THERAPY EA 15 MIN 5/6/2025 Devon Morales, PT GP 1            PT G-Codes  Outcome Measure Options: Stacy Morales, RISHI  5/6/2025

## 2025-05-20 ENCOUNTER — HOSPITAL ENCOUNTER (OUTPATIENT)
Dept: PHYSICAL THERAPY | Facility: HOSPITAL | Age: 55
Setting detail: THERAPIES SERIES
Discharge: HOME | End: 2025-05-20
Payer: COMMERCIAL

## 2025-05-20 DIAGNOSIS — Z98.890 STATUS POST ARTHROSCOPY OF SHOULDER: Primary | ICD-10-CM

## 2025-05-20 PROCEDURE — 97110 THERAPEUTIC EXERCISES: CPT

## 2025-05-20 NOTE — THERAPY TREATMENT NOTE
Outpatient Physical Therapy Ortho Treatment Note   [facility]     Patient Name: Denny Hairston  : 1970  MRN: [id number]  Today's Date: 2025      Visit Date: 2025    Visit Dx:    ICD-10-CM ICD-9-CM   1. s/p rotator cuff repair, biceps tenodesis, extensive debridement labrum, humeral head, subacromial bursa, DOS 3/21/2025  Z98.890 V45.89       Patient Active Problem List   Diagnosis    Recurrent major depressive disorder, in full remission    Heroin use disorder, severe, in early remission    Positive colorectal cancer screening using Cologuard test    Obstructive sleep apnea    Adjustment insomnia    Gastroesophageal reflux disease    Methamphetamine abuse    Tobacco dependence syndrome    Cocaine use disorder    Primary insomnia    Traumatic tear of right rotator cuff    Subacromial bursitis of right shoulder joint    Biceps tendinitis of right upper extremity    s/p rotator cuff repair, biceps tenodesis, extensive debridement labrum, humeral head, subacromial bursa, DOS 3/21/2025        Past Medical History:   Diagnosis Date    Depression     Drug abuse     GERD (gastroesophageal reflux disease)     RENE (obstructive sleep apnea)     wears cpap        Past Surgical History:   Procedure Laterality Date    COLONOSCOPY W/ POLYPECTOMY N/A 2024    Procedure: COLONOSCOPY WITH POLYPECTOMY;  Surgeon: Rainer Doty MD;  Location: [facility] OR;  Service: Gastroenterology;  Laterality: N/A;  diverticulosis, ascending polyp x2, transverse polyp x2, descending polyp, sigmoid polyp    ENDOSCOPY N/A 2024    Procedure: ESOPHAGOGASTRODUODENOSCOPY WITH BIOPSY;  Surgeon: Rainer Doty MD;  Location: [facility] OR;  Service: Gastroenterology;  Laterality: N/A;  gastritis    SHOULDER ARTHROSCOPY W/ ROTATOR CUFF REPAIR Right 3/21/2025    Procedure: shoulder arthroscopy, rotator cuff debridement versus repair, possible biceps tenodesis, subacromial decompression, and all associated procedures;  Surgeon:  "Rohan Shaikh MD;  Location: [facility];  Service: Orthopedics;  Laterality: Right;                        PT Assessment/Plan       Row Name 05/20/25 0500          PT Assessment    Assessment Comments Continued with AAROM, AROM, and PROM exercises today and patient continues to tolerate well. Patient's right shoulder ROM continues to do well in all planes. Plan to continue to progress patient per his post op protocol.  -AS        PT Plan    PT Plan Comments Continue with current treatment plan.  -AS               User Key  (r) = Recorded By, (t) = Taken By, (c) = Cosigned By      Initials Name Provider Type    AS Devon Morales, PT Physical Therapist                     Modalities       Row Name 05/20/25 0500             Moist Heat    MH Applied Yes  -AS      Location Right Shoulder - Sitting  -AS      PT Moist Heat Minutes 10  -AS      MH Prior to Rx Yes  -AS         Functional Testing    Outcome Measure Options Quick DASH  -AS                User Key  (r) = Recorded By, (t) = Taken By, (c) = Cosigned By      Initials Name Provider Type    AS Devon Morales, PT Physical Therapist                   OP Exercises       Row Name 05/20/25 0635 05/20/25 0500          Subjective    Subjective Comments -- Patient states his shoulder is a little sore. He states he is not doing his exercises \"as much as I should be.\"  -AS        Total Minutes    47272 - PT Therapeutic Exercise Minutes 25  -AS --     94231 - PT Manual Therapy Minutes 10  -AS --        Exercise 1    Exercise Name 1 -- 3-Way Cane  -AS     Reps 1 -- 15  -AS     Time 1 -- 5 sec hold each  -AS        Exercise 2    Exercise Name 2 -- Supine AROM for Flexion  -AS     Reps 2 -- 15  -AS        Exercise 3    Exercise Name 3 -- Sleeper Stretch  -AS        Exercise 4    Exercise Name 4 -- IR Towel Stretch  -AS        Exercise 5    Exercise Name 5 -- Pulley's - Flex & ABD  -AS     Time 5 -- 3 min each  -AS        Exercise 6    Exercise Name 6 -- " Tanviatus Punches  -AS     Reps 6 -- 25  -AS        Exercise 7    Exercise Name 7 -- S/L ER  -AS     Reps 7 -- 25  -AS        Exercise 8    Exercise Name 8 -- Prone Y/T  -AS        Exercise 9    Exercise Name 9 -- Empty/Full Can  -AS     Reps 9 -- 25 - Full Can Only  -AS        Exercise 10    Exercise Name 10 -- Rows  -AS        Exercise 11    Exercise Name 11 -- Extensions  -AS        Exercise 12    Exercise Name 12 -- IR  -AS        Exercise 13    Exercise Name 13 -- ER  -AS               User Key  (r) = Recorded By, (t) = Taken By, (c) = Cosigned By      Initials Name Provider Type    AS Devon Morales, PT Physical Therapist                             Manual Rx (Last 36 Hours)       Manual Treatments       Row Name 05/20/25 0635             Total Minutes    24668 - PT Manual Therapy Minutes 10  -AS                User Key  (r) = Recorded By, (t) = Taken By, (c) = Cosigned By      Initials Name Provider Type    AS Devon Morales, PT Physical Therapist                             Outcome Measure Options: Quick DASH         Time Calculation:   Start Time: 0549  Stop Time: 0635  Time Calculation (min): 46 min  Timed Charges  03213 - PT Therapeutic Exercise Minutes: 25  90828 - PT Manual Therapy Minutes: 10  Untimed Charges  PT Moist Heat Minutes: 10  Total Minutes  Timed Charges Total Minutes: 35  Untimed Charges Total Minutes: 10   Total Minutes: 45  Therapy Charges for Today       Code Description Service Date Service Provider Modifiers Qty    53715389442  PT THER PROC EA 15 MIN 5/20/2025 Devon Morales, PT GP 2            PT G-Codes  Outcome Measure Options: Stacy Morales, PT  5/20/2025

## 2025-05-28 ENCOUNTER — OFFICE VISIT (OUTPATIENT)
Dept: ORTHOPEDIC SURGERY | Facility: CLINIC | Age: 55
End: 2025-05-28
Payer: COMMERCIAL

## 2025-05-28 VITALS — WEIGHT: 177 LBS | HEIGHT: 71 IN | BODY MASS INDEX: 24.78 KG/M2

## 2025-05-28 DIAGNOSIS — Z98.890 STATUS POST ARTHROSCOPY OF SHOULDER: Primary | ICD-10-CM

## 2025-05-28 DIAGNOSIS — F41.9 SEVERE ANXIETY: ICD-10-CM

## 2025-05-28 NOTE — PROGRESS NOTES
Subjective:     Patient ID: Denny Hairston is a 55 y.o. male.    Chief Complaint:  F/u s/p right shoulder rotator cuff repair, extensive debridement and biceps tenodesis  DOS 3/21/2025   History of Present Illness  Denny returns clinic today follow evaluation regards to his right shoulder, states he is making good progress at this point in time with his physical therapy.  Denies any numbness or tingling at this point in time, states his incisions are doing well.  Minimal pain noted at this point in time.  Social History     Occupational History     Employer: UNKNOWN EMPLOYER    Occupation: [employer]     Comment: [employer]   Tobacco Use    Smoking status: Former     Current packs/day: 0.00     Average packs/day: 0.3 packs/day for 15.0 years (3.8 ttl pk-yrs)     Types: Cigarettes     Start date:      Quit date:      Years since quittin.4     Passive exposure: Current    Smokeless tobacco: Former   Vaping Use    Vaping status: Every Day    Substances: Nicotine, Flavoring    Devices: Pre-filled pod    Passive vaping exposure: Yes   Substance and Sexual Activity    Alcohol use: Not Currently    Drug use: Yes     Types: Marijuana     Comment: fentanyl, heroin - last use 2023    Sexual activity: Defer      Past Medical History:   Diagnosis Date    Depression     Drug abuse     GERD (gastroesophageal reflux disease)     RENE (obstructive sleep apnea)     wears cpap     Past Surgical History:   Procedure Laterality Date    COLONOSCOPY W/ POLYPECTOMY N/A 2024    Procedure: COLONOSCOPY WITH POLYPECTOMY;  Surgeon: Rainer Doty MD;  Location: [facility] OR;  Service: Gastroenterology;  Laterality: N/A;  diverticulosis, ascending polyp x2, transverse polyp x2, descending polyp, sigmoid polyp    ENDOSCOPY N/A 2024    Procedure: ESOPHAGOGASTRODUODENOSCOPY WITH BIOPSY;  Surgeon: Rainer Doty MD;  Location: [facility] OR;  Service: Gastroenterology;  Laterality: N/A;  gastritis    SHOULDER  "ARTHROSCOPY W/ ROTATOR CUFF REPAIR Right 3/21/2025    Procedure: shoulder arthroscopy, rotator cuff debridement versus repair, possible biceps tenodesis, subacromial decompression, and all associated procedures;  Surgeon: Rohan Shaikh MD;  Location: [facility];  Service: Orthopedics;  Laterality: Right;       Family History   Problem Relation Age of Onset    Cancer Mother     Cancer Father     Cirrhosis Father     Liver disease Sister     Cancer Brother     Malig Hyperthermia Neg Hx          Review of Systems        Objective:  Vitals:    05/28/25 1500   Weight: 80.3 kg (177 lb)   Height: 180.3 cm (71\")         05/28/25  1500   Weight: 80.3 kg (177 lb)     Body mass index is 24.69 kg/m².    Physical Exam    Vital signs reviewed.   General: No acute distress, alert and oriented  Eyes: conjunctiva clear; pupils equally round and reactive  ENT: external ears and nose atraumatic; oropharynx clear  CV: no peripheral edema  Resp: normal respiratory effort  Skin: no rashes or wounds; normal turgor  Psych: mood and affect appropriate; recent and remote memory intact       Physical Exam  Right shoulder-active forward flexion 155 degrees, passive 165 degrees, 4- out of 5 strength, active external rotation 40 degrees, 4 out of 5 strength, internal rotation T12, 4+ out of 5 strength in belly press test.  Mild soft tissue swelling noted.  Incisions well-healed.        Imaging:  None today  Assessment:        1. s/p rotator cuff repair, biceps tenodesis, extensive debridement labrum, humeral head, subacromial bursa, DOS 3/21/2025             Assessment & Plan  B jose is doing very well at this point in time with his shoulder recovery, continue work with physical therapy.  Follow-up in 2 to 3 months for reassessment or sooner if needed.  continue working on external rotation stretching which is the tightest for him at this point in time as well as light strengthening.    Denny Hairston was in agreement with plan and had all " questions answered.     Orders:  No orders of the defined types were placed in this encounter.      Medications:  No orders of the defined types were placed in this encounter.      Followup:  No follow-ups on file.    Diagnoses and all orders for this visit:    1. s/p rotator cuff repair, biceps tenodesis, extensive debridement labrum, humeral head, subacromial bursa, DOS 3/21/2025 (Primary)        BMI is within normal parameters. No other follow-up for BMI required.          Dictated utilizing Dragon dictation     Patient or patient representative verbalized consent for the use of Ambient Listening during the visit with  Rohan Shaikh MD for chart documentation. 5/28/2025  15:07 EDT

## 2025-06-03 ENCOUNTER — HOSPITAL ENCOUNTER (OUTPATIENT)
Dept: PHYSICAL THERAPY | Facility: HOSPITAL | Age: 55
Setting detail: THERAPIES SERIES
Discharge: HOME | End: 2025-06-03
Payer: COMMERCIAL

## 2025-06-03 DIAGNOSIS — Z98.890 STATUS POST ARTHROSCOPY OF SHOULDER: Primary | ICD-10-CM

## 2025-06-03 PROCEDURE — 97110 THERAPEUTIC EXERCISES: CPT

## 2025-06-03 NOTE — THERAPY RE-EVALUATION
Outpatient Physical Therapy Ortho Re-Evaluation   [facility]     Patient Name: Denny Hairston  : 1970  MRN: [id number]  Today's Date: 6/3/2025      Visit Date: 2025    Patient Active Problem List   Diagnosis    Recurrent major depressive disorder, in full remission    Heroin use disorder, severe, in early remission    Positive colorectal cancer screening using Cologuard test    Obstructive sleep apnea    Adjustment insomnia    Gastroesophageal reflux disease    Methamphetamine abuse    Tobacco dependence syndrome    Cocaine use disorder    Primary insomnia    Traumatic tear of right rotator cuff    Subacromial bursitis of right shoulder joint    Biceps tendinitis of right upper extremity    s/p rotator cuff repair, biceps tenodesis, extensive debridement labrum, humeral head, subacromial bursa, DOS 3/21/2025        Past Medical History:   Diagnosis Date    Depression     Drug abuse     GERD (gastroesophageal reflux disease)     RENE (obstructive sleep apnea)     wears cpap        Past Surgical History:   Procedure Laterality Date    COLONOSCOPY W/ POLYPECTOMY N/A 2024    Procedure: COLONOSCOPY WITH POLYPECTOMY;  Surgeon: Rainer Doty MD;  Location: [facility];  Service: Gastroenterology;  Laterality: N/A;  diverticulosis, ascending polyp x2, transverse polyp x2, descending polyp, sigmoid polyp    ENDOSCOPY N/A 2024    Procedure: ESOPHAGOGASTRODUODENOSCOPY WITH BIOPSY;  Surgeon: Rainer Doty MD;  Location: [facility] OR;  Service: Gastroenterology;  Laterality: N/A;  gastritis    SHOULDER ARTHROSCOPY W/ ROTATOR CUFF REPAIR Right 3/21/2025    Procedure: shoulder arthroscopy, rotator cuff debridement versus repair, possible biceps tenodesis, subacromial decompression, and all associated procedures;  Surgeon: Rohan Shaikh MD;  Location: [facility] OR;  Service: Orthopedics;  Laterality: Right;       Visit Dx:     ICD-10-CM ICD-9-CM   1. s/p rotator cuff repair, biceps tenodesis, extensive  debridement labrum, humeral head, subacromial bursa, DOS 3/21/2025  Z98.890 V45.89              PT Ortho       Row Name 06/03/25 0600       Precautions and Contraindications    Precautions/Limitations shoulder precautions  [facility] RC Protocol, in pt chart  -AS       Subjective Pain    Able to rate subjective pain? yes  -AS    Pre-Treatment Pain Level 3  -AS    Post-Treatment Pain Level 3  -AS       Posture/Observations    Posture- WNL Posture is WNL  -AS    Observations Incision healing  -AS       Right Upper Ext    Rt Shoulder Abduction AROM 172  -AS    Rt Shoulder Abduction PROM WNL  -AS    Rt Shoulder Flexion AROM 175  -AS    Rt Shoulder Flexion PROM WNL  -AS    Rt Shoulder External Rotation PROM WNL  -AS    Rt Shoulder Internal Rotation PROM WNL  -AS       MMT Right Upper Ext    Rt Shoulder Flexion MMT, Gross Movement (3+/5) fair plus  -AS    Rt Shoulder ABduction MMT, Gross Movement (3+/5) fair plus  -AS    Rt Shoulder Internal Rotation MMT, Gross Movement (3+/5) fair plus  -AS    Rt Shoulder External Rotation MMT, Gross Movement (3+/5) fair plus  -AS       Sensation    Sensation WNL? WNL  -AS    Light Touch No apparent deficits  -AS              User Key  (r) = Recorded By, (t) = Taken By, (c) = Cosigned By      Initials Name Provider Type    AS Devon Morales, PT Physical Therapist                                       PT OP Goals       Row Name 06/03/25 0600          PT Short Term Goals    STG 1 Patient to demonstrate compliance with initial HEP for flexibility, ROM and strengthening.  -AS     STG 1 Progress Met  -AS     STG 2 Patient to report right shoulder pain on VAS of 4-5/10 at worst with activity.  -AS     STG 2 Progress Ongoing;Progressing  -AS     STG 3 Patient to demonstrate improved right shoulder strength to 4/5 in all planes.  -AS     STG 3 Progress Ongoing;Progressing  -AS     STG 4 Patient to demonstrate improved right shoulder PROM to within 10 degrees of contralateral shoulder.  -AS      STG 4 Progress Met  -AS        Long Term Goals    LTG 1 Patient to demonstrate compliance with advanced HEP for flexibility, ROM and strengthening.  -AS     LTG 1 Progress Ongoing;Progressing  -AS     LTG 2 Patient to report right shoulder pain on VAS of 1-2/10 at worst with activity.  -AS     LTG 2 Progress Ongoing;Progressing  -AS     LTG 3 Patient to demonstrate improved right shoulder strength to 4+/5 in all planes.  -AS     LTG 3 Progress Ongoing;Progressing  -AS     LTG 4 Patient to demonstrate improved right shoulder AROM to WFL in all planes.  -AS     LTG 4 Progress Ongoing;Progressing  -AS     LTG 5 Patient to report improved function and decreased pain Quick DASH by >10-15 points.  -AS     LTG 5 Progress Ongoing;Progressing  -AS               User Key  (r) = Recorded By, (t) = Taken By, (c) = Cosigned By      Initials Name Provider Type    AS Devon Morales, PT Physical Therapist                     PT Assessment/Plan       Row Name 06/03/25 0600          PT Assessment    Functional Limitations Limitation in home management;Limitations in community activities;Performance in leisure activities;Performance in sport activities;Performance in work activities  -AS     Impairments Impaired flexibility;Muscle strength;Pain;Range of motion  -AS     Assessment Comments Patient is now 10 weeks post right RC repair. Patient has been placed on the massive RC repair post op protocol. Overall patient is doing well and has good right shoulder ROM with limited strength and function. We will be able to introduce strengthening at week 12 per post op protocol. Plan to progress patient as tolerated and per protocol.  -AS     Please refer to paper survey for additional self-reported information Yes  -AS     Rehab Potential Good  -AS     Patient/caregiver participated in establishment of treatment plan and goals Yes  -AS     Patient would benefit from skilled therapy intervention Yes  -AS        PT Plan    PT  Frequency 1x/week;2x/week  -AS     Predicted Duration of Therapy Intervention (PT) 6-8 weeks  -AS     Planned CPT's? PT RE-EVAL: 45942;PT THER PROC EA 15 MIN: 92266;PT THER ACT EA 15 MIN: 04242;PT MANUAL THERAPY EA 15 MIN: 47656;PT NEUROMUSC RE-EDUCATION EA 15 MIN: 84462  -AS     PT Plan Comments Continue with current treatment plan.  -AS               User Key  (r) = Recorded By, (t) = Taken By, (c) = Cosigned By      Initials Name Provider Type    AS Devon Morales, PT Physical Therapist                     Modalities       Row Name 06/03/25 0600             Moist Heat    MH Applied Yes  -AS      Location Right Shoulder - Sitting  -AS      PT Moist Heat Minutes 10  -AS      MH Prior to Rx Yes  -AS         Functional Testing    Outcome Measure Options Quick DASH  -AS                User Key  (r) = Recorded By, (t) = Taken By, (c) = Cosigned By      Initials Name Provider Type    AS Devon Morales, PT Physical Therapist                   OP Exercises       Row Name 06/03/25 0644 06/03/25 0600          Subjective    Subjective Comments -- Patient states his shoulder is feeling better.  -AS        Subjective Pain    Able to rate subjective pain? -- yes  -AS     Pre-Treatment Pain Level -- 3  -AS     Post-Treatment Pain Level -- 3  -AS        Total Minutes    31177 - PT Therapeutic Exercise Minutes 25  -AS --     15607 - PT Manual Therapy Minutes 10  -AS --        Exercise 1    Exercise Name 1 -- 3-Way Cane  -AS     Reps 1 -- 15  -AS     Time 1 -- 5 sec hold each  -AS        Exercise 2    Exercise Name 2 -- Supine AROM for Flexion  -AS     Reps 2 -- 15  -AS        Exercise 3    Exercise Name 3 -- Sleeper Stretch  -AS        Exercise 4    Exercise Name 4 -- IR Towel Stretch  -AS        Exercise 5    Exercise Name 5 -- Pulley's - Flex & ABD  -AS     Time 5 -- 3 min each  -AS        Exercise 6    Exercise Name 6 -- Serratus Punches  -AS     Reps 6 -- 25  -AS        Exercise 7    Exercise Name 7 -- S/L ER   -AS     Reps 7 -- 25  -AS        Exercise 8    Exercise Name 8 -- Prone Y/T  -AS        Exercise 9    Exercise Name 9 -- Empty/Full Can  -AS     Reps 9 -- 25 - Full Can Only  -AS        Exercise 10    Exercise Name 10 -- Rows  -AS        Exercise 11    Exercise Name 11 -- Extensions  -AS        Exercise 12    Exercise Name 12 -- IR  -AS        Exercise 13    Exercise Name 13 -- ER  -AS               User Key  (r) = Recorded By, (t) = Taken By, (c) = Cosigned By      Initials Name Provider Type    AS Devon Morales, PT Physical Therapist                  Manual Rx (Last 36 Hours)       Manual Treatments       Row Name 06/03/25 0644 06/03/25 0500          Total Minutes    75201 - PT Manual Therapy Minutes 10  -AS --        Manual Rx 1    Manual Rx 1 Location -- Right Shoulder  -AS     Manual Rx 1 Type -- PROM - Flex, ABD, IR, ER  -AS     Manual Rx 1 Duration -- 10 min  -AS               User Key  (r) = Recorded By, (t) = Taken By, (c) = Cosigned By      Initials Name Provider Type    AS Devon Morales, PT Physical Therapist                                Outcome Measure Options: Quick DASH         Time Calculation:     Start Time: 0558  Stop Time: 0644  Time Calculation (min): 46 min  Timed Charges  88831 - PT Therapeutic Exercise Minutes: 25  69020 - PT Manual Therapy Minutes: 10  Untimed Charges  PT Moist Heat Minutes: 10  Total Minutes  Timed Charges Total Minutes: 35  Untimed Charges Total Minutes: 10   Total Minutes: 45     Therapy Charges for Today       Code Description Service Date Service Provider Modifiers Qty    70513162133 HC PT THER PROC EA 15 MIN 6/3/2025 Devon Morales, PT GP 3            PT G-Codes  Outcome Measure Options: Stacy Morales, PT  6/3/2025

## 2025-06-17 ENCOUNTER — HOSPITAL ENCOUNTER (OUTPATIENT)
Dept: PHYSICAL THERAPY | Facility: HOSPITAL | Age: 55
Setting detail: THERAPIES SERIES
Discharge: HOME | End: 2025-06-17
Payer: COMMERCIAL

## 2025-06-17 DIAGNOSIS — Z98.890 STATUS POST ARTHROSCOPY OF SHOULDER: Primary | ICD-10-CM

## 2025-06-17 PROCEDURE — 97140 MANUAL THERAPY 1/> REGIONS: CPT

## 2025-06-17 PROCEDURE — 97110 THERAPEUTIC EXERCISES: CPT

## 2025-06-17 NOTE — THERAPY TREATMENT NOTE
Outpatient Physical Therapy Ortho Treatment Note   [facility]     Patient Name: Denny Hairston  : 1970  MRN: [id number]  Today's Date: 2025      Visit Date: 2025    Visit Dx:    ICD-10-CM ICD-9-CM   1. s/p rotator cuff repair, biceps tenodesis, extensive debridement labrum, humeral head, subacromial bursa, DOS 3/21/2025  Z98.890 V45.89       Patient Active Problem List   Diagnosis    Recurrent major depressive disorder, in full remission    Heroin use disorder, severe, in early remission    Positive colorectal cancer screening using Cologuard test    Obstructive sleep apnea    Adjustment insomnia    Gastroesophageal reflux disease    Methamphetamine abuse    Tobacco dependence syndrome    Cocaine use disorder    Primary insomnia    Traumatic tear of right rotator cuff    Subacromial bursitis of right shoulder joint    Biceps tendinitis of right upper extremity    s/p rotator cuff repair, biceps tenodesis, extensive debridement labrum, humeral head, subacromial bursa, DOS 3/21/2025        Past Medical History:   Diagnosis Date    Depression     Drug abuse     GERD (gastroesophageal reflux disease)     RENE (obstructive sleep apnea)     wears cpap        Past Surgical History:   Procedure Laterality Date    COLONOSCOPY W/ POLYPECTOMY N/A 2024    Procedure: COLONOSCOPY WITH POLYPECTOMY;  Surgeon: Rainer Doty MD;  Location: [facility] OR;  Service: Gastroenterology;  Laterality: N/A;  diverticulosis, ascending polyp x2, transverse polyp x2, descending polyp, sigmoid polyp    ENDOSCOPY N/A 2024    Procedure: ESOPHAGOGASTRODUODENOSCOPY WITH BIOPSY;  Surgeon: Rainer Doty MD;  Location: [facility] OR;  Service: Gastroenterology;  Laterality: N/A;  gastritis    SHOULDER ARTHROSCOPY W/ ROTATOR CUFF REPAIR Right 3/21/2025    Procedure: shoulder arthroscopy, rotator cuff debridement versus repair, possible biceps tenodesis, subacromial decompression, and all associated procedures;  Surgeon:  Rohan Shaikh MD;  Location: [facility];  Service: Orthopedics;  Laterality: Right;                        PT Assessment/Plan       Row Name 06/17/25 0600          PT Assessment    Assessment Comments Patient is now 12 weeks post op. Strengthening exercises were introduced today and patient tolerated this well. Plan to continue to progress patient as tolerated.  -AS        PT Plan    PT Plan Comments Continue with current treatment plan.  -AS               User Key  (r) = Recorded By, (t) = Taken By, (c) = Cosigned By      Initials Name Provider Type    AS Devon Morales, PT Physical Therapist                     Modalities       Row Name 06/17/25 0600             Moist Heat    MH Applied Yes  -AS      Location Right Shoulder - Sitting  -AS      PT Moist Heat Minutes 10  -AS      MH Prior to Rx Yes  -AS         Functional Testing    Outcome Measure Options Quick DASH  -AS                User Key  (r) = Recorded By, (t) = Taken By, (c) = Cosigned By      Initials Name Provider Type    AS Devon Morales, PT Physical Therapist                   OP Exercises       Row Name 06/17/25 0657 06/17/25 0600          Subjective    Subjective Comments -- Patient states his shoulder is doing ok this morning.  -AS        Total Minutes    23464 - PT Therapeutic Exercise Minutes 35  -AS --     79572 - PT Manual Therapy Minutes 10  -AS --        Exercise 1    Exercise Name 1 -- 3-Way Cane  -AS     Reps 1 -- 15  -AS     Time 1 -- 5 sec hold each  -AS        Exercise 2    Exercise Name 2 -- Supine AROM for Flexion  -AS     Reps 2 -- 15  -AS        Exercise 3    Exercise Name 3 -- Sleeper Stretch  -AS        Exercise 4    Exercise Name 4 -- IR Towel Stretch  -AS        Exercise 5    Exercise Name 5 -- Pulley's - Flex & ABD  -AS     Time 5 -- 3 min each  -AS        Exercise 6    Exercise Name 6 -- Serratus Punches  -AS     Reps 6 -- 25  -AS        Exercise 7    Exercise Name 7 -- S/L ER  -AS     Reps 7 -- 25  -AS         Exercise 8    Exercise Name 8 -- Prone Y/T  -AS        Exercise 9    Exercise Name 9 -- Empty/Full Can  -AS     Reps 9 -- 25 - Full Can Only  -AS        Exercise 10    Exercise Name 10 -- Rows  -AS     Reps 10 -- 25  -AS     Time 10 -- Green  -AS        Exercise 11    Exercise Name 11 -- Extensions  -AS     Reps 11 -- 25  -AS     Time 11 -- Green  -AS        Exercise 12    Exercise Name 12 -- IR  -AS     Reps 12 -- 25  -AS     Time 12 -- Green  -AS        Exercise 13    Exercise Name 13 -- ER  -AS     Reps 13 -- 25  -AS     Time 13 -- Green  -AS               User Key  (r) = Recorded By, (t) = Taken By, (c) = Cosigned By      Initials Name Provider Type    AS Devon Morales, PT Physical Therapist                             Manual Rx (Last 36 Hours)       Manual Treatments       Row Name 06/17/25 0657 06/17/25 0500          Total Minutes    00218 - PT Manual Therapy Minutes 10  -AS --        Manual Rx 1    Manual Rx 1 Location -- Right Shoulder  -AS     Manual Rx 1 Type -- PROM - Flex, ABD, IR, ER  -AS     Manual Rx 1 Duration -- 10 min  -AS               User Key  (r) = Recorded By, (t) = Taken By, (c) = Cosigned By      Initials Name Provider Type    AS Devon Morales, PT Physical Therapist                             Outcome Measure Options: Quick DASH         Time Calculation:   Start Time: 0600  Stop Time: 0657  Time Calculation (min): 57 min  Timed Charges  27635 - PT Therapeutic Exercise Minutes: 35  84254 - PT Manual Therapy Minutes: 10  Untimed Charges  PT Moist Heat Minutes: 10  Total Minutes  Timed Charges Total Minutes: 45  Untimed Charges Total Minutes: 10   Total Minutes: 55  Therapy Charges for Today       Code Description Service Date Service Provider Modifiers Qty    74498359262 HC PT THER PROC EA 15 MIN 6/17/2025 Devon Morales, PT GP 2    77849342908 HC PT MANUAL THERAPY EA 15 MIN 6/17/2025 Devon Morales, PT GP 1            PT G-Codes  Outcome Measure Options: Quick  SALAS Morales, PT  6/17/2025

## 2025-06-24 ENCOUNTER — HOSPITAL ENCOUNTER (OUTPATIENT)
Dept: PHYSICAL THERAPY | Facility: HOSPITAL | Age: 55
Setting detail: THERAPIES SERIES
Discharge: HOME | End: 2025-06-24
Payer: COMMERCIAL

## 2025-06-24 DIAGNOSIS — Z98.890 STATUS POST ARTHROSCOPY OF SHOULDER: Primary | ICD-10-CM

## 2025-06-24 PROCEDURE — 97110 THERAPEUTIC EXERCISES: CPT

## 2025-06-24 NOTE — THERAPY TREATMENT NOTE
Outpatient Physical Therapy Ortho Treatment Note   [facility]     Patient Name: Denny Hairston  : 1970  MRN: [id number]  Today's Date: 2025      Visit Date: 2025    Visit Dx:    ICD-10-CM ICD-9-CM   1. s/p rotator cuff repair, biceps tenodesis, extensive debridement labrum, humeral head, subacromial bursa, DOS 3/21/2025  Z98.890 V45.89       Patient Active Problem List   Diagnosis    Recurrent major depressive disorder, in full remission    Heroin use disorder, severe, in early remission    Positive colorectal cancer screening using Cologuard test    Obstructive sleep apnea    Adjustment insomnia    Gastroesophageal reflux disease    Methamphetamine abuse    Tobacco dependence syndrome    Cocaine use disorder    Primary insomnia    Traumatic tear of right rotator cuff    Subacromial bursitis of right shoulder joint    Biceps tendinitis of right upper extremity    s/p rotator cuff repair, biceps tenodesis, extensive debridement labrum, humeral head, subacromial bursa, DOS 3/21/2025        Past Medical History:   Diagnosis Date    Depression     Drug abuse     GERD (gastroesophageal reflux disease)     RENE (obstructive sleep apnea)     wears cpap        Past Surgical History:   Procedure Laterality Date    COLONOSCOPY W/ POLYPECTOMY N/A 2024    Procedure: COLONOSCOPY WITH POLYPECTOMY;  Surgeon: Rainer Doty MD;  Location: [facility] OR;  Service: Gastroenterology;  Laterality: N/A;  diverticulosis, ascending polyp x2, transverse polyp x2, descending polyp, sigmoid polyp    ENDOSCOPY N/A 2024    Procedure: ESOPHAGOGASTRODUODENOSCOPY WITH BIOPSY;  Surgeon: Rainer Doty MD;  Location: [facility] OR;  Service: Gastroenterology;  Laterality: N/A;  gastritis    SHOULDER ARTHROSCOPY W/ ROTATOR CUFF REPAIR Right 3/21/2025    Procedure: shoulder arthroscopy, rotator cuff debridement versus repair, possible biceps tenodesis, subacromial decompression, and all associated procedures;  Surgeon:  Rohan Shaikh MD;  Location: [facility];  Service: Orthopedics;  Laterality: Right;                        PT Assessment/Plan       Row Name 06/24/25 0600          PT Assessment    Assessment Comments Progressed patient with strengthening exercises today and he tolerated this well. Patient continues to have good right shoulder ROM. Plan to continue to progress patient as tolerated.  -AS        PT Plan    PT Plan Comments Continue with current treatment plan.  -AS               User Key  (r) = Recorded By, (t) = Taken By, (c) = Cosigned By      Initials Name Provider Type    AS Devon Morales, PT Physical Therapist                     Modalities       Row Name 06/24/25 0600             Moist Heat    MH Applied Yes  -AS      Location Right Shoulder - Sitting  -AS      PT Moist Heat Minutes 10  -AS      MH Prior to Rx Yes  -AS         Functional Testing    Outcome Measure Options Quick DASH  -AS                User Key  (r) = Recorded By, (t) = Taken By, (c) = Cosigned By      Initials Name Provider Type    AS Devon Morales, PT Physical Therapist                   OP Exercises       Row Name 06/24/25 0650 06/24/25 0600          Subjective    Subjective Comments -- Patient states his shoulder is doing well.  -AS        Total Minutes    70662 - PT Therapeutic Exercise Minutes 40  -AS --        Exercise 1    Exercise Name 1 -- 3-Way Cane  -AS     Reps 1 -- 15  -AS     Time 1 -- 5 sec hold each  -AS        Exercise 2    Exercise Name 2 -- Supine AROM for Flexion  -AS     Reps 2 -- 15  -AS        Exercise 3    Exercise Name 3 -- Sleeper Stretch  -AS        Exercise 4    Exercise Name 4 -- IR Towel Stretch  -AS        Exercise 5    Exercise Name 5 -- Pulley's - Flex & ABD  -AS     Time 5 -- 3 min each  -AS        Exercise 6    Exercise Name 6 -- Serratus Punches  -AS     Reps 6 -- 25  -AS     Time 6 -- 1#  -AS        Exercise 7    Exercise Name 7 -- S/L ER  -AS     Reps 7 -- 25  -AS     Time 7 -- 1#  -AS         Exercise 8    Exercise Name 8 -- Prone Y/T  -AS        Exercise 9    Exercise Name 9 -- Empty/Full Can  -AS     Reps 9 -- 25 each  -AS     Time 9 -- 1#  -AS        Exercise 10    Exercise Name 10 -- Rows  -AS     Reps 10 -- 25  -AS     Time 10 -- Green  -AS        Exercise 11    Exercise Name 11 -- Extensions  -AS     Reps 11 -- 25  -AS     Time 11 -- Green  -AS        Exercise 12    Exercise Name 12 -- IR  -AS     Reps 12 -- 25  -AS     Time 12 -- Green  -AS        Exercise 13    Exercise Name 13 -- ER  -AS     Reps 13 -- 25  -AS     Time 13 -- Green  -AS               User Key  (r) = Recorded By, (t) = Taken By, (c) = Cosigned By      Initials Name Provider Type    AS Devon Morales, PT Physical Therapist                             Manual Rx (Last 36 Hours)       Manual Treatments       Row Name 06/24/25 0500             Manual Rx 1    Manual Rx 1 Location --  -AS      Manual Rx 1 Type --  -AS      Manual Rx 1 Duration --  -AS                User Key  (r) = Recorded By, (t) = Taken By, (c) = Cosigned By      Initials Name Provider Type    AS Devon Morales, PT Physical Therapist                             Outcome Measure Options: Quick DASH         Time Calculation:   Start Time: 0600  Stop Time: 0650  Time Calculation (min): 50 min  Timed Charges  67610 - PT Therapeutic Exercise Minutes: 40  Untimed Charges  PT Moist Heat Minutes: 10  Total Minutes  Timed Charges Total Minutes: 40  Untimed Charges Total Minutes: 10   Total Minutes: 50  Therapy Charges for Today       Code Description Service Date Service Provider Modifiers Qty    [id number]  PT THER PROC EA 15 MIN 6/24/2025 Devon Morales, PT GP 3            PT G-Codes  Outcome Measure Options: Quick SALAS Morales, PT  6/24/2025

## 2025-07-01 ENCOUNTER — HOSPITAL ENCOUNTER (OUTPATIENT)
Dept: PHYSICAL THERAPY | Facility: HOSPITAL | Age: 55
Setting detail: THERAPIES SERIES
Discharge: HOME | End: 2025-07-01
Payer: COMMERCIAL

## 2025-07-01 DIAGNOSIS — Z98.890 STATUS POST ARTHROSCOPY OF SHOULDER: Primary | ICD-10-CM

## 2025-07-01 PROCEDURE — 97110 THERAPEUTIC EXERCISES: CPT

## 2025-07-01 NOTE — THERAPY TREATMENT NOTE
Outpatient Physical Therapy Ortho Treatment Note   [facility]     Patient Name: Denny Hairston  : 1970  MRN: [id number]  Today's Date: 2025      Visit Date: 2025    Visit Dx:    ICD-10-CM ICD-9-CM   1. s/p rotator cuff repair, biceps tenodesis, extensive debridement labrum, humeral head, subacromial bursa, DOS 3/21/2025  Z98.890 V45.89       Patient Active Problem List   Diagnosis    Recurrent major depressive disorder, in full remission    Heroin use disorder, severe, in early remission    Positive colorectal cancer screening using Cologuard test    Obstructive sleep apnea    Adjustment insomnia    Gastroesophageal reflux disease    Methamphetamine abuse    Tobacco dependence syndrome    Cocaine use disorder    Primary insomnia    Traumatic tear of right rotator cuff    Subacromial bursitis of right shoulder joint    Biceps tendinitis of right upper extremity    s/p rotator cuff repair, biceps tenodesis, extensive debridement labrum, humeral head, subacromial bursa, DOS 3/21/2025        Past Medical History:   Diagnosis Date    Depression     Drug abuse     GERD (gastroesophageal reflux disease)     RENE (obstructive sleep apnea)     wears cpap        Past Surgical History:   Procedure Laterality Date    COLONOSCOPY W/ POLYPECTOMY N/A 2024    Procedure: COLONOSCOPY WITH POLYPECTOMY;  Surgeon: Rainer Doty MD;  Location: [facility] OR;  Service: Gastroenterology;  Laterality: N/A;  diverticulosis, ascending polyp x2, transverse polyp x2, descending polyp, sigmoid polyp    ENDOSCOPY N/A 2024    Procedure: ESOPHAGOGASTRODUODENOSCOPY WITH BIOPSY;  Surgeon: Rainer Doty MD;  Location: [facility] OR;  Service: Gastroenterology;  Laterality: N/A;  gastritis    SHOULDER ARTHROSCOPY W/ ROTATOR CUFF REPAIR Right 3/21/2025    Procedure: shoulder arthroscopy, rotator cuff debridement versus repair, possible biceps tenodesis, subacromial decompression, and all associated procedures;  Surgeon:  Rohan Shaikh MD;  Location: [facility];  Service: Orthopedics;  Laterality: Right;                        PT Assessment/Plan       Row Name 07/01/25 0600          PT Assessment    Assessment Comments Progressed patient with strengthening exercises today and he tolerated this well. Patient continues to have good right shoulder ROM. Plan to continue to progress patient as tolerated.  -AS        PT Plan    PT Plan Comments Continue with current treatment plan.  -AS               User Key  (r) = Recorded By, (t) = Taken By, (c) = Cosigned By      Initials Name Provider Type    AS Devon Morales, PT Physical Therapist                     Modalities       Row Name 07/01/25 0600             Moist Heat    MH Applied Yes  -AS      Location Right Shoulder - Sitting  -AS      PT Moist Heat Minutes 10  -AS      MH Prior to Rx Yes  -AS         Functional Testing    Outcome Measure Options Quick DASH  -AS                User Key  (r) = Recorded By, (t) = Taken By, (c) = Cosigned By      Initials Name Provider Type    AS Devon Morales, PT Physical Therapist                   OP Exercises       Row Name 07/01/25 0651 07/01/25 0600          Subjective    Subjective Comments -- Patient states his shoulder continues to do well and is improving. He states he is scheduled to see his Ortho on 7-28-25.  -AS        Total Minutes    13979 - PT Therapeutic Exercise Minutes 40  -AS --        Exercise 1    Exercise Name 1 -- 3-Way Cane  -AS     Reps 1 -- 15  -AS     Time 1 -- 5 sec hold each  -AS        Exercise 2    Exercise Name 2 -- Supine AROM for Flexion  -AS     Reps 2 -- 15  -AS        Exercise 3    Exercise Name 3 -- Sleeper Stretch  -AS        Exercise 4    Exercise Name 4 -- IR Towel Stretch  -AS        Exercise 5    Exercise Name 5 -- Pulley's - Flex & ABD  -AS     Time 5 -- 3 min each  -AS        Exercise 6    Exercise Name 6 -- Serratus Punches  -AS     Reps 6 -- 30  -AS     Time 6 -- 1#  -AS        Exercise 7     Exercise Name 7 -- S/L ER  -AS     Reps 7 -- 30  -AS     Time 7 -- 1#  -AS        Exercise 8    Exercise Name 8 -- Prone Y/T  -AS        Exercise 9    Exercise Name 9 -- Empty/Full Can  -AS     Reps 9 -- 30 each  -AS     Time 9 -- 1#  -AS        Exercise 10    Exercise Name 10 -- Rows  -AS     Reps 10 -- 25  -AS     Time 10 -- Blue  -AS        Exercise 11    Exercise Name 11 -- Extensions  -AS     Reps 11 -- 25  -AS     Time 11 -- Blue  -AS        Exercise 12    Exercise Name 12 -- IR  -AS     Reps 12 -- 25  -AS     Time 12 -- Blue  -AS        Exercise 13    Exercise Name 13 -- ER  -AS     Reps 13 -- 25  -AS     Time 13 -- Blue  -AS               User Key  (r) = Recorded By, (t) = Taken By, (c) = Cosigned By      Initials Name Provider Type    AS Devon Morales, PT Physical Therapist                                          Outcome Measure Options: Quick DASH         Time Calculation:   Start Time: 0600  Stop Time: 0651  Time Calculation (min): 51 min  Timed Charges  56637 - PT Therapeutic Exercise Minutes: 40  Untimed Charges  PT Moist Heat Minutes: 10  Total Minutes  Timed Charges Total Minutes: 40  Untimed Charges Total Minutes: 10   Total Minutes: 50  Therapy Charges for Today       Code Description Service Date Service Provider Modifiers Qty    [id number]  PT THER PROC EA 15 MIN 7/1/2025 Devon Morales, PT GP 3            PT G-Codes  Outcome Measure Options: Stacy Morales, PT  7/1/2025

## 2025-07-08 ENCOUNTER — HOSPITAL ENCOUNTER (OUTPATIENT)
Dept: PHYSICAL THERAPY | Facility: HOSPITAL | Age: 55
Setting detail: THERAPIES SERIES
Discharge: HOME | End: 2025-07-08
Payer: COMMERCIAL

## 2025-07-08 DIAGNOSIS — Z98.890 STATUS POST ARTHROSCOPY OF SHOULDER: Primary | ICD-10-CM

## 2025-07-08 PROCEDURE — 97110 THERAPEUTIC EXERCISES: CPT

## 2025-07-08 NOTE — THERAPY RE-EVALUATION
Outpatient Physical Therapy Ortho Re-Evaluation   [facility]     Patient Name: Denny Hairston  : 1970  MRN: [id number]  Today's Date: 2025      Visit Date: 2025    Patient Active Problem List   Diagnosis    Recurrent major depressive disorder, in full remission    Heroin use disorder, severe, in early remission    Positive colorectal cancer screening using Cologuard test    Obstructive sleep apnea    Adjustment insomnia    Gastroesophageal reflux disease    Methamphetamine abuse    Tobacco dependence syndrome    Cocaine use disorder    Primary insomnia    Traumatic tear of right rotator cuff    Subacromial bursitis of right shoulder joint    Biceps tendinitis of right upper extremity    s/p rotator cuff repair, biceps tenodesis, extensive debridement labrum, humeral head, subacromial bursa, DOS 3/21/2025        Past Medical History:   Diagnosis Date    Depression     Drug abuse     GERD (gastroesophageal reflux disease)     RENE (obstructive sleep apnea)     wears cpap        Past Surgical History:   Procedure Laterality Date    COLONOSCOPY W/ POLYPECTOMY N/A 2024    Procedure: COLONOSCOPY WITH POLYPECTOMY;  Surgeon: Rainer Doty MD;  Location: [facility];  Service: Gastroenterology;  Laterality: N/A;  diverticulosis, ascending polyp x2, transverse polyp x2, descending polyp, sigmoid polyp    ENDOSCOPY N/A 2024    Procedure: ESOPHAGOGASTRODUODENOSCOPY WITH BIOPSY;  Surgeon: Rainer Doty MD;  Location: [facility] OR;  Service: Gastroenterology;  Laterality: N/A;  gastritis    SHOULDER ARTHROSCOPY W/ ROTATOR CUFF REPAIR Right 3/21/2025    Procedure: shoulder arthroscopy, rotator cuff debridement versus repair, possible biceps tenodesis, subacromial decompression, and all associated procedures;  Surgeon: Rohan Shaikh MD;  Location: [facility] OR;  Service: Orthopedics;  Laterality: Right;       Visit Dx:     ICD-10-CM ICD-9-CM   1. s/p rotator cuff repair, biceps tenodesis, extensive  debridement labrum, humeral head, subacromial bursa, DOS 3/21/2025  Z98.890 V45.89              PT Ortho       Row Name 07/08/25 0600       Precautions and Contraindications    Precautions/Limitations no known precautions/limitations  -AS       Subjective Pain    Able to rate subjective pain? yes  -AS    Pre-Treatment Pain Level 0  -AS    Post-Treatment Pain Level 0  -AS       Posture/Observations    Posture- WNL Posture is WNL  -AS    Observations Incision healing  -AS       Right Upper Ext    Rt Shoulder Abduction AROM 175  -AS    Rt Shoulder Abduction PROM WNL  -AS    Rt Shoulder Flexion AROM 175  -AS    Rt Shoulder Flexion PROM WNL  -AS    Rt Shoulder External Rotation PROM WNL  -AS    Rt Shoulder Internal Rotation PROM WNL  -AS       MMT Right Upper Ext    Rt Shoulder Flexion MMT, Gross Movement (4/5) good  -AS    Rt Shoulder ABduction MMT, Gross Movement (4/5) good  -AS    Rt Shoulder Internal Rotation MMT, Gross Movement (4/5) good  -AS    Rt Shoulder External Rotation MMT, Gross Movement (4/5) good  -AS       Sensation    Sensation WNL? WNL  -AS    Light Touch No apparent deficits  -AS              User Key  (r) = Recorded By, (t) = Taken By, (c) = Cosigned By      Initials Name Provider Type    AS Devon Morales, PT Physical Therapist                                       PT OP Goals       Row Name 07/08/25 0600          PT Short Term Goals    STG 1 Patient to demonstrate compliance with initial HEP for flexibility, ROM and strengthening.  -AS     STG 1 Progress Met  -AS     STG 2 Patient to report right shoulder pain on VAS of 4-5/10 at worst with activity.  -AS     STG 2 Progress Met  -AS     STG 3 Patient to demonstrate improved right shoulder strength to 4+/5 in all planes.  -AS     STG 3 Progress New;Ongoing;Progressing  -AS     STG 4 Patient to demonstrate improved right shoulder PROM to within 10 degrees of contralateral shoulder.  -AS     STG 4 Progress Met  -AS        Long Term Goals    LTG  1 Patient to demonstrate compliance with advanced HEP for flexibility, ROM and strengthening.  -AS     LTG 1 Progress Met  -AS     LTG 2 Patient to report right shoulder pain on VAS of 1-2/10 at worst with activity.  -AS     LTG 2 Progress Ongoing;Progressing  -AS     LTG 3 Patient to demonstrate improved right shoulder strength to 5/5 in all planes.  -AS     LTG 3 Progress New;Ongoing;Progressing  -AS     LTG 4 Patient to demonstrate improved right shoulder AROM to WFL in all planes.  -AS     LTG 4 Progress Met  -AS     LTG 5 Patient to report improved function and decreased pain Quick DASH by >10-15 points.  -AS     LTG 5 Progress Met  -AS               User Key  (r) = Recorded By, (t) = Taken By, (c) = Cosigned By      Initials Name Provider Type    AS Devon Morales, PT Physical Therapist                     PT Assessment/Plan       Row Name 07/08/25 0600          PT Assessment    Functional Limitations Limitation in home management;Limitations in community activities;Performance in leisure activities;Performance in sport activities;Performance in work activities  -AS     Impairments Impaired flexibility;Muscle strength;Pain;Range of motion  -AS     Assessment Comments Patient is now 15+ weeks post right RC repair. Patient has been placed on the massive RC repair post op protocol. Overall patient is doing well and has good right shoulder ROM with improving strength and function. Plan to continue to progress patient with strengthening exercises as tolerated.  -AS     Please refer to paper survey for additional self-reported information Yes  -AS     Rehab Potential Good  -AS     Patient/caregiver participated in establishment of treatment plan and goals Yes  -AS     Patient would benefit from skilled therapy intervention Yes  -AS        PT Plan    PT Frequency 1x/week  -AS     Predicted Duration of Therapy Intervention (PT) 4 weeks  -AS     Planned CPT's? PT RE-EVAL: 34729;PT THER PROC EA 15 MIN: 45467;PT  THER ACT EA 15 MIN: 64026;PT MANUAL THERAPY EA 15 MIN: 44425;PT NEUROMUSC RE-EDUCATION EA 15 MIN: 92468  -AS     PT Plan Comments Continue with current treatment plan.  -AS               User Key  (r) = Recorded By, (t) = Taken By, (c) = Cosigned By      Initials Name Provider Type    AS Devon Morales, PT Physical Therapist                     Modalities       Row Name 07/08/25 0600             Moist Heat    MH Applied Yes  -AS      Location Right Shoulder - Sitting  -AS      PT Moist Heat Minutes 10  -AS      MH Prior to Rx Yes  -AS         Functional Testing    Outcome Measure Options Quick DASH  -AS                User Key  (r) = Recorded By, (t) = Taken By, (c) = Cosigned By      Initials Name Provider Type    AS Devon Morales, PT Physical Therapist                   OP Exercises       Row Name 07/08/25 0649 07/08/25 0600          Subjective    Subjective Comments -- Patient states his shoulder is doing well.  -AS        Subjective Pain    Able to rate subjective pain? -- yes  -AS     Pre-Treatment Pain Level -- 0  -AS     Post-Treatment Pain Level -- 0  -AS        Total Minutes    92943 - PT Therapeutic Exercise Minutes 40  -AS --        Exercise 1    Exercise Name 1 -- 3-Way Cane  -AS     Reps 1 -- 15  -AS     Time 1 -- 5 sec hold each  -AS        Exercise 2    Exercise Name 2 -- Supine AROM for Flexion  -AS     Reps 2 -- 15  -AS        Exercise 3    Exercise Name 3 -- Sleeper Stretch  -AS        Exercise 4    Exercise Name 4 -- IR Towel Stretch  -AS        Exercise 5    Exercise Name 5 -- Pulley's - Flex & ABD  -AS     Time 5 -- 3 min each  -AS        Exercise 6    Exercise Name 6 -- Serratus Punches  -AS     Reps 6 -- 25  -AS     Time 6 -- 2#  -AS        Exercise 7    Exercise Name 7 -- S/L ER  -AS     Reps 7 -- 25  -AS     Time 7 -- 2#  -AS        Exercise 8    Exercise Name 8 -- Prone Y/T  -AS        Exercise 9    Exercise Name 9 -- Empty/Full Can  -AS     Reps 9 -- 25 each  -AS     Time 9  -- 2#  -AS        Exercise 10    Exercise Name 10 -- Rows  -AS     Reps 10 -- 30  -AS     Time 10 -- Blue  -AS        Exercise 11    Exercise Name 11 -- Extensions  -AS     Reps 11 -- 30  -AS     Time 11 -- Blue  -AS        Exercise 12    Exercise Name 12 -- IR  -AS     Reps 12 -- 30  -AS     Time 12 -- Blue  -AS        Exercise 13    Exercise Name 13 -- ER  -AS     Reps 13 -- 30  -AS     Time 13 -- Blue  -AS               User Key  (r) = Recorded By, (t) = Taken By, (c) = Cosigned By      Initials Name Provider Type    AS Devon Morales, PT Physical Therapist                                  Outcome Measure Options: Quick DASH         Time Calculation:     Start Time: 0558  Stop Time: 0649  Time Calculation (min): 51 min  Timed Charges  81815 - PT Therapeutic Exercise Minutes: 40  Untimed Charges  PT Moist Heat Minutes: 10  Total Minutes  Timed Charges Total Minutes: 40  Untimed Charges Total Minutes: 10   Total Minutes: 50     Therapy Charges for Today       Code Description Service Date Service Provider Modifiers Qty    56736053417 HC PT THER PROC EA 15 MIN 7/8/2025 Devon Morales, PT GP 3            PT G-Codes  Outcome Measure Options: Stacy Morales, PT  7/8/2025

## 2025-07-15 ENCOUNTER — HOSPITAL ENCOUNTER (OUTPATIENT)
Dept: PHYSICAL THERAPY | Facility: HOSPITAL | Age: 55
Setting detail: THERAPIES SERIES
Discharge: HOME | End: 2025-07-15
Payer: COMMERCIAL

## 2025-07-15 DIAGNOSIS — Z98.890 STATUS POST ARTHROSCOPY OF SHOULDER: Primary | ICD-10-CM

## 2025-07-15 PROCEDURE — 97110 THERAPEUTIC EXERCISES: CPT

## 2025-07-15 NOTE — THERAPY TREATMENT NOTE
Outpatient Physical Therapy Ortho Treatment Note   [facility]     Patient Name: Denny Hairston  : 1970  MRN: [id number]  Today's Date: 7/15/2025      Visit Date: 07/15/2025    Visit Dx:    ICD-10-CM ICD-9-CM   1. s/p rotator cuff repair, biceps tenodesis, extensive debridement labrum, humeral head, subacromial bursa, DOS 3/21/2025  Z98.890 V45.89       Patient Active Problem List   Diagnosis    Recurrent major depressive disorder, in full remission    Heroin use disorder, severe, in early remission    Positive colorectal cancer screening using Cologuard test    Obstructive sleep apnea    Adjustment insomnia    Gastroesophageal reflux disease    Methamphetamine abuse    Tobacco dependence syndrome    Cocaine use disorder    Primary insomnia    Traumatic tear of right rotator cuff    Subacromial bursitis of right shoulder joint    Biceps tendinitis of right upper extremity    s/p rotator cuff repair, biceps tenodesis, extensive debridement labrum, humeral head, subacromial bursa, DOS 3/21/2025        Past Medical History:   Diagnosis Date    Depression     Drug abuse     GERD (gastroesophageal reflux disease)     RENE (obstructive sleep apnea)     wears cpap        Past Surgical History:   Procedure Laterality Date    COLONOSCOPY W/ POLYPECTOMY N/A 2024    Procedure: COLONOSCOPY WITH POLYPECTOMY;  Surgeon: Rainer Doty MD;  Location: [facility] OR;  Service: Gastroenterology;  Laterality: N/A;  diverticulosis, ascending polyp x2, transverse polyp x2, descending polyp, sigmoid polyp    ENDOSCOPY N/A 2024    Procedure: ESOPHAGOGASTRODUODENOSCOPY WITH BIOPSY;  Surgeon: Rainer Doty MD;  Location: [facility] OR;  Service: Gastroenterology;  Laterality: N/A;  gastritis    SHOULDER ARTHROSCOPY W/ ROTATOR CUFF REPAIR Right 3/21/2025    Procedure: shoulder arthroscopy, rotator cuff debridement versus repair, possible biceps tenodesis, subacromial decompression, and all associated procedures;  Surgeon:  Rohan Shaikh MD;  Location: [facility];  Service: Orthopedics;  Laterality: Right;                        PT Assessment/Plan       Row Name 07/15/25 0600          PT Assessment    Assessment Comments Patient continues to do well with PT at this time.  -AS        PT Plan    PT Plan Comments Continue with current treatment plan.  -AS               User Key  (r) = Recorded By, (t) = Taken By, (c) = Cosigned By      Initials Name Provider Type    AS Devon Morales, PT Physical Therapist                     Modalities       Row Name 07/15/25 0600             Moist Heat    MH Applied Yes  -AS      Location Right Shoulder - Sitting  -AS      PT Moist Heat Minutes 10  -AS      MH Prior to Rx Yes  -AS         Functional Testing    Outcome Measure Options Quick DASH  -AS                User Key  (r) = Recorded By, (t) = Taken By, (c) = Cosigned By      Initials Name Provider Type    AS Devon Morales, PT Physical Therapist                   OP Exercises       Row Name 07/15/25 0646 07/15/25 0600          Subjective    Subjective Comments -- Patient states his shoulder is feeling better.  -AS        Total Minutes    98424 - PT Therapeutic Exercise Minutes 35  -AS --        Exercise 1    Exercise Name 1 -- 3-Way Cane  -AS     Reps 1 -- 15  -AS     Time 1 -- 5 sec hold each  -AS        Exercise 2    Exercise Name 2 -- Supine AROM for Flexion  -AS     Reps 2 -- 15  -AS        Exercise 3    Exercise Name 3 -- Sleeper Stretch  -AS        Exercise 4    Exercise Name 4 -- IR Towel Stretch  -AS        Exercise 5    Exercise Name 5 -- Pulley's - Flex & ABD  -AS     Time 5 -- 3 min each  -AS        Exercise 6    Exercise Name 6 -- Serratus Punches  -AS     Reps 6 -- 30  -AS     Time 6 -- 2#  -AS        Exercise 7    Exercise Name 7 -- S/L ER  -AS     Reps 7 -- 30  -AS     Time 7 -- 2#  -AS        Exercise 8    Exercise Name 8 -- Prone Y/T  -AS        Exercise 9    Exercise Name 9 -- Empty/Full Can  -AS     Reps 9 --  30 each  -AS     Time 9 -- 2#  -AS        Exercise 10    Exercise Name 10 -- Rows  -AS     Reps 10 -- 25  -AS     Time 10 -- Black  -AS        Exercise 11    Exercise Name 11 -- Extensions  -AS     Reps 11 -- 25  -AS     Time 11 -- Black  -AS        Exercise 12    Exercise Name 12 -- IR  -AS     Reps 12 -- 25  -AS     Time 12 -- Black  -AS        Exercise 13    Exercise Name 13 -- ER  -AS     Reps 13 -- 25  -AS     Time 13 -- Black  -AS               User Key  (r) = Recorded By, (t) = Taken By, (c) = Cosigned By      Initials Name Provider Type    AS Devon Morales, PT Physical Therapist                                          Outcome Measure Options: Quick DASH         Time Calculation:   Start Time: 0600  Stop Time: 0646  Time Calculation (min): 46 min  Timed Charges  58733 - PT Therapeutic Exercise Minutes: 35  Untimed Charges  PT Moist Heat Minutes: 10  Total Minutes  Timed Charges Total Minutes: 35  Untimed Charges Total Minutes: 10   Total Minutes: 45  Therapy Charges for Today       Code Description Service Date Service Provider Modifiers Qty    92940514746 HC PT THER PROC EA 15 MIN 7/15/2025 Devon Morales, PT GP 2            PT G-Codes  Outcome Measure Options: Stacy Morales, PT  7/15/2025

## 2025-07-28 ENCOUNTER — OFFICE VISIT (OUTPATIENT)
Dept: ORTHOPEDIC SURGERY | Facility: CLINIC | Age: 55
End: 2025-07-28
Payer: COMMERCIAL

## 2025-07-28 VITALS — WEIGHT: 177 LBS | HEIGHT: 71 IN | BODY MASS INDEX: 24.78 KG/M2

## 2025-07-28 DIAGNOSIS — Z98.890 STATUS POST ARTHROSCOPY OF SHOULDER: Primary | ICD-10-CM

## 2025-07-28 NOTE — PROGRESS NOTES
Subjective:     Patient ID: Denny Hairston is a 55 y.o. male.    Chief Complaint:  F/u s/p right shoulder rotator cuff repair, extensive debridement and biceps tenodesis DOS 3/21/2025   History of Present Illness  Denny returns clinic today for evaluation regards to his right shoulder, doing well at this point in time.  He has had good improvement in regards to function and strength and motion.  He is very happy with his current results.  He has continued with physical therapy up to this point in time and is doing home exercise to supplement.  Denies any fevers chills or sweats.  No issues with his incisions.    Social History     Occupational History     Employer: UNKNOWN EMPLOYER    Occupation: [employer]     Comment: [employer]   Tobacco Use    Smoking status: Former     Current packs/day: 0.00     Average packs/day: 0.3 packs/day for 15.0 years (3.8 ttl pk-yrs)     Types: Cigarettes     Start date:      Quit date:      Years since quittin.5     Passive exposure: Current    Smokeless tobacco: Former   Vaping Use    Vaping status: Every Day    Substances: Nicotine, Flavoring    Devices: Pre-filled pod    Passive vaping exposure: Yes   Substance and Sexual Activity    Alcohol use: Not Currently    Drug use: Yes     Types: Marijuana     Comment: fentanyl, heroin - last use 2023    Sexual activity: Defer      Past Medical History:   Diagnosis Date    Depression     Drug abuse     GERD (gastroesophageal reflux disease)     RENE (obstructive sleep apnea)     wears cpap     Past Surgical History:   Procedure Laterality Date    COLONOSCOPY W/ POLYPECTOMY N/A 2024    Procedure: COLONOSCOPY WITH POLYPECTOMY;  Surgeon: Rainer Doty MD;  Location: [facility];  Service: Gastroenterology;  Laterality: N/A;  diverticulosis, ascending polyp x2, transverse polyp x2, descending polyp, sigmoid polyp    ENDOSCOPY N/A 2024    Procedure: ESOPHAGOGASTRODUODENOSCOPY WITH BIOPSY;  Surgeon:  "Rainer Doty MD;  Location:  [facility] OR;  Service: Gastroenterology;  Laterality: N/A;  gastritis    SHOULDER ARTHROSCOPY W/ ROTATOR CUFF REPAIR Right 3/21/2025    Procedure: shoulder arthroscopy, rotator cuff debridement versus repair, possible biceps tenodesis, subacromial decompression, and all associated procedures;  Surgeon: Rohan Shaikh MD;  Location:  [facility] OR;  Service: Orthopedics;  Laterality: Right;       Family History   Problem Relation Age of Onset    Cancer Mother     Cancer Father     Cirrhosis Father     Liver disease Sister     Cancer Brother     Malig Hyperthermia Neg Hx          Review of Systems        Objective:  Vitals:    07/28/25 1552   Weight: 80.3 kg (177 lb)   Height: 180.3 cm (71\")         07/28/25  1552   Weight: 80.3 kg (177 lb)     Body mass index is 24.69 kg/m².    Physical Exam    Vital signs reviewed.   General: No acute distress, alert and oriented  Eyes: conjunctiva clear; pupils equally round and reactive  ENT: external ears and nose atraumatic; oropharynx clear  CV: no peripheral edema  Resp: normal respiratory effort  Skin: no rashes or wounds; normal turgor  Psych: mood and affect appropriate; recent and remote memory intact       Physical Exam  Right shoulder-active forward flexion 175 degrees, 4+ out of 5 strength, external rotation 60 degrees, 4+ out of 5 strength, internal rotation T10, 5 out of 5 strength leg press test.  Negative bearhug sign.  Positive deltoid firing all 3 components.  Negative Hinds and Neer's, negative empty can test, negative drop arm test, negative external rotation lag sign.  Incisions well-healed        Imaging:  None today  Assessment:        1. s/p rotator cuff repair, biceps tenodesis, extensive debridement labrum, humeral head, subacromial bursa, DOS 3/21/2025             Assessment & Plan  Jens is doing very well at this point in time now 4 months out from surgery.  He is happy with his result.  I encouraged him to continue " working on his home exercise program at least 3 times a week to try to maximize his return of function and strength.  He was in agreement with this.  All questions answered.  Follow-up as needed    Denny Hairston was in agreement with plan and had all questions answered.     Orders:  No orders of the defined types were placed in this encounter.      Medications:  No orders of the defined types were placed in this encounter.      Followup:  No follow-ups on file.    Diagnoses and all orders for this visit:    1. s/p rotator cuff repair, biceps tenodesis, extensive debridement labrum, humeral head, subacromial bursa, DOS 3/21/2025 (Primary)        BMI is within normal parameters. No other follow-up for BMI required.          Dictated utilizing Dragon dictation     Patient or patient representative verbalized consent for the use of Ambient Listening during the visit with  Rohan Shaikh MD for chart documentation. 7/28/2025  15:58 EDT

## 2025-07-29 ENCOUNTER — APPOINTMENT (OUTPATIENT)
Dept: PHYSICAL THERAPY | Facility: HOSPITAL | Age: 55
End: 2025-07-29
Payer: COMMERCIAL

## 2025-08-21 ENCOUNTER — OFFICE VISIT (OUTPATIENT)
Dept: GASTROENTEROLOGY | Facility: CLINIC | Age: 55
End: 2025-08-21
Payer: COMMERCIAL

## 2025-08-21 ENCOUNTER — TELEPHONE (OUTPATIENT)
Dept: GASTROENTEROLOGY | Facility: CLINIC | Age: 55
End: 2025-08-21

## 2025-08-21 VITALS
DIASTOLIC BLOOD PRESSURE: 80 MMHG | SYSTOLIC BLOOD PRESSURE: 102 MMHG | HEIGHT: 71 IN | BODY MASS INDEX: 24.92 KG/M2 | WEIGHT: 178 LBS

## 2025-08-21 DIAGNOSIS — Z86.0101 PERSONAL HISTORY OF ADENOMATOUS AND SERRATED COLON POLYPS: ICD-10-CM

## 2025-08-21 DIAGNOSIS — K21.9 GASTROESOPHAGEAL REFLUX DISEASE, UNSPECIFIED WHETHER ESOPHAGITIS PRESENT: Primary | ICD-10-CM

## 2025-08-21 RX ORDER — POLYETHYLENE GLYCOL 3350, SODIUM SULFATE, SODIUM CHLORIDE, POTASSIUM CHLORIDE, ASCORBIC ACID, SODIUM ASCORBATE 140-9-5.2G
1 KIT ORAL SEE ADMIN INSTRUCTIONS
Qty: 1 EACH | Refills: 0 | Status: SHIPPED | OUTPATIENT
Start: 2025-08-21

## (undated) DEVICE — KT ORCA ORCAPOD DISP STRL

## (undated) DEVICE — SYR LL W/SCALE/MARK 3ML STRL

## (undated) DEVICE — WEREWOLF FLOW 90 COBLATION WAND: Brand: COBLATION

## (undated) DEVICE — SYR LUERLOK 50ML

## (undated) DEVICE — CANNULA THREADED FLEX 8.0 X 72MM: Brand: CLEAR-TRAC

## (undated) DEVICE — Device

## (undated) DEVICE — ADAPT DB SPIKE 2PCT FOR AR6400 TBG

## (undated) DEVICE — STRAP,POSITIONING,KNEE/BODY,FOAM,4X60": Brand: MEDLINE

## (undated) DEVICE — TOWEL,OR,DSP,ST,BLUE,STD,4/PK,20PK/CS: Brand: MEDLINE

## (undated) DEVICE — WRAP SHLDR COMPR COLD/THERP

## (undated) DEVICE — SOL ISO/ALC RUB 70PCT 4OZ

## (undated) DEVICE — ADAPT CLN BIOGUARD AIR/H2O DISP

## (undated) DEVICE — SYR LUERLOK 20CC BX/50

## (undated) DEVICE — DRAPE,TOWEL,LARGE,INVISISHIELD: Brand: MEDLINE

## (undated) DEVICE — INTENDED TO SUPPORT AND MAINTAIN THE POSITION OF AN ANESTHETIZED PATIENT DURING SURGERY: Brand: MARCO SHOULDER STABILIZATION KIT

## (undated) DEVICE — GLV SURG SENSICARE PI PF LF 7 GRN STRL

## (undated) DEVICE — TRANSFER SET 3": Brand: MEDLINE INDUSTRIES, INC.

## (undated) DEVICE — INTENT TO BE USED WITH SUTURE MATERIAL FOR TISSUE CLOSURE: Brand: RICHARD-ALLAN® NEEDLE 1/2 CIRCLE TAPER

## (undated) DEVICE — FIRSTPASS ST SUTURE PASSER, SELF CAPTURE: Brand: FIRSTPASS

## (undated) DEVICE — ACCU-PASS SUTURE SHUTTLE 45                                    DEGREE, UPBEND, STERILE: Brand: ACCU-PASS

## (undated) DEVICE — DECANTER BAG 9": Brand: MEDLINE INDUSTRIES, INC.

## (undated) DEVICE — VIAL FORMALIN CAP 10P 40ML

## (undated) DEVICE — 3M™ MEDIPORE™ H SOFT CLOTH SURGICAL TAPE 2864, 4 INCH X 10 YARD (10CM X 9,14M), 12 ROLLS/CASE: Brand: 3M™ MEDIPORE™

## (undated) DEVICE — SOL IRR H2O BTL 1000ML STRL

## (undated) DEVICE — LINER SURG CANSTR SXN S/RIGD 1500CC

## (undated) DEVICE — GLV SURG SENSICARE PI LF PF 7.0

## (undated) DEVICE — DRP SURG U/DRP INVISISHIELD PA 48X52IN

## (undated) DEVICE — BW-412T DISP COMBO CLEANING BRUSH: Brand: SINGLE USE COMBINATION CLEANING BRUSH

## (undated) DEVICE — SUT PDS 0 CT1 36IN Z346H

## (undated) DEVICE — T-MAX DISPOSABLE FACE MASK 8 PER BOX

## (undated) DEVICE — TRAP FLD MINIVAC MEGADYNE 100ML

## (undated) DEVICE — 4.5 FULL RADIUS BONECUTTER BLADES,                                    YELLOW, 8000 MAXIMUM RPM, PACKAGED 6                                    PER BOX, STERILE

## (undated) DEVICE — SPNG GZ WOVN 4X4IN 12PLY 10/BX STRL

## (undated) DEVICE — SOL IRR H2O BO 1000ML STRL

## (undated) DEVICE — THE SINGLE USE ETRAP – POLYP TRAP IS USED FOR SUCTION RETRIEVAL OF ENDOSCOPICALLY REMOVED POLYPS.: Brand: ETRAP

## (undated) DEVICE — SUT ETHLN 3/0 PS2 18 IN 1669H

## (undated) DEVICE — PAD,NON-ADHERENT,3X8,STERILE,LF,1/PK: Brand: MEDLINE

## (undated) DEVICE — HYPODERMIC SAFETY NEEDLE: Brand: MAGELLAN

## (undated) DEVICE — PENCL SMOKE/EVAC MEGADYNE TELESCP 10FT

## (undated) DEVICE — GLV SURG SENSICARE PF POLYISPRN SZ8 LF

## (undated) DEVICE — DRESSING,GAUZE,XEROFORM,CURAD,1"X8",ST: Brand: CURAD

## (undated) DEVICE — STRIP,CLOSURE,WOUND,MEDI-STRIP,1/2X4: Brand: MEDLINE

## (undated) DEVICE — CANNULA THREADED FLEX 5.5 X 72MM: Brand: CLEAR-TRAC

## (undated) DEVICE — DRSNG SURESITE WNDW 4X4.5

## (undated) DEVICE — SNAR POLYP CAPTIVATOR/COLD STFF RND 10MM 240CM

## (undated) DEVICE — THE BITE BLOCK MAXI, LATEX FREE STRAP IS USED TO PROTECT THE ENDOSCOPE INSERTION TUBE FROM BEING BITTEN BY THE PATIENT.

## (undated) DEVICE — ST TB GOFLO STRL

## (undated) DEVICE — PK SHLDR ARTHSCP 90

## (undated) DEVICE — FRCP BX RADJAW4 NDL 2.8 240CM LG OG BX40